# Patient Record
Sex: FEMALE | Race: WHITE | NOT HISPANIC OR LATINO | Employment: OTHER | URBAN - NONMETROPOLITAN AREA
[De-identification: names, ages, dates, MRNs, and addresses within clinical notes are randomized per-mention and may not be internally consistent; named-entity substitution may affect disease eponyms.]

---

## 2018-08-23 ENCOUNTER — TRANSCRIBE ORDERS (OUTPATIENT)
Dept: ADMINISTRATIVE | Facility: HOSPITAL | Age: 63
End: 2018-08-23

## 2018-08-23 DIAGNOSIS — I48.0 PAROXYSMAL ATRIAL FIBRILLATION (HCC): Primary | ICD-10-CM

## 2018-08-29 ENCOUNTER — HOSPITAL ENCOUNTER (OUTPATIENT)
Dept: CARDIOLOGY | Facility: HOSPITAL | Age: 63
Discharge: HOME | End: 2018-08-29
Admitting: NURSE PRACTITIONER

## 2018-08-29 DIAGNOSIS — I48.0 PAROXYSMAL ATRIAL FIBRILLATION (HCC): ICD-10-CM

## 2018-08-29 LAB
BH CV ECHO MEAS - IVSD: 1.5 CM
BH CV ECHO MEAS - LVPWD: 1.5 CM
LEFT ATRIUM VOLUME INDEX: 34 ML/M2
LV EF 2D ECHO EST: 66 %
MAXIMAL PREDICTED HEART RATE: 155 BPM
STRESS TARGET HR: 132 BPM

## 2018-08-29 PROCEDURE — 93306 TTE W/DOPPLER COMPLETE: CPT

## 2018-08-29 PROCEDURE — 93306 TTE W/DOPPLER COMPLETE: CPT | Performed by: INTERNAL MEDICINE

## 2018-08-31 ENCOUNTER — APPOINTMENT (OUTPATIENT)
Dept: CARDIOLOGY | Facility: HOSPITAL | Age: 63
End: 2018-08-31

## 2019-02-12 ENCOUNTER — CONSULT (OUTPATIENT)
Dept: CARDIOLOGY | Facility: CLINIC | Age: 64
End: 2019-02-12

## 2019-02-12 VITALS
BODY MASS INDEX: 33.19 KG/M2 | HEIGHT: 68 IN | HEART RATE: 62 BPM | DIASTOLIC BLOOD PRESSURE: 90 MMHG | SYSTOLIC BLOOD PRESSURE: 142 MMHG | WEIGHT: 219 LBS

## 2019-02-12 DIAGNOSIS — Z79.899 LONG TERM CURRENT USE OF ANTIARRHYTHMIC MEDICAL THERAPY: ICD-10-CM

## 2019-02-12 DIAGNOSIS — I48.19 PERSISTENT ATRIAL FIBRILLATION (HCC): Primary | ICD-10-CM

## 2019-02-12 DIAGNOSIS — I10 ESSENTIAL HYPERTENSION: ICD-10-CM

## 2019-02-12 PROCEDURE — 99204 OFFICE O/P NEW MOD 45 MIN: CPT | Performed by: INTERNAL MEDICINE

## 2019-02-12 PROCEDURE — 93000 ELECTROCARDIOGRAM COMPLETE: CPT | Performed by: INTERNAL MEDICINE

## 2019-02-12 RX ORDER — ALBUTEROL SULFATE 90 UG/1
2 AEROSOL, METERED RESPIRATORY (INHALATION) EVERY 4 HOURS PRN
COMMUNITY
End: 2019-03-10

## 2019-02-12 RX ORDER — BENAZEPRIL/HYDROCHLOROTHIAZIDE 20 MG-25MG
1 TABLET ORAL DAILY
COMMUNITY
End: 2019-06-06

## 2019-02-12 RX ORDER — AMLODIPINE BESYLATE 5 MG/1
10 TABLET ORAL DAILY
COMMUNITY
End: 2019-06-06 | Stop reason: ALTCHOICE

## 2019-02-12 NOTE — PROGRESS NOTES
Stefani Severino  1955  PCP: System, Provider Not In    SUBJECTIVE:   Stefani Severino is a 63 y.o. female seen for a consultation visit regarding the following:     Chief Complaint:   Chief Complaint   Patient presents with   • Atrial Fibrillation     Consult           Consultation is requested by No Known Provider for evaluation of Atrial Fibrillation (Consult )        History:  This is a 63-year-old patient with a history of atrial fibrillation treated in [facility] for many years.  She was first diagnosed with atrial fibrillation in 2009.  She underwent a cardioversion at that time.  She had a follow-up cardioversion and 2014.  She was started on Multaq in 2014 has been doing well ever since.  She is very symptomatically from her atrial fibrillation.  During the A. fib events she had experiences tachycardia palpitations along with shortness of breath.      Cardiac PMH: (Old records have been reviewed and summarized below)  1.  Atrial fibrillation-diagnosed May 2009  2.  Cardioversions-May 2009, October 2014  3.  Start on Multaq therapy in October 2014  4.  Nuclear stress test-2007-negative for ischemia  5.  Hypertension  6.  Sleep apnea-on CPAP    Past Medical History, Past Surgical History, Family history, Social History, and Medications were all reviewed with the patient today and updated as necessary.       Current Outpatient Medications:   •  albuterol sulfate  (90 Base) MCG/ACT inhaler, Inhale 2 puffs Every 4 (Four) Hours As Needed for Wheezing., Disp: , Rfl:   •  amLODIPine (NORVASC) 5 MG tablet, Take 5 mg by mouth 2 (Two) Times a Day., Disp: , Rfl:   •  aspirin 81 MG chewable tablet, Chew 81 mg Daily., Disp: , Rfl:   •  atorvastatin (LIPITOR) 20 MG tablet, Take 20 mg by mouth Daily., Disp: , Rfl:   •  azelastine (ASTELIN) 0.1 % nasal spray, 2 sprays into each nostril Daily. Use in each nostril as directed, Disp: , Rfl:   •  benazepril-hydrochlorthiazide (LOTENSIN HCT) 20-25  MG per tablet, Take 1 tablet by mouth Daily., Disp: , Rfl:   •  cetirizine (zyrTEC) 5 MG tablet, Take 5 mg by mouth Daily., Disp: , Rfl:   •  dronedarone (MULTAQ) 400 MG tablet, Take 1 tablet by mouth Daily., Disp: 60 tablet, Rfl: 11  •  escitalopram (LEXAPRO) 20 MG tablet, Take 20 mg by mouth Daily., Disp: , Rfl:   •  fluticasone-salmeterol (ADVAIR) 500-50 MCG/DOSE DISKUS, Inhale 2 (Two) Times a Day., Disp: , Rfl:     Allergies   Allergen Reactions   • Dextroamphetamine Sulfate Hallucinations   • Penicillins Unknown (See Comments)     ALLERGY AS A CHILD AND DOESN'T RECALL REACTION         Past Medical History:   Diagnosis Date   • A-fib (CMS/HCC)    • Anxiety    • Hypercholesterolemia    • Hypertension    • Sleep apnea      Past Surgical History:   Procedure Laterality Date   • APPENDECTOMY     • HYSTERECTOMY     • INNER EAR SURGERY     • TONSILLECTOMY       Family History   Problem Relation Age of Onset   • Dementia Mother    • Hydrocephalus Mother    • Ovarian cancer Mother    • Thyroid disease Mother    • Heart valve disorder Father    • Diabetes Father    • Thyroid cancer Brother    • ALS Paternal Grandmother    • Hypertension Brother    • Hypertension Brother      Social History     Tobacco Use   • Smoking status: Former Smoker     Types: Cigarettes     Last attempt to quit: 1974     Years since quittin.0   • Smokeless tobacco: Never Used   Substance Use Topics   • Alcohol use: Yes     Alcohol/week: 1.2 oz     Types: 2 Glasses of wine per week     Comment: OCCASIONALLY       ROS:    General: no recent weight loss/gain, weakness or fatigue  Skin: no rashes, lumps, or other skin changes  HEENT: no dizziness, lightheadedness, or vision changes  Respiratory: no cough or hemoptysis  Cardiovascular: No palpitations, or tachycardia  Gastrointestinal: no black/tarry stools or diarrhea  Urinary: no change in frequency or urgency  Peripheral Vascular: no claudication or leg cramps  Musculoskeletal: no muscle  "or joint pain/stiffness  Psychiatric: no depression or excessive stress  Neurological: no sensory or motor loss, no syncope  Hematologic: no anemia, easy bruising or bleeding  Endocrine: no thyroid problems, nor heat or cold intolerance       PHYSICAL EXAM:   /90 (BP Location: Left arm, Patient Position: Sitting)   Pulse 62   Ht 172.7 cm (68\")   Wt 99.3 kg (219 lb)   BMI 33.30 kg/m²      Wt Readings from Last 5 Encounters:   02/12/19 99.3 kg (219 lb)   06/13/18 94.5 kg (208 lb 4 oz)     BP Readings from Last 5 Encounters:   02/12/19 142/90   06/13/18 140/78       General-Well Nourished, Well developed  Eyes - PERRLA  Neck- supple, No mass  CV- regular rate and rhythm, no MRG  Lung- clear bilaterally  Abd- soft, +BS  Musc/skel - Norm strength and range of motion  Skin- warm and dry  Neuro - Alert & Oriented x 3, appropriate mood.    Medical problems and test results were reviewed with the patient today.     Results for orders placed or performed during the hospital encounter of 08/29/18   Adult Transthoracic Echo Complete W/ Cont if Necessary Per Protocol   Result Value Ref Range    Target HR (85%) 132 bpm    Max. Pred. HR (100%) 155 bpm    LA Volume Index 34.0 mL/m2    Echo EF Estimated 66 %    LVPWd 1.5 cm    IVSd 1.5 cm         No results found for: CHOL, HDL, HDLC, LDL, LDLC, VLDL    EKG:  (EKG/Tracing has been independently visualized by me and summarized below)      ECG 12 Lead  Date/Time: 2/12/2019 1:01 PM  Performed by: Reddy Nino MD  Authorized by: Reddy Nino MD   Rhythm: sinus rhythm  Rate: normal  QRS axis: normal    Clinical impression: normal ECG            ASSESSMENT and PLAN  1.  Atrial fibrillation-persistent in nature when it occurs.  Has required 2 cardioversions in the past.  Currently is doing well on Multaq therapy.   2.  Use of Multaq - monitor labs  3.  HTN - At goal    Return in about 6 months (around 8/12/2019).            Reddy Nino M.D., F.A.C.C, " [facility]  Cardiology/Electrophysiology  02/12/19  1:02 PM

## 2019-04-19 ENCOUNTER — LAB (OUTPATIENT)
Dept: LAB | Facility: HOSPITAL | Age: 64
End: 2019-04-19

## 2019-04-19 ENCOUNTER — OFFICE VISIT (OUTPATIENT)
Dept: FAMILY MEDICINE CLINIC | Facility: CLINIC | Age: 64
End: 2019-04-19

## 2019-04-19 VITALS
BODY MASS INDEX: 32.89 KG/M2 | WEIGHT: 217 LBS | HEIGHT: 68 IN | OXYGEN SATURATION: 96 % | HEART RATE: 68 BPM | SYSTOLIC BLOOD PRESSURE: 118 MMHG | DIASTOLIC BLOOD PRESSURE: 66 MMHG

## 2019-04-19 DIAGNOSIS — R06.02 SHORT OF BREATH ON EXERTION: ICD-10-CM

## 2019-04-19 DIAGNOSIS — R60.9 PERIPHERAL EDEMA: Primary | ICD-10-CM

## 2019-04-19 DIAGNOSIS — R60.9 PERIPHERAL EDEMA: ICD-10-CM

## 2019-04-19 LAB
ALBUMIN SERPL-MCNC: 4.1 G/DL (ref 3.5–5.2)
ALBUMIN/GLOB SERPL: 1.9 G/DL
ALP SERPL-CCNC: 42 U/L (ref 39–117)
ALT SERPL W P-5'-P-CCNC: 22 U/L (ref 1–33)
ANION GAP SERPL CALCULATED.3IONS-SCNC: 14.1 MMOL/L
AST SERPL-CCNC: 21 U/L (ref 1–32)
BILIRUB SERPL-MCNC: 0.3 MG/DL (ref 0.2–1.2)
BUN BLD-MCNC: 24 MG/DL (ref 8–23)
BUN/CREAT SERPL: 18.5 (ref 7–25)
CALCIUM SPEC-SCNC: 9.2 MG/DL (ref 8.6–10.5)
CHLORIDE SERPL-SCNC: 99 MMOL/L (ref 98–107)
CO2 SERPL-SCNC: 28.9 MMOL/L (ref 22–29)
CREAT BLD-MCNC: 1.3 MG/DL (ref 0.57–1)
GFR SERPL CREATININE-BSD FRML MDRD: 41 ML/MIN/1.73
GLOBULIN UR ELPH-MCNC: 2.2 GM/DL
GLUCOSE BLD-MCNC: 87 MG/DL (ref 65–99)
POTASSIUM BLD-SCNC: 3.8 MMOL/L (ref 3.5–5.2)
PROT SERPL-MCNC: 6.3 G/DL (ref 6–8.5)
SODIUM BLD-SCNC: 142 MMOL/L (ref 136–145)

## 2019-04-19 PROCEDURE — 36415 COLL VENOUS BLD VENIPUNCTURE: CPT

## 2019-04-19 PROCEDURE — 99204 OFFICE O/P NEW MOD 45 MIN: CPT | Performed by: FAMILY MEDICINE

## 2019-04-19 PROCEDURE — 83880 ASSAY OF NATRIURETIC PEPTIDE: CPT | Performed by: FAMILY MEDICINE

## 2019-04-19 PROCEDURE — 80053 COMPREHEN METABOLIC PANEL: CPT | Performed by: FAMILY MEDICINE

## 2019-04-19 RX ORDER — FUROSEMIDE 20 MG/1
20 TABLET ORAL AS NEEDED
COMMUNITY
End: 2019-04-19 | Stop reason: SDUPTHER

## 2019-04-19 RX ORDER — FUROSEMIDE 40 MG/1
40 TABLET ORAL DAILY
Qty: 30 TABLET | Refills: 0 | Status: SHIPPED | OUTPATIENT
Start: 2019-04-19 | End: 2019-04-19 | Stop reason: SDUPTHER

## 2019-04-19 RX ORDER — FUROSEMIDE 40 MG/1
40 TABLET ORAL DAILY
Qty: 90 TABLET | Refills: 0 | Status: SHIPPED | OUTPATIENT
Start: 2019-04-19 | End: 2019-06-06 | Stop reason: ALTCHOICE

## 2019-04-19 NOTE — PROGRESS NOTES
Stefani Severino presents today to establish care.    Chief Complaint   Patient presents with   • Foot Swelling     bilateral, left more than right, started 2 weeks ago and is getting worse        HPI     Edema:  Onset 2 weeks ago. Ankle down. Worse later in the day. No known aggravating factors. Feels tight. Sometimes legs get red and itching with swelling, but no bumps. Last PCP sent lasix #5 pills. Took lasix pill around 8am without improvement. Tried to reduce salt in diet. No prior h/o swelling. Feels tired. Sinus infection in March and hasn't gotten energy back. Notices going up 2-3 flights of steps more short of breath. No orthopnea.  Patient reports that she was in a tanning bed yesterday.        Review of Systems   Constitutional: Positive for fatigue.   HENT: Positive for hearing loss.    Eyes: Negative.    Respiratory: Positive for shortness of breath.    Cardiovascular: Positive for leg swelling. Negative for chest pain and palpitations.        Leg cramps   Gastrointestinal: Negative.    Endocrine: Negative.    Genitourinary: Negative.    Musculoskeletal: Negative.    Skin: Positive for rash.   Neurological: Positive for weakness.   Hematological: Negative.    Psychiatric/Behavioral: Negative.       PHQ-2/PHQ-9 Depression Screening 4/19/2019   Little interest or pleasure in doing things 0   Feeling down, depressed, or hopeless 0   Total Score 0       Past Medical History:   Diagnosis Date   • A-fib (CMS/HCC)    • Anxiety    • Diastolic dysfunction     grade 1 with impaired relaxation   • Hearing loss    • Hypercholesterolemia    • Hypertension    • Left atrial dilatation    • LVH (left ventricular hypertrophy)    • Sleep apnea    • Tinnitus         Past Surgical History:   Procedure Laterality Date   • APPENDECTOMY  1969   • HYSTERECTOMY  1984   • INNER EAR SURGERY Left 1997, 2005   • TONSILLECTOMY  1959        Family History   Problem Relation Age of Onset   • Dementia Mother    • Hydrocephalus  Mother    • Ovarian cancer Mother 47   • Arthritis Mother    • Osteoporosis Mother    • Hyperthyroidism Mother    • Heart valve disorder Father    • Diabetes Father    • Thyroid cancer Brother    • ALS Paternal Grandmother    • Hypertension Brother    • Hypertension Brother         Social History     Socioeconomic History   • Marital status: Single     Spouse name: Not on file   • Number of children: Not on file   • Years of education: Not on file   • Highest education level: Not on file   Tobacco Use   • Smoking status: Former Smoker     Types: Cigarettes     Last attempt to quit: 1974     Years since quittin.2   • Smokeless tobacco: Never Used   Substance and Sexual Activity   • Alcohol use: Yes     Alcohol/week: 1.2 oz     Types: 2 Glasses of wine per week     Comment: OCCASIONALLY   • Drug use: Defer   • Sexual activity: Defer        Current Outpatient Medications   Medication Sig Dispense Refill   • amLODIPine (NORVASC) 5 MG tablet Take 10 mg by mouth Daily.     • aspirin 81 MG chewable tablet Chew 81 mg Daily.     • atorvastatin (LIPITOR) 20 MG tablet Take 20 mg by mouth Daily.     • azelastine (ASTELIN) 0.1 % nasal spray 2 sprays into each nostril Daily. Use in each nostril as directed     • benazepril-hydrochlorthiazide (LOTENSIN HCT) 20-25 MG per tablet Take 1 tablet by mouth Daily.     • cetirizine (zyrTEC) 5 MG tablet Take 10 mg by mouth Daily.     • dronedarone (MULTAQ) 400 MG tablet Take 1 tablet by mouth 2 (Two) Times a Day With Meals. 180 tablet 3   • escitalopram (LEXAPRO) 20 MG tablet Take 20 mg by mouth Daily.     • fluticasone (VERAMYST) 27.5 MCG/SPRAY nasal spray 2 sprays into the nostril(s) as directed by provider Daily.     • furosemide (LASIX) 20 MG tablet Take 20 mg by mouth As Needed.       No current facility-administered medications for this visit.          Allergies   Allergen Reactions   • Dextroamphetamine Sulfate Hallucinations   • Penicillins Unknown (See Comments)      "ALLERGY AS A CHILD AND DOESN'T RECALL REACTION        Visit Vitals  /66 (BP Location: Left arm, Patient Position: Sitting, Cuff Size: Adult)   Pulse 68   Ht 172.7 cm (68\")   Wt 98.4 kg (217 lb)   SpO2 96%   BMI 32.99 kg/m²        Physical Exam   Constitutional: She is oriented to person, place, and time. No distress. She is obese.  HENT:   Nose: Nose normal.   Mouth/Throat: Oropharynx is clear and moist.   Eyes: Conjunctivae are normal. Pupils are equal, round, and reactive to light.   Neck: Neck supple. No thyromegaly present.   Cardiovascular: Normal rate, regular rhythm and intact distal pulses.   No murmur heard.  Pulses:       Posterior tibial pulses are 2+ on the right side, and 2+ on the left side.   Pulmonary/Chest: Effort normal and breath sounds normal.   Abdominal: Soft. There is no hepatosplenomegaly. There is no tenderness.   Musculoskeletal: She exhibits edema ( 1+ pitting edema to above the knee bilateral).   Lymphadenopathy:     She has no cervical adenopathy.   Neurological: She is alert and oriented to person, place, and time.   Skin: Skin is warm and dry. Rash ( 1.  Diffuse erythema to the abdomen.  2.  Bilateral lower extremities faint pinpoint erythema without papules or warmth) noted.   Psychiatric: She has a normal mood and affect.   Vitals reviewed.       Results for orders placed or performed during the hospital encounter of 18   Adult Transthoracic Echo Complete W/ Cont if Necessary Per Protocol   Result Value Ref Range    Target HR (85%) 132 bpm    Max. Pred. HR (100%) 155 bpm    LA Volume Index 34.0 mL/m2    Echo EF Estimated 66 %    LVPWd 1.5 cm    IVSd 1.5 cm      Study date: 18   Patient Information     Patient Name  Stefani Severino MRN  [id number] Sex  Female  (Age)  1955 (64 y.o.)   Sedation Narrator Report     Sedation Narrator Report   Interpretation Summary     · Left ventricular wall thickness is consistent with concentric hypertrophy.  · Left " ventricular systolic function is normal. Estimated EF = 66%.  · Left ventricular diastolic dysfunction (grade I) consistent with impaired relaxation.  · Left atrial cavity size is mildly dilated.         Stefani was seen today for foot swelling.    Diagnoses and all orders for this visit:  Prior PCP records requested.  Peripheral edema  -     Comprehensive Metabolic Panel; Future  -     furosemide (LASIX) 40 MG tablet; Take 1 tablet by mouth Daily.  New onset. Further evaluation with labs today. She has been taking Lasix 20 without benefit.  Increase Lasix to 40 mg once a day.  Reviewed echo results from August as above.  Short of breath on exertion  -     BNP; Future  -     Comprehensive Metabolic Panel; Future  New.  Further evaluation with labs today.  Normal lung exam.      Return if symptoms worsen or fail to improve.  Depending upon results of labs as above.

## 2019-04-20 ENCOUNTER — TELEPHONE (OUTPATIENT)
Dept: FAMILY MEDICINE CLINIC | Facility: CLINIC | Age: 64
End: 2019-04-20

## 2019-04-20 ENCOUNTER — HOSPITAL ENCOUNTER (EMERGENCY)
Facility: HOSPITAL | Age: 64
Discharge: HOME | End: 2019-04-20
Attending: EMERGENCY MEDICINE | Admitting: EMERGENCY MEDICINE

## 2019-04-20 ENCOUNTER — APPOINTMENT (OUTPATIENT)
Dept: GENERAL RADIOLOGY | Facility: HOSPITAL | Age: 64
End: 2019-04-20

## 2019-04-20 VITALS
TEMPERATURE: 97.7 F | RESPIRATION RATE: 16 BRPM | WEIGHT: 217 LBS | OXYGEN SATURATION: 97 % | SYSTOLIC BLOOD PRESSURE: 120 MMHG | HEART RATE: 60 BPM | BODY MASS INDEX: 32.89 KG/M2 | DIASTOLIC BLOOD PRESSURE: 60 MMHG | HEIGHT: 68 IN

## 2019-04-20 DIAGNOSIS — R60.9 DEPENDENT EDEMA: Primary | ICD-10-CM

## 2019-04-20 LAB
ALBUMIN SERPL-MCNC: 4 G/DL (ref 3.5–5)
ALBUMIN/GLOB SERPL: 1.6 G/DL (ref 1–2)
ALP SERPL-CCNC: 58 U/L (ref 38–126)
ALT SERPL W P-5'-P-CCNC: 31 U/L (ref 13–69)
ANION GAP SERPL CALCULATED.3IONS-SCNC: 12.3 MMOL/L (ref 10–20)
AST SERPL-CCNC: 25 U/L (ref 15–46)
BASOPHILS # BLD AUTO: 0.07 10*3/MM3 (ref 0–0.2)
BASOPHILS NFR BLD AUTO: 1.2 % (ref 0–1.5)
BILIRUB SERPL-MCNC: 0.3 MG/DL (ref 0.2–1.3)
BNP SERPL-MCNC: 14.2 PG/ML (ref 0–100)
BUN BLD-MCNC: 24 MG/DL (ref 7–20)
BUN/CREAT SERPL: 18.5 (ref 7.1–23.5)
CALCIUM SPEC-SCNC: 8.9 MG/DL (ref 8.4–10.2)
CHLORIDE SERPL-SCNC: 98 MMOL/L (ref 98–107)
CO2 SERPL-SCNC: 31 MMOL/L (ref 26–30)
CREAT BLD-MCNC: 1.3 MG/DL (ref 0.6–1.3)
DEPRECATED RDW RBC AUTO: 47.5 FL (ref 37–54)
EOSINOPHIL # BLD AUTO: 0.12 10*3/MM3 (ref 0–0.4)
EOSINOPHIL NFR BLD AUTO: 2 % (ref 0.3–6.2)
ERYTHROCYTE [DISTWIDTH] IN BLOOD BY AUTOMATED COUNT: 13.1 % (ref 12.3–15.4)
GFR SERPL CREATININE-BSD FRML MDRD: 41 ML/MIN/1.73
GLOBULIN UR ELPH-MCNC: 2.5 GM/DL
GLUCOSE BLD-MCNC: 101 MG/DL (ref 74–98)
HCT VFR BLD AUTO: 36.8 % (ref 34–46.6)
HGB BLD-MCNC: 12.7 G/DL (ref 12–15.9)
IMM GRANULOCYTES # BLD AUTO: 0.01 10*3/MM3 (ref 0–0.05)
IMM GRANULOCYTES NFR BLD AUTO: 0.2 % (ref 0–0.5)
LYMPHOCYTES # BLD AUTO: 1.52 10*3/MM3 (ref 0.7–3.1)
LYMPHOCYTES NFR BLD AUTO: 25.8 % (ref 19.6–45.3)
MCH RBC QN AUTO: 34 PG (ref 26.6–33)
MCHC RBC AUTO-ENTMCNC: 34.5 G/DL (ref 31.5–35.7)
MCV RBC AUTO: 98.4 FL (ref 79–97)
MONOCYTES # BLD AUTO: 0.73 10*3/MM3 (ref 0.1–0.9)
MONOCYTES NFR BLD AUTO: 12.4 % (ref 5–12)
NEUTROPHILS # BLD AUTO: 3.45 10*3/MM3 (ref 1.7–7)
NEUTROPHILS NFR BLD AUTO: 58.4 % (ref 42.7–76)
NRBC BLD AUTO-RTO: 0 /100 WBC (ref 0–0.2)
NT-PROBNP SERPL-MCNC: 38.8 PG/ML (ref 0–125)
PLATELET # BLD AUTO: 293 10*3/MM3 (ref 140–450)
PMV BLD AUTO: 9.5 FL (ref 6–12)
POTASSIUM BLD-SCNC: 3.3 MMOL/L (ref 3.5–5.1)
PROT SERPL-MCNC: 6.5 G/DL (ref 6.3–8.2)
RBC # BLD AUTO: 3.74 10*6/MM3 (ref 3.77–5.28)
SODIUM BLD-SCNC: 138 MMOL/L (ref 137–145)
TROPONIN I SERPL-MCNC: <0.012 NG/ML (ref 0–0.03)
WBC NRBC COR # BLD: 5.9 10*3/MM3 (ref 3.4–10.8)

## 2019-04-20 PROCEDURE — 83880 ASSAY OF NATRIURETIC PEPTIDE: CPT | Performed by: EMERGENCY MEDICINE

## 2019-04-20 PROCEDURE — 85025 COMPLETE CBC W/AUTO DIFF WBC: CPT | Performed by: EMERGENCY MEDICINE

## 2019-04-20 PROCEDURE — 84484 ASSAY OF TROPONIN QUANT: CPT | Performed by: EMERGENCY MEDICINE

## 2019-04-20 PROCEDURE — 99283 EMERGENCY DEPT VISIT LOW MDM: CPT

## 2019-04-20 PROCEDURE — 93005 ELECTROCARDIOGRAM TRACING: CPT | Performed by: EMERGENCY MEDICINE

## 2019-04-20 PROCEDURE — 71045 X-RAY EXAM CHEST 1 VIEW: CPT

## 2019-04-20 PROCEDURE — 80053 COMPREHEN METABOLIC PANEL: CPT | Performed by: EMERGENCY MEDICINE

## 2019-04-20 NOTE — TELEPHONE ENCOUNTER
Patient was called regarding her labs which showed evidently new renal impairment with a creatinine of 1.3. BNP had not returned as of the call. She presented to my colleague yesterday as a new patient with symptoms consistent with new-onset heart failure including pedal edema and FERNANDEZ of 2 weeks duration. Of note she has a history of persistent atrial fibrillation and left atrial dilation per chart. She reports no history of heart failure and no current palpitations, and states that her pedal edema has improved this morning after 40mg of PO Lasix.    Given the new renal impairment (likely secondary to third spacing from HF exacerbation) and dramatic change in symptoms 2 weeks ago and presumed initial heart failure exacerbation (EF preserved in on echo 8/2018), I asked her to attend to the [facility]. Report was called at 10:05 AM to alert them to her arrival, and I appreciate the assistance in her care.

## 2019-04-22 DIAGNOSIS — R79.89 ELEVATED SERUM CREATININE: Primary | ICD-10-CM

## 2019-05-23 ENCOUNTER — TELEPHONE (OUTPATIENT)
Dept: FAMILY MEDICINE CLINIC | Facility: CLINIC | Age: 64
End: 2019-05-23

## 2019-05-23 NOTE — TELEPHONE ENCOUNTER
Please call patient to remind her that she needs to have labs drawn to follow-up on her kidney function.  She does not need appointment and she can go to the lab at any time.

## 2019-05-31 ENCOUNTER — TELEPHONE (OUTPATIENT)
Dept: CARDIOLOGY | Facility: CLINIC | Age: 64
End: 2019-05-31

## 2019-06-06 ENCOUNTER — OFFICE VISIT (OUTPATIENT)
Dept: CARDIOLOGY | Facility: CLINIC | Age: 64
End: 2019-06-06

## 2019-06-06 ENCOUNTER — LAB (OUTPATIENT)
Dept: LAB | Facility: HOSPITAL | Age: 64
End: 2019-06-06

## 2019-06-06 VITALS
HEIGHT: 68 IN | WEIGHT: 217.8 LBS | HEART RATE: 60 BPM | DIASTOLIC BLOOD PRESSURE: 50 MMHG | SYSTOLIC BLOOD PRESSURE: 122 MMHG | BODY MASS INDEX: 33.01 KG/M2 | OXYGEN SATURATION: 95 %

## 2019-06-06 DIAGNOSIS — I10 ESSENTIAL HYPERTENSION: ICD-10-CM

## 2019-06-06 DIAGNOSIS — R60.0 BILATERAL LEG EDEMA: ICD-10-CM

## 2019-06-06 DIAGNOSIS — I48.19 PERSISTENT ATRIAL FIBRILLATION (HCC): Primary | ICD-10-CM

## 2019-06-06 DIAGNOSIS — Z79.899 LONG TERM CURRENT USE OF ANTIARRHYTHMIC MEDICAL THERAPY: ICD-10-CM

## 2019-06-06 DIAGNOSIS — I48.19 PERSISTENT ATRIAL FIBRILLATION (HCC): ICD-10-CM

## 2019-06-06 LAB
ANION GAP SERPL CALCULATED.3IONS-SCNC: 13.2 MMOL/L
BUN BLD-MCNC: 21 MG/DL (ref 8–23)
BUN/CREAT SERPL: 19.1 (ref 7–25)
CALCIUM SPEC-SCNC: 9.2 MG/DL (ref 8.6–10.5)
CHLORIDE SERPL-SCNC: 101 MMOL/L (ref 98–107)
CO2 SERPL-SCNC: 27.8 MMOL/L (ref 22–29)
CREAT BLD-MCNC: 1.1 MG/DL (ref 0.57–1)
GFR SERPL CREATININE-BSD FRML MDRD: 50 ML/MIN/1.73
GLUCOSE BLD-MCNC: 98 MG/DL (ref 65–99)
POTASSIUM BLD-SCNC: 3.8 MMOL/L (ref 3.5–5.2)
SODIUM BLD-SCNC: 142 MMOL/L (ref 136–145)

## 2019-06-06 PROCEDURE — 93000 ELECTROCARDIOGRAM COMPLETE: CPT | Performed by: PHYSICIAN ASSISTANT

## 2019-06-06 PROCEDURE — 99214 OFFICE O/P EST MOD 30 MIN: CPT | Performed by: PHYSICIAN ASSISTANT

## 2019-06-06 PROCEDURE — 36415 COLL VENOUS BLD VENIPUNCTURE: CPT

## 2019-06-06 PROCEDURE — 80048 BASIC METABOLIC PNL TOTAL CA: CPT

## 2019-06-06 RX ORDER — BENAZEPRIL HYDROCHLORIDE AND HYDROCHLOROTHIAZIDE 20; 12.5 MG/1; MG/1
1 TABLET ORAL DAILY
COMMUNITY
End: 2019-06-06 | Stop reason: ALTCHOICE

## 2019-06-06 RX ORDER — FUROSEMIDE 20 MG/1
20 TABLET ORAL DAILY PRN
COMMUNITY
End: 2019-06-06 | Stop reason: ALTCHOICE

## 2019-06-06 RX ORDER — FUROSEMIDE 20 MG/1
20 TABLET ORAL AS NEEDED
Qty: 90 TABLET | Refills: 3 | Status: SHIPPED | OUTPATIENT
Start: 2019-06-06 | End: 2019-10-21

## 2019-06-06 NOTE — PROGRESS NOTES
"[facility]   OFFICE NOTE      Stefani Severino  1955  PCP: Provider, No Known    SUBJECTIVE:   Stefani Severino is a 64 y.o. female seen for a follow up visit regarding the following:     CC: Edema    HPI:   Pleasant 64-year-old female presents today for a new problem of edema.  She states been going on for couple months.  She also follows Dr. Nino for history of atrial fibrillation and hypertension.  She states 2 months ago she began having worsening lower extremity edema.  She felt this was occurring more so when she was riding in the car for long periods time or sitting.  She presented her local family doctor in [place] was prescribed Lasix which did improve the symptoms.  She did not follow with a local primary care provider here in [place] where she lives.  She states that she has some lab work done suggesting her creatinine was elevated and there was concern that possibly she had \"heart failure\" therefore she was sent to the emergency room.  In the emergency room her BNP level was negative.  She has been using as needed Lasix since this time which has improved her edema.  She denies any chest pain or chest tightness suggesting angina pectoris.  She denies other symptoms of heart failure such as orthopnea or PND.  She denies palpitations suggesting recurrent atrial fibrillation.  In addition above she has decreased her BP med Lotension with hydrocal thiazide from 25 down to 12.5 she is not sure if this was a medication error from transferring her care from [place] to [place].  She would like to consider increasing this back to the 25 mg ahead with either to see if this proves edema.      ROS:  Review of Symptoms:  General: no recent weight loss/gain, weakness or fatigue  Skin: no rashes, lumps, or other skin changes  HEENT: no dizziness, lightheadedness, or vision changes  Respiratory: no cough or hemoptysis  Cardiovascular: no palpitations, and " tachycardia  Gastrointestinal: no black/tarry stools or diarrhea  Urinary: no change in frequency or urgency  Peripheral Vascular: no claudication or leg cramps  Musculoskeletal: no muscle or joint pain/stiffness  Psychiatric: no depression or excessive stress  Neurological: no sensory or motor loss, no syncope  Hematologic: no anemia, easy bruising or bleeding  Endocrine: no thyroid problems, nor heat or cold intolerance    Cardiac PMH: (Old records have been reviewed and summarized below)  1.  Atrial fibrillation-diagnosed May 2009  2.  Cardioversions-May 2009, October 2014  3.  Start on Multaq therapy in October 2014  4.  Nuclear stress test-2007-negative for ischemia  5.  Hypertension  6.  Sleep apnea-on CPAP    Past Medical History, Past Surgical History, Family history, Social History, and Medications were all reviewed with the patient today and updated as necessary.       Current Outpatient Medications:   •  aspirin 81 MG chewable tablet, Chew 81 mg Daily., Disp: , Rfl:   •  atorvastatin (LIPITOR) 20 MG tablet, Take 20 mg by mouth Daily., Disp: , Rfl:   •  azelastine (ASTELIN) 0.1 % nasal spray, 2 sprays into each nostril Daily. Use in each nostril as directed, Disp: , Rfl:   •  cetirizine (zyrTEC) 5 MG tablet, Take 10 mg by mouth Daily., Disp: , Rfl:   •  dronedarone (MULTAQ) 400 MG tablet, Take 1 tablet by mouth 2 (Two) Times a Day With Meals., Disp: 180 tablet, Rfl: 3  •  escitalopram (LEXAPRO) 20 MG tablet, Take 20 mg by mouth Daily., Disp: , Rfl:   •  fluticasone (VERAMYST) 27.5 MCG/SPRAY nasal spray, 2 sprays into the nostril(s) as directed by provider Daily., Disp: , Rfl:       Allergies   Allergen Reactions   • Dextroamphetamine Sulfate Hallucinations   • Penicillins Unknown (See Comments)     ALLERGY AS A CHILD AND DOESN'T RECALL REACTION     Patient Active Problem List   Diagnosis   • Persistent atrial fibrillation (CMS/HCC)   • Long term current use of antiarrhythmic medical therapy   • Essential  "hypertension   • Bilateral leg edema     Past Medical History:   Diagnosis Date   • A-fib (CMS/HCC)    • Anxiety    • Diastolic dysfunction     grade 1 with impaired relaxation   • Hearing loss    • Hypercholesterolemia    • Hypertension    • Left atrial dilatation    • LVH (left ventricular hypertrophy)    • Sleep apnea    • Tinnitus      Past Surgical History:   Procedure Laterality Date   • APPENDECTOMY     • HYSTERECTOMY     • INNER EAR SURGERY Left ,    • TONSILLECTOMY       Family History   Problem Relation Age of Onset   • Dementia Mother    • Hydrocephalus Mother    • Ovarian cancer Mother 47   • Arthritis Mother    • Osteoporosis Mother    • Hyperthyroidism Mother    • Heart valve disorder Father    • Diabetes Father    • Thyroid cancer Brother    • Hypertension Brother    • Hypertension Brother      Social History     Tobacco Use   • Smoking status: Former Smoker     Packs/day: 1.00     Types: Cigarettes     Last attempt to quit: 1974     Years since quittin.3   • Smokeless tobacco: Never Used   Substance Use Topics   • Alcohol use: Yes     Alcohol/week: 0.6 oz     Types: 1 Glasses of wine per week     Comment: OCCASIONALLY         PHYSICAL EXAM:    /50 (BP Location: Left arm, Patient Position: Sitting)   Pulse 60   Ht 172.7 cm (68\")   Wt 98.8 kg (217 lb 12.8 oz)   SpO2 95%   BMI 33.12 kg/m²        Wt Readings from Last 5 Encounters:   19 98.8 kg (217 lb 12.8 oz)   19 98.4 kg (217 lb)   19 98.4 kg (217 lb)   03/10/19 98.4 kg (217 lb)   19 99.3 kg (219 lb)       BP Readings from Last 5 Encounters:   19 122/50   19 120/60   19 118/66   03/10/19 122/74   19 142/90       General appearance - Alert, well appearing, and in no distress   Mental status - Affect appropriate to mood.  Eyes - Sclerae anicteric,  ENMT - Hearing grossly normal bilaterally, Dental hygiene good.  Neck - Carotids upstroke normal bilaterally, no " bruits, no JVD.  Resp - Clear to auscultation, no wheezes, rales or rhonchi, symmetric air entry.  Heart - Normal rate, regular rhythm, normal S1, S2, no murmurs, rubs, clicks or gallops.  GI - Soft, nontender, nondistended, no masses or organomegaly.  Neurological - Grossly intact - normal speech, no focal findings  Musculoskeletal - No joint tenderness, deformity or swelling, no muscular tenderness noted.  Extremities - Peripheral pulses normal,+ edema, no clubbing or cyanosis.  Skin - Normal coloration and turgor.  Psych -  oriented to person, place, and time.    Medical problems and test results were reviewed with the patient today.     Echo 8/18     · Left ventricular wall thickness is consistent with concentric hypertrophy.  · Left ventricular systolic function is normal. Estimated EF = 66%.  · Left ventricular diastolic dysfunction (grade I) consistent with impaired relaxation.  · Left atrial cavity size is mildly dilated.          ECG 12 Lead  Date/Time: 6/18/2019 4:15 PM  Performed by: Kulwant Krause PA  Authorized by: Kulwant Krause PA   Comparison: compared with previous ECG from 4/20/2019  Similar to previous ECG  Rhythm: sinus rhythm  Rate: normal  Conduction: conduction normal  ST Segments: ST segments normal  T Waves: T waves normal  QRS axis: normal    Clinical impression: normal ECG            ASSESSMENT   1. LE Edema: Previous Echo reveals Normal EF.  Her symptoms of edema suggest venous insufficiency as they improve in the mornings.  We have recommended that she reduce sodium increase diet exercise weight loss and consider wearing SALVATORE hose.  In addition we will change her blood pressure medication lotensin/HCTZ to 20/25 mg daily.  She can continue to use Lasix on an as-needed basis.  Today will obtain a BMP level as she has not had this rechecked since taking Lasix.  2. PAF: No recurrent events of atrial fibrillation on the Multitaq therapy with a stable EKG.  3. HTN: Controlled on  current medications.  Will increase Lotensin/Hydrocort thiazide 20/25 and she will stop her Norvasc which may be contributing to her lower extremity edema.  4. Anticoagulation:  Chadsvasc=2, Continue ASA daily. Declines anticoagulation.     PLAN  · Change blood pressure medication increase HCTZ dose to 25 daily.  Reduce sodium.  Wear SALVATORE hose.  Use Lasix as needed basis.  · BMP today  · Discontinue Norvasc  · Return for follow-up Dr. Nino as scheduled.    6/6/2019  3:35 PM    Will Nelida NI

## 2019-08-19 ENCOUNTER — TELEPHONE (OUTPATIENT)
Dept: CARDIOLOGY | Facility: CLINIC | Age: 64
End: 2019-08-19

## 2019-08-19 NOTE — TELEPHONE ENCOUNTER
Patient called to say in the last month her BP has been rising. However, she does not check it regularly and just remembers at the doctor recently it was 140s systolic. I requested that she begin to keep a log and call back to report if it is higher

## 2019-08-26 NOTE — TELEPHONE ENCOUNTER
Patient called back to give you her BP readings: 150/97, 130/92, 130/85, 136/87, 137/74, 131/82, 135/88, 154/96, 125/85, 140/83, 135/82. You stopped Norvasc on 6/6/19 due to lower extremity edema. She was just wondering is she needed to start a different medication?

## 2019-08-27 ENCOUNTER — TELEPHONE (OUTPATIENT)
Dept: CARDIOLOGY | Facility: CLINIC | Age: 64
End: 2019-08-27

## 2019-08-27 NOTE — TELEPHONE ENCOUNTER
----- Message from HARRISON Schultz sent at 8/27/2019  9:06 AM EDT -----  I'm ok with 130 systolic.  Is Edema better?              I tried to call the patient. No answer. I left a message.

## 2019-09-17 ENCOUNTER — OFFICE VISIT (OUTPATIENT)
Dept: CARDIOLOGY | Facility: CLINIC | Age: 64
End: 2019-09-17

## 2019-09-17 VITALS
DIASTOLIC BLOOD PRESSURE: 88 MMHG | HEIGHT: 68 IN | WEIGHT: 220.6 LBS | HEART RATE: 68 BPM | SYSTOLIC BLOOD PRESSURE: 144 MMHG | BODY MASS INDEX: 33.43 KG/M2

## 2019-09-17 DIAGNOSIS — Z79.899 LONG TERM CURRENT USE OF ANTIARRHYTHMIC MEDICAL THERAPY: ICD-10-CM

## 2019-09-17 DIAGNOSIS — I48.19 PERSISTENT ATRIAL FIBRILLATION (HCC): Primary | ICD-10-CM

## 2019-09-17 PROCEDURE — 93000 ELECTROCARDIOGRAM COMPLETE: CPT | Performed by: PHYSICIAN ASSISTANT

## 2019-09-17 PROCEDURE — 99214 OFFICE O/P EST MOD 30 MIN: CPT | Performed by: PHYSICIAN ASSISTANT

## 2019-09-17 RX ORDER — AMLODIPINE BESYLATE 2.5 MG/1
2.5 TABLET ORAL DAILY
Refills: 1 | COMMUNITY
Start: 2019-09-06 | End: 2019-10-21 | Stop reason: SDUPTHER

## 2019-09-17 NOTE — PROGRESS NOTES
Stefani Severino  1955  PCP: Provider, No Known    SUBJECTIVE:   Stefani Severino is a 64 y.o. female seen for a follow up visit regarding the following:     Chief Complaint: Follow up for afib     HPI:    Since last visit the patient's status has been stable. She has not had any afib. No recurrent issues with edema. Restarted Norvasc and BP has been better. No CP, SOB, edema.     History:  This is a 63-year-old patient with a history of atrial fibrillation treated in [facility] for many years.  She was first diagnosed with atrial fibrillation in 2009.  She underwent a cardioversion at that time.  She had a follow-up cardioversion and 2014.  She was started on Multaq in 2014 has been doing well ever since.  She is very symptomatically from her atrial fibrillation.  During the A. fib events she had experiences tachycardia palpitations along with shortness of breath.        Cardiac PMH: (Old records have been reviewed and summarized below)  1.  Atrial fibrillation-diagnosed May 2009  2.  Cardioversions-May 2009, October 2014  3.  Start on Multaq therapy in October 2014  4.  Nuclear stress test-2007-negative for ischemia  5.  Hypertension  6.  Sleep apnea-on CPAP      Current Outpatient Medications:   •  amLODIPine (NORVASC) 2.5 MG tablet, Take 2.5 mg by mouth Daily., Disp: , Rfl: 1  •  aspirin 81 MG chewable tablet, Chew 81 mg Daily., Disp: , Rfl:   •  atorvastatin (LIPITOR) 20 MG tablet, Take 20 mg by mouth Daily., Disp: , Rfl:   •  azelastine (ASTELIN) 0.1 % nasal spray, 2 sprays into each nostril Daily. Use in each nostril as directed, Disp: , Rfl:   •  benazepril 20 MG tablet 20 mg, hydrochlorothiazide 25 MG tablet 25 mg, Take 1 tablet by mouth Daily., Disp: , Rfl:   •  cetirizine (zyrTEC) 5 MG tablet, Take 10 mg by mouth Daily., Disp: , Rfl:   •  dronedarone (MULTAQ) 400 MG tablet, Take 1 tablet by mouth 2 (Two) Times a Day With Meals., Disp: 180 tablet, Rfl: 3  •  escitalopram  (LEXAPRO) 20 MG tablet, Take 20 mg by mouth Daily., Disp: , Rfl:   •  fluticasone (VERAMYST) 27.5 MCG/SPRAY nasal spray, 2 sprays into the nostril(s) as directed by provider Daily., Disp: , Rfl:   •  furosemide (LASIX) 20 MG tablet, Take 1 tablet by mouth As Needed (edema, for three days)., Disp: 90 tablet, Rfl: 3    Past Medical History, Past Surgical History, Family history, Social History, and Medications were all reviewed with the patient today and updated as necessary.       Patient Active Problem List   Diagnosis   • Persistent atrial fibrillation (CMS/HCC)   • Long term current use of antiarrhythmic medical therapy   • Essential hypertension   • Bilateral leg edema     Allergies   Allergen Reactions   • Dextroamphetamine Sulfate Hallucinations   • Penicillins Unknown (See Comments)     ALLERGY AS A CHILD AND DOESN'T RECALL REACTION     Past Medical History:   Diagnosis Date   • A-fib (CMS/HCC)    • Anxiety    • Diastolic dysfunction     grade 1 with impaired relaxation   • Hearing loss    • Hypercholesterolemia    • Hypertension    • Left atrial dilatation    • LVH (left ventricular hypertrophy)    • Sleep apnea    • Tinnitus      Past Surgical History:   Procedure Laterality Date   • APPENDECTOMY     • HYSTERECTOMY     • INNER EAR SURGERY Left ,    • TONSILLECTOMY       Family History   Problem Relation Age of Onset   • Dementia Mother    • Hydrocephalus Mother    • Ovarian cancer Mother 47   • Arthritis Mother    • Osteoporosis Mother    • Hyperthyroidism Mother    • Heart valve disorder Father    • Diabetes Father    • Thyroid cancer Brother    • Hypertension Brother    • Hypertension Brother      Social History     Tobacco Use   • Smoking status: Former Smoker     Packs/day: 1.00     Types: Cigarettes     Last attempt to quit: 1974     Years since quittin.6   • Smokeless tobacco: Never Used   Substance Use Topics   • Alcohol use: Yes     Alcohol/week: 0.6 oz     Types: 1  "Glasses of wine per week     Comment: OCCASIONALLY         PHYSICAL EXAM:    /88 (BP Location: Left arm, Patient Position: Sitting)   Pulse 68   Ht 172.7 cm (68\")   Wt 100 kg (220 lb 9.6 oz)   BMI 33.54 kg/m²        Wt Readings from Last 5 Encounters:   09/17/19 100 kg (220 lb 9.6 oz)   06/06/19 98.8 kg (217 lb 12.8 oz)   04/20/19 98.4 kg (217 lb)   04/19/19 98.4 kg (217 lb)   03/10/19 98.4 kg (217 lb)       BP Readings from Last 5 Encounters:   09/17/19 144/88   06/06/19 122/50   04/20/19 120/60   04/19/19 118/66   03/10/19 122/74       General-Well Nourished, Well developed  Eyes - PERRLA  Neck- supple, No mass  CV- regular rate and rhythm, no MRG, No edema  Lung- clear bilaterally  Abd- soft, +BS  Musc/skel - Norm strength and range of motion  Skin- warm and dry  Neuro - Alert & Oriented x 3, appropriate mood.        Medical problems and test results were reviewed with the patient today.     No results found for this or any previous visit (from the past 672 hour(s)).      EKG: (EKG has been independently visualized by me and summarized below)      ECG 12 Lead  Date/Time: 9/17/2019 4:18 PM  Performed by: Cristy Cadena PA  Authorized by: Cristy Cadena PA   Comparison: compared with previous ECG from 6/18/2019  Similar to previous ECG  Rhythm: sinus rhythm  Rate: normal  BPM: 68  QRS axis: normal    Clinical impression: abnormal EKG             ASSESSMENT and PLAN    1.  Atrial fibrillation-persistent in nature when it occurs.  Has required 2 cardioversions in the past.  Currently is doing well on Multaq therapy.   2.  Use of Multaq - monitor labs. EKG reviewed and stable.   3.  HTN - elevated today, but patient under stress. Instructed to monitor at home. Continue current regimen.       Return in about 6 months (around 3/17/2020).        RISHI StewardC   Cardiology/Electrophysiology  9/17/2019  4:18 PM  "

## 2019-10-21 ENCOUNTER — OFFICE VISIT (OUTPATIENT)
Dept: INTERNAL MEDICINE | Facility: CLINIC | Age: 64
End: 2019-10-21

## 2019-10-21 VITALS
TEMPERATURE: 98.3 F | DIASTOLIC BLOOD PRESSURE: 82 MMHG | OXYGEN SATURATION: 99 % | HEIGHT: 68 IN | BODY MASS INDEX: 33.04 KG/M2 | WEIGHT: 218 LBS | SYSTOLIC BLOOD PRESSURE: 150 MMHG | HEART RATE: 87 BPM

## 2019-10-21 DIAGNOSIS — I48.19 PERSISTENT ATRIAL FIBRILLATION (HCC): ICD-10-CM

## 2019-10-21 DIAGNOSIS — R60.0 BILATERAL LEG EDEMA: ICD-10-CM

## 2019-10-21 DIAGNOSIS — Z76.89 ENCOUNTER TO ESTABLISH CARE: Primary | ICD-10-CM

## 2019-10-21 DIAGNOSIS — F41.1 GENERALIZED ANXIETY DISORDER: ICD-10-CM

## 2019-10-21 DIAGNOSIS — I10 ESSENTIAL HYPERTENSION: ICD-10-CM

## 2019-10-21 DIAGNOSIS — Z13.820 SCREENING FOR OSTEOPOROSIS: ICD-10-CM

## 2019-10-21 DIAGNOSIS — Z78.0 POSTMENOPAUSAL: ICD-10-CM

## 2019-10-21 PROBLEM — G47.33 OSA ON CPAP: Status: ACTIVE | Noted: 2019-10-21

## 2019-10-21 PROBLEM — Z99.89 OSA ON CPAP: Status: ACTIVE | Noted: 2019-10-21

## 2019-10-21 PROCEDURE — 99214 OFFICE O/P EST MOD 30 MIN: CPT | Performed by: PHYSICIAN ASSISTANT

## 2019-10-21 RX ORDER — CETIRIZINE HYDROCHLORIDE 10 MG/1
10 TABLET ORAL DAILY
Refills: 1 | COMMUNITY
Start: 2019-10-07 | End: 2020-04-02

## 2019-10-21 RX ORDER — AMLODIPINE BESYLATE 5 MG/1
5 TABLET ORAL DAILY
Qty: 90 TABLET | Refills: 1 | Status: SHIPPED | OUTPATIENT
Start: 2019-10-21 | End: 2020-04-02 | Stop reason: SDUPTHER

## 2019-10-21 RX ORDER — INFLUENZA A VIRUS A/MICHIGAN/45/2015 X-275 (H1N1) ANTIGEN (FORMALDEHYDE INACTIVATED), INFLUENZA A VIRUS A/SINGAPORE/INFIMH-16-0019/2016 IVR-186 (H3N2) ANTIGEN (FORMALDEHYDE INACTIVATED), AND INFLUENZA B VIRUS B/MARYLAND/15/2016 BX-69A (A B/COLORADO/6/2017-LIKE VIRUS) ANTIGEN (FORMALDEHYDE INACTIVATED) 60; 60; 60 UG/.5ML; UG/.5ML; UG/.5ML
INJECTION, SUSPENSION INTRAMUSCULAR
Refills: 0 | COMMUNITY
Start: 2019-09-16 | End: 2020-05-22

## 2019-10-21 NOTE — PROGRESS NOTES
Subjective   Stefani Severino is a 64 y.o. female who presents to [facility].  Moved here from [place] around 18 months.     Chief Complaint   Patient presents with   • [facility]     needs dexa scan       HPI: Atrial fibrillation- diagnosed in 2009. She is established with a cardiologist.  She does experience occasional palpitations which she has discussed with cardiology.  Denies any chest pain or SOA.  No apparent rate control or anticoagulation other than aspirin 81 mg daily.  She is taking Multaq daily as directed by cardiology.    HTN- amlodipine was d/c in June due to acute lower extremity edema (which had already resolved) and benazepril-HCTZ dose was increased. Since then her former PCP restarted amlodipine at 2.5 mg but BP is still not well controlled.  She denies any lower extremity edema on a daily basis.  Patient denies chest pain, dyspnea, orthopnea, headaches, weakness, visual disturbances or confusion.     She is unsure when her last bone density test was. No personal history of low bone mass but her mother had osteoporosis.           The following portions of the patient's history were reviewed and updated as appropriate: She  has a past medical history of A-fib (CMS/HCC), Anxiety, Diastolic dysfunction, Hearing loss, Hypercholesterolemia, Hypertension, Left atrial dilatation, LVH (left ventricular hypertrophy), Sleep apnea, and Tinnitus.  She does not have any pertinent problems on file.  She  has a past surgical history that includes Inner ear surgery (Left, 1997, 2005); Tonsillectomy (1959); Hysterectomy (1984); and Appendectomy (1969).  Her family history includes Arthritis in her mother; Dementia in her mother; Diabetes in her father; Heart valve disorder in her father; Hydrocephalus in her mother; Hypertension in her brother and brother; Hyperthyroidism in her mother; Osteoporosis in her mother; Ovarian cancer (age of onset: 47) in her mother; Thyroid cancer in her brother.  She   reports that she quit smoking about 45 years ago. Her smoking use included cigarettes. She has a 2.00 pack-year smoking history. She has never used smokeless tobacco. She reports that she drinks about 0.6 oz of alcohol per week. Drug use questions deferred to the physician.    Current Outpatient Medications   Medication Sig Dispense Refill   • amLODIPine (NORVASC) 5 MG tablet Take 1 tablet by mouth Daily. 90 tablet 1   • aspirin 81 MG chewable tablet Chew 81 mg Daily.     • atorvastatin (LIPITOR) 20 MG tablet Take 20 mg by mouth Daily.     • azelastine (ASTELIN) 0.1 % nasal spray 2 sprays into each nostril Daily. Use in each nostril as directed     • benazepril 20 MG tablet 20 mg, hydrochlorothiazide 25 MG tablet 25 mg Take 1 tablet by mouth Daily.     • dronedarone (MULTAQ) 400 MG tablet Take 1 tablet by mouth 2 (Two) Times a Day With Meals. 180 tablet 3   • escitalopram (LEXAPRO) 20 MG tablet Take 20 mg by mouth Daily.     • fluticasone (VERAMYST) 27.5 MCG/SPRAY nasal spray 2 sprays into the nostril(s) as directed by provider Daily.     • cetirizine (zyrTEC) 10 MG tablet Take 10 mg by mouth Daily.  1   • FLUZONE HIGH-DOSE 0.5 ML suspension prefilled syringe injection ADM 0.5ML IM UTD  0     No current facility-administered medications for this visit.        Review of Systems  Review of Systems   Constitutional: Positive for unexpected weight change. Negative for appetite change, chills, fatigue and fever.   HENT: Negative for congestion, ear pain, hearing loss, nosebleeds, postnasal drip, rhinorrhea, sore throat, tinnitus and trouble swallowing.    Eyes: Negative for photophobia, discharge and visual disturbance.   Respiratory: Negative for cough, chest tightness, shortness of breath and wheezing.    Cardiovascular: Positive for palpitations (rare) and leg swelling (rare). Negative for chest pain.   Gastrointestinal: Negative for abdominal distention, abdominal pain, blood in stool, constipation, diarrhea, nausea  "and vomiting.   Endocrine: Negative for cold intolerance, heat intolerance, polydipsia, polyphagia and polyuria.   Genitourinary: Negative.    Musculoskeletal: Negative for arthralgias, back pain, joint swelling, myalgias, neck pain and neck stiffness.   Skin: Negative for color change, pallor, rash and wound.   Allergic/Immunologic: Negative for environmental allergies, food allergies and immunocompromised state.   Neurological: Negative for dizziness, tremors, seizures, weakness, numbness and headaches.   Hematological: Negative for adenopathy. Does not bruise/bleed easily.   Psychiatric/Behavioral: Negative for agitation, behavioral problems, confusion, hallucinations, self-injury and suicidal ideas. The patient is not nervous/anxious.          Objective    /82   Pulse 87   Temp 98.3 °F (36.8 °C)   Ht 172.7 cm (67.99\")   Wt 98.9 kg (218 lb)   SpO2 99%   BMI 33.15 kg/m²   Physical Exam   Constitutional: She is oriented to person, place, and time. She appears well-developed and well-nourished. No distress.   Obese.    HENT:   Head: Normocephalic and atraumatic.   Mouth/Throat: Oropharynx is clear and moist.   Bilateral hearing aids in place.    Eyes: Conjunctivae and EOM are normal. Pupils are equal, round, and reactive to light.   Neck: Normal range of motion. Neck supple.   Cardiovascular: Normal rate, regular rhythm and normal heart sounds. Exam reveals no gallop and no friction rub.   No murmur heard.  Pulmonary/Chest: Effort normal and breath sounds normal. No stridor. No respiratory distress. She has no wheezes. She has no rales. She exhibits no tenderness.   Abdominal: Soft. Bowel sounds are normal. She exhibits no mass. There is no tenderness. No hernia.   Musculoskeletal: Normal range of motion. She exhibits no edema, tenderness or deformity.   Lymphadenopathy:     She has no cervical adenopathy.   Neurological: She is alert and oriented to person, place, and time. She displays normal reflexes. " No cranial nerve deficit or sensory deficit. She exhibits normal muscle tone. Coordination normal.   Skin: Skin is warm and dry. Capillary refill takes less than 2 seconds. No rash noted. She is not diaphoretic.   Psychiatric: She has a normal mood and affect. Her behavior is normal. Judgment and thought content normal.   Nursing note and vitals reviewed.        Assessment/Plan      Diagnosis Plan   1. Encounter to establish care     2. Screening for osteoporosis  DEXA Bone Density Axial     3. Postmenopausal  DEXA Bone Density Axial     4. Persistent atrial fibrillation  Managed by cardiology.  Continue Multaq and aspirin 81 mg daily.    Auscultation consistent with sinus rhythm in office today.     5. Essential hypertension  Dose increase: amLODIPine (NORVASC) 5 MG tablet  Continue benazepril-hydrochlorothiazide daily.    Follow heart healthy/low salt diet.  Avoid processed foods.  Monitor blood pressure as discussed.  Exercise as tolerated up to 30 minutes 5 days per week.  Take medications as prescribed.     6. Bilateral leg edema  Acute episode several months ago without any daily notable lower extremity edema.  Amlodipine unlikely to be the cause of the episode of edema given associated travel at the time so we will rechallenge by increasing amlodipine dose to 5 mg daily and attempt to better HTN control.     7. Generalized anxiety disorder  Well-controlled with Lexapro 20 mg daily, continue.       Records requested from previous PCP including labs, immunizations, hemogram report, colonoscopy report, bone density report and consultation notes.  She reports most recent thorough lab evaluation was 4 to 6 weeks ago.      Return in about 6 months (around 4/21/2020) for medicare wellness.             HARRISON Martinez  10/21/2019  11:11 AM

## 2019-10-25 ENCOUNTER — APPOINTMENT (OUTPATIENT)
Dept: BONE DENSITY | Facility: HOSPITAL | Age: 64
End: 2019-10-25

## 2019-10-25 PROCEDURE — 77080 DXA BONE DENSITY AXIAL: CPT

## 2019-11-14 PROBLEM — M19.011 ARTHRITIS OF RIGHT SHOULDER REGION: Status: ACTIVE | Noted: 2019-11-14

## 2019-11-14 PROBLEM — R73.03 PREDIABETES: Status: ACTIVE | Noted: 2019-11-14

## 2019-11-14 RX ORDER — TETANUS TOXOID, REDUCED DIPHTHERIA TOXOID AND ACELLULAR PERTUSSIS VACCINE, ADSORBED 5; 2.5; 8; 8; 2.5 [IU]/.5ML; [IU]/.5ML; UG/.5ML; UG/.5ML; UG/.5ML
SUSPENSION INTRAMUSCULAR
Refills: 0 | COMMUNITY
Start: 2019-10-21 | End: 2020-05-22

## 2020-01-16 ENCOUNTER — TRANSCRIBE ORDERS (OUTPATIENT)
Dept: LAB | Facility: HOSPITAL | Age: 65
End: 2020-01-16

## 2020-01-16 ENCOUNTER — APPOINTMENT (OUTPATIENT)
Dept: LAB | Facility: HOSPITAL | Age: 65
End: 2020-01-16

## 2020-01-16 DIAGNOSIS — Z01.812 PRE-OPERATIVE LABORATORY EXAMINATION: Primary | ICD-10-CM

## 2020-01-16 DIAGNOSIS — I10 ESSENTIAL HYPERTENSION, MALIGNANT: ICD-10-CM

## 2020-01-16 LAB
ALBUMIN SERPL-MCNC: 4.1 G/DL (ref 3.5–5.2)
ALBUMIN/GLOB SERPL: 1.7 G/DL
ALP SERPL-CCNC: 56 U/L (ref 39–117)
ALT SERPL W P-5'-P-CCNC: 18 U/L (ref 1–33)
ANION GAP SERPL CALCULATED.3IONS-SCNC: 11.6 MMOL/L (ref 5–15)
AST SERPL-CCNC: 16 U/L (ref 1–32)
BILIRUB SERPL-MCNC: 0.2 MG/DL (ref 0.2–1.2)
BUN BLD-MCNC: 18 MG/DL (ref 8–23)
BUN/CREAT SERPL: 14.1 (ref 7–25)
CALCIUM SPEC-SCNC: 8.8 MG/DL (ref 8.6–10.5)
CHLORIDE SERPL-SCNC: 101 MMOL/L (ref 98–107)
CO2 SERPL-SCNC: 28.4 MMOL/L (ref 22–29)
CREAT BLD-MCNC: 1.28 MG/DL (ref 0.57–1)
DEPRECATED RDW RBC AUTO: 43.5 FL (ref 37–54)
ERYTHROCYTE [DISTWIDTH] IN BLOOD BY AUTOMATED COUNT: 12.6 % (ref 12.3–15.4)
GFR SERPL CREATININE-BSD FRML MDRD: 42 ML/MIN/1.73
GLOBULIN UR ELPH-MCNC: 2.4 GM/DL
GLUCOSE BLD-MCNC: 89 MG/DL (ref 65–99)
HCT VFR BLD AUTO: 33.3 % (ref 34–46.6)
HGB BLD-MCNC: 11.6 G/DL (ref 12–15.9)
MCH RBC QN AUTO: 33.3 PG (ref 26.6–33)
MCHC RBC AUTO-ENTMCNC: 34.8 G/DL (ref 31.5–35.7)
MCV RBC AUTO: 95.7 FL (ref 79–97)
PLATELET # BLD AUTO: 279 10*3/MM3 (ref 140–450)
PMV BLD AUTO: 10.2 FL (ref 6–12)
POTASSIUM BLD-SCNC: 4.5 MMOL/L (ref 3.5–5.2)
PROT SERPL-MCNC: 6.5 G/DL (ref 6–8.5)
RBC # BLD AUTO: 3.48 10*6/MM3 (ref 3.77–5.28)
SODIUM BLD-SCNC: 141 MMOL/L (ref 136–145)
WBC NRBC COR # BLD: 5.92 10*3/MM3 (ref 3.4–10.8)

## 2020-01-16 PROCEDURE — 85027 COMPLETE CBC AUTOMATED: CPT | Performed by: OTOLARYNGOLOGY

## 2020-01-16 PROCEDURE — 36415 COLL VENOUS BLD VENIPUNCTURE: CPT | Performed by: OTOLARYNGOLOGY

## 2020-01-16 PROCEDURE — 80053 COMPREHEN METABOLIC PANEL: CPT | Performed by: OTOLARYNGOLOGY

## 2020-02-13 ENCOUNTER — TELEPHONE (OUTPATIENT)
Dept: CARDIOLOGY | Facility: CLINIC | Age: 65
End: 2020-02-13

## 2020-03-09 ENCOUNTER — TELEPHONE (OUTPATIENT)
Dept: INTERNAL MEDICINE | Facility: CLINIC | Age: 65
End: 2020-03-09

## 2020-03-09 RX ORDER — ATORVASTATIN CALCIUM 20 MG/1
20 TABLET, FILM COATED ORAL DAILY
Qty: 30 TABLET | Refills: 5 | Status: SHIPPED | OUTPATIENT
Start: 2020-03-09 | End: 2020-05-22 | Stop reason: SDUPTHER

## 2020-03-09 RX ORDER — ESCITALOPRAM OXALATE 20 MG/1
20 TABLET ORAL DAILY
Qty: 30 TABLET | Refills: 5 | Status: SHIPPED | OUTPATIENT
Start: 2020-03-09 | End: 2020-11-16

## 2020-03-09 NOTE — TELEPHONE ENCOUNTER
PT CALLED TO REQUEST A REFILL FOR Escitalopram 20 MG AND ATORVASTIAIN 20 MG.      PHARMACY VERIFIED AT [facility] IN [place].

## 2020-03-18 ENCOUNTER — PATIENT MESSAGE (OUTPATIENT)
Dept: CARDIOLOGY | Facility: CLINIC | Age: 65
End: 2020-03-18

## 2020-04-02 DIAGNOSIS — I10 ESSENTIAL HYPERTENSION: ICD-10-CM

## 2020-04-02 RX ORDER — CETIRIZINE HYDROCHLORIDE 10 MG/1
TABLET ORAL
Qty: 60 TABLET | Refills: 0 | Status: SHIPPED | OUTPATIENT
Start: 2020-04-02 | End: 2020-06-29

## 2020-04-02 RX ORDER — AMLODIPINE BESYLATE 5 MG/1
10 TABLET ORAL DAILY
Qty: 180 TABLET | Refills: 3 | Status: SHIPPED | OUTPATIENT
Start: 2020-04-02 | End: 2020-05-22 | Stop reason: SDUPTHER

## 2020-04-21 ENCOUNTER — TELEMEDICINE (OUTPATIENT)
Dept: SLEEP MEDICINE | Facility: HOSPITAL | Age: 65
End: 2020-04-21

## 2020-04-21 VITALS — BODY MASS INDEX: 33.04 KG/M2 | HEIGHT: 68 IN | WEIGHT: 218 LBS

## 2020-04-21 DIAGNOSIS — Z99.89 OSA ON CPAP: Primary | ICD-10-CM

## 2020-04-21 DIAGNOSIS — G47.33 OSA ON CPAP: Primary | ICD-10-CM

## 2020-04-21 DIAGNOSIS — E66.9 OBESITY (BMI 30-39.9): ICD-10-CM

## 2020-04-21 PROBLEM — G43.909 MIGRAINE: Status: ACTIVE | Noted: 2020-02-25

## 2020-04-21 PROCEDURE — 99203 OFFICE O/P NEW LOW 30 MIN: CPT | Performed by: INTERNAL MEDICINE

## 2020-04-21 NOTE — PROGRESS NOTES
INITIAL VIDEO SLEEP VISIT    Stefani Severino is a 65 y.o. female.   Chief Complaint   Patient presents with   • Sleep Apnea       Patient has consented to a video visit via [employer]    HPI     65 y.o. female seen in consultation at the request of No ref. provider found for evaluation of the above.     She has a longstanding history of obstructive sleep apnea.  This was diagnosed in 2009 in [place] and she recalls being told that it was of moderate severity.    She is being treated with CPAP therapy.  I have viewed her download and it indicates that she is set at a CPAP of 9 cm H2O.  She uses a nasal pillow mask.  She is satisfied with her current set up and does feel refreshed.  She does only average around 6 hours of sleep per night but she says this is her habit.  She typically does not nap during the day.  Her machine is about 2-1/2 years old and she is here for a new supply prescription and CPAP check    Further details are as follows:    Madison Scale is: 8/24    Estimated average amount of sleep per night: 6  Number of times she wakes up at night: 1  Difficulty falling back asleep: no  It usually takes 5 minutes to go to sleep.  She feels sleepy upon waking up: no  Rotating or night shift work: no    Drowsiness/Sleepiness:  She exhibits the following:  falls asleep watching TV    Snoring/Breathing: WITHOUT CPAP  She exhibits the following:  loud snoring  snores in all sleep positions  quits breathing at night  awakens gasping for breath  awakens with respiratory discomfort    Reflux:  She describes the following:  none    Narcolepsy:  She exhibits the following:  none    RLS/PLMs:  She describes the following:  none    Insomnia:  She describes the following:  none    Parasomnia:  She exhibits the following:  grinds teeth    Weight:  Weight change in the last year:  gain: 20 lbs      The patient's relevant past medical, surgical, family, and social history reviewed and updated in Epic as  "appropriate.    Current medications are:   Current Outpatient Medications:   •  amLODIPine (NORVASC) 5 MG tablet, Take 2 tablets by mouth Daily., Disp: 180 tablet, Rfl: 3  •  aspirin 81 MG chewable tablet, Chew 81 mg Daily., Disp: , Rfl:   •  atorvastatin (LIPITOR) 20 MG tablet, Take 1 tablet by mouth Daily., Disp: 30 tablet, Rfl: 5  •  azelastine (ASTELIN) 0.1 % nasal spray, 2 sprays into each nostril Daily. Use in each nostril as directed, Disp: , Rfl:   •  benazepril 20 MG tablet 20 mg, hydrochlorothiazide 25 MG tablet 25 mg, Take 1 tablet by mouth Daily., Disp: , Rfl:   •  cetirizine (zyrTEC) 10 MG tablet, TAKE ONE TABLET BY MOUTH DAILY, Disp: 60 tablet, Rfl: 0  •  dronedarone (MULTAQ) 400 MG tablet, Take 1 tablet by mouth 2 (Two) Times a Day With Meals., Disp: 180 tablet, Rfl: 3  •  escitalopram (LEXAPRO) 20 MG tablet, Take 1 tablet by mouth Daily., Disp: 30 tablet, Rfl: 5  •  FLUZONE HIGH-DOSE 0.5 ML suspension prefilled syringe injection, ADM 0.5ML IM UTD, Disp: , Rfl: 0  •  BOOSTRIX 5-2.5-18.5 LF-MCG/0.5 injection, ADM 0.5ML IM UTD, Disp: , Rfl: 0  •  fluticasone (VERAMYST) 27.5 MCG/SPRAY nasal spray, 2 sprays into the nostril(s) as directed by provider Daily., Disp: , Rfl:   •  predniSONE (DELTASONE) 10 MG tablet, Take 6 pills po QAM with food day 1, then decrease by 1 pill each day until gone. 6 day taper., Disp: 21 tablet, Rfl: 0.    Review of Systems    Review of Systems  ROS documented in patient questionnaire ×14 systems.  Reviewed with patient.  Otherwise negative except as noted in HPI.    Physical Exam    Height 172.7 cm (68\"), weight 98.9 kg (218 lb). Body mass index is 33.15 kg/m².    Physical Exam   Constitutional: She is oriented to person, place, and time. She appears well-developed and well-nourished.   HENT:   Head: Normocephalic and atraumatic.   Nose: Nose normal.   Class III airway   Eyes: Conjunctivae are normal. No scleral icterus.   Neck: Normal range of motion. No tracheal deviation " present.   Pulmonary/Chest: Effort normal. No respiratory distress.   Musculoskeletal: She exhibits no edema or deformity.   Neurological: She is alert and oriented to person, place, and time.   Skin: No rash noted. No erythema.   Psychiatric: She has a normal mood and affect. Her behavior is normal.       ASSESSMENT:    Problem List Items Addressed This Visit        Pulmonary Problems    BRI on CPAP - Primary    Relevant Orders    PAP Therapy       Other    Obesity (BMI 30-39.9)          65-year-old female with an 11-year old diagnosis of obstructive sleep apnea.  She has been using CPAP for 11 years.  Her current settings are 9 cm H2O.  A review of her download indicates a low level of mask leak and a normal AHI.  Her usage is somewhat low at 6.  She is well satisfied with her CPAP and appears to be benefiting from it.  Her machine is only 2-1/2 years old.  She needs a new mask and supplies.  She uses a nasal pillows mask.    PLAN:    1. I will refill/reorder needed supplies for the next year  2. No change in CPAP settings  3. I advised her to try to sleep between 7-8 hours per night  4. Further efforts at weight loss  5. Yearly follow-up or earlier for any new CPAP or sleep problems.    I have reviewed the results of my evaluation and impression and discussed my recommendations in detail with the patient.    This visit was performed remotely via a video connection    Level of Risk Moderate due to: two stable chronic illnesses    Signed by  Eligio Andrade MD    April 21, 2020      CC: Nathalie Andrews PA          No ref. provider found

## 2020-05-22 ENCOUNTER — OFFICE VISIT (OUTPATIENT)
Dept: CARDIOLOGY | Facility: CLINIC | Age: 65
End: 2020-05-22

## 2020-05-22 VITALS
TEMPERATURE: 97.8 F | SYSTOLIC BLOOD PRESSURE: 144 MMHG | DIASTOLIC BLOOD PRESSURE: 80 MMHG | OXYGEN SATURATION: 97 % | WEIGHT: 233 LBS | BODY MASS INDEX: 35.31 KG/M2 | HEART RATE: 73 BPM | HEIGHT: 68 IN

## 2020-05-22 DIAGNOSIS — I48.19 PERSISTENT ATRIAL FIBRILLATION (HCC): Primary | ICD-10-CM

## 2020-05-22 DIAGNOSIS — I10 ESSENTIAL HYPERTENSION: ICD-10-CM

## 2020-05-22 DIAGNOSIS — Z79.899 LONG TERM CURRENT USE OF ANTIARRHYTHMIC MEDICAL THERAPY: ICD-10-CM

## 2020-05-22 PROCEDURE — 93000 ELECTROCARDIOGRAM COMPLETE: CPT | Performed by: INTERNAL MEDICINE

## 2020-05-22 PROCEDURE — 99214 OFFICE O/P EST MOD 30 MIN: CPT | Performed by: INTERNAL MEDICINE

## 2020-05-22 RX ORDER — ATORVASTATIN CALCIUM 20 MG/1
20 TABLET, FILM COATED ORAL DAILY
Qty: 90 TABLET | Refills: 3 | Status: SHIPPED | OUTPATIENT
Start: 2020-05-22 | End: 2021-04-20 | Stop reason: SDUPTHER

## 2020-05-22 RX ORDER — AMLODIPINE BESYLATE 10 MG/1
10 TABLET ORAL DAILY
Qty: 90 TABLET | Refills: 3 | Status: SHIPPED | OUTPATIENT
Start: 2020-05-22 | End: 2021-04-20 | Stop reason: SDUPTHER

## 2020-05-22 NOTE — PROGRESS NOTES
Stefani Severino  1955  PCP: Nathalie Andrews PA    SUBJECTIVE:   Stefani Severino is a 65 y.o. female seen for a follow up visit regarding the following:     Chief Complaint: Follow up for afib     HPI:    Since last visit the patient's status has been stable. She has not had any afib. No recurrent issues with edema. Having issues with allergies and sinus drainage. Has been gaining wt from over eating.     History:  This is a 63-year-old patient with a history of atrial fibrillation treated in [facility], [place] for many years.  She was first diagnosed with atrial fibrillation in 2009.  She underwent a cardioversion at that time.  She had a follow-up cardioversion and 2014.  She was started on Multaq in 2014 has been doing well ever since.  She is very symptomatically from her atrial fibrillation.  During the A. fib events she had experiences tachycardia palpitations along with shortness of breath.        Cardiac PMH: (Old records have been reviewed and summarized below)  1.  Atrial fibrillation-diagnosed May 2009  2.  Cardioversions-May 2009, October 2014  3.  Start on Multaq therapy in October 2014  4.  Nuclear stress test-2007-negative for ischemia  5.  Hypertension  6.  Sleep apnea-on CPAP      Current Outpatient Medications:   •  amLODIPine (NORVASC) 5 MG tablet, Take 2 tablets by mouth Daily., Disp: 180 tablet, Rfl: 3  •  aspirin 81 MG chewable tablet, Chew 81 mg Daily., Disp: , Rfl:   •  atorvastatin (LIPITOR) 20 MG tablet, Take 1 tablet by mouth Daily., Disp: 30 tablet, Rfl: 5  •  azelastine (ASTELIN) 0.1 % nasal spray, 2 sprays into each nostril Daily. Use in each nostril as directed, Disp: , Rfl:   •  benazepril 20 MG tablet 20 mg, hydroCHLOROthiazide 25 MG tablet 25 mg, Take 1 tablet by mouth Daily., Disp: 90 tablet, Rfl: 3  •  cetirizine (zyrTEC) 10 MG tablet, TAKE ONE TABLET BY MOUTH DAILY, Disp: 60 tablet, Rfl: 0  •  dronedarone (MULTAQ) 400 MG tablet, Take 1 tablet by mouth  2 (Two) Times a Day With Meals., Disp: 180 tablet, Rfl: 3  •  escitalopram (LEXAPRO) 20 MG tablet, Take 1 tablet by mouth Daily., Disp: 30 tablet, Rfl: 5  •  fluticasone (VERAMYST) 27.5 MCG/SPRAY nasal spray, 2 sprays into the nostril(s) as directed by provider Daily., Disp: , Rfl:   •  Erenumab-aooe (Aimovig) 140 MG/ML solution auto-injector, Aimovig Autoinjector 140 mg/mL subcutaneous auto-injector  Inject 140 mg every month by subcutaneous route as directed for 30 days., Disp: , Rfl:     Past Medical History, Past Surgical History, Family history, Social History, and Medications were all reviewed with the patient today and updated as necessary.       Patient Active Problem List   Diagnosis   • Persistent atrial fibrillation   • Long term current use of antiarrhythmic medical therapy   • Essential hypertension   • Bilateral leg edema   • Generalized anxiety disorder   • BRI on CPAP   • Arthritis of right shoulder region   • Prediabetes   • Migraine   • Obesity (BMI 30-39.9)     Allergies   Allergen Reactions   • Dextroamphetamine Sulfate Hallucinations   • Penicillins Unknown (See Comments)     ALLERGY AS A CHILD AND DOESN'T RECALL REACTION     Past Medical History:   Diagnosis Date   • A-fib (CMS/HCC)    • Anxiety    • Diastolic dysfunction     grade 1 with impaired relaxation   • H/O total hysterectomy with bilateral salpingo-oophorectomy (BSO)    • Hearing loss    • History of appendectomy    • History of tonsillectomy    • History of tympanoplasty    • Hypercholesterolemia    • Hypertension    • Left atrial dilatation    • LVH (left ventricular hypertrophy)    • Sleep apnea    • Tinnitus      Past Surgical History:   Procedure Laterality Date   • APPENDECTOMY  1969   • HYSTERECTOMY  1984   • INNER EAR SURGERY Left 1997, 2005   • TONSILLECTOMY  1959     Family History   Problem Relation Age of Onset   • Dementia Mother    • Hydrocephalus Mother    • Ovarian cancer Mother 47   • Arthritis Mother    •  "Osteoporosis Mother    • Hyperthyroidism Mother    • Hypertension Mother    • Heart valve disorder Father    • Diabetes Father    • Heart disease Father         Valve issue   • Thyroid cancer Brother    • Hypertension Brother    • Hypertension Brother      Social History     Tobacco Use   • Smoking status: Former Smoker     Packs/day: 1.00     Years: 2.00     Pack years: 2.00     Types: Cigarettes     Last attempt to quit: 1974     Years since quittin.3   • Smokeless tobacco: Never Used   Substance Use Topics   • Alcohol use: Yes     Alcohol/week: 2.0 standard drinks     Types: 2 Glasses of wine per week     Comment: OCCASIONALLY         PHYSICAL EXAM:    /80 (BP Location: Left arm, Patient Position: Sitting)   Pulse 73   Temp 97.8 °F (36.6 °C)   Ht 172.7 cm (68\")   Wt 106 kg (233 lb)   SpO2 97%   BMI 35.43 kg/m²        Wt Readings from Last 5 Encounters:   20 106 kg (233 lb)   20 98.9 kg (218 lb)   19 98.9 kg (218 lb)   10/21/19 98.9 kg (218 lb)   19 100 kg (220 lb 9.6 oz)       BP Readings from Last 5 Encounters:   20 144/80   19 140/72   10/21/19 150/82   19 144/88   19 122/50       General-Well Nourished, Well developed  Eyes - PERRLA  Neck- supple, No mass  CV- regular rate and rhythm, no MRG, No edema  Lung- clear bilaterally  Abd- soft, +BS  Musc/skel - Norm strength and range of motion  Skin- warm and dry  Neuro - Alert & Oriented x 3, appropriate mood.        Medical problems and test results were reviewed with the patient today.     No results found for this or any previous visit (from the past 672 hour(s)).      EKG: (EKG has been independently visualized by me and summarized below)      ECG 12 Lead  Date/Time: 2020 10:30 AM  Performed by: Reddy Nino MD  Authorized by: Reddy Nino MD   Comparison: compared with previous ECG   Similar to previous ECG  Rhythm: sinus rhythm  Rate: normal  BPM: 71  ST Segments: ST " segments normal  T Waves: T waves normal    Clinical impression: normal ECG             ASSESSMENT and PLAN    1.  Atrial fibrillation-persistent in nature when it occurs.  Has required 2 cardioversions in the past.  Currently is doing well on Multaq therapy. No recurrent events recently  2.  Use of Multaq - monitor labs. EKG reviewed and stable.   3.  HTN -  Continue current regimen. Reports stable at home.       Return in about 6 months (around 11/22/2020).        Reddy Nino M.D., F.[email or web address].C.C, F.H.R.S.  Cardiology/Electrophysiology  5/22/2020  10:31

## 2020-06-29 RX ORDER — CETIRIZINE HYDROCHLORIDE 10 MG/1
TABLET ORAL
Qty: 60 TABLET | Refills: 5 | Status: SHIPPED | OUTPATIENT
Start: 2020-06-29 | End: 2021-04-20 | Stop reason: SDUPTHER

## 2020-08-24 ENCOUNTER — TELEPHONE (OUTPATIENT)
Dept: SURGERY | Facility: CLINIC | Age: 65
End: 2020-08-24

## 2020-08-24 ENCOUNTER — OFFICE VISIT (OUTPATIENT)
Dept: INTERNAL MEDICINE | Facility: CLINIC | Age: 65
End: 2020-08-24

## 2020-08-24 VITALS
WEIGHT: 228 LBS | SYSTOLIC BLOOD PRESSURE: 138 MMHG | OXYGEN SATURATION: 98 % | HEIGHT: 68 IN | HEART RATE: 79 BPM | TEMPERATURE: 98.4 F | BODY MASS INDEX: 34.56 KG/M2 | DIASTOLIC BLOOD PRESSURE: 70 MMHG

## 2020-08-24 DIAGNOSIS — Z00.00 MEDICARE ANNUAL WELLNESS VISIT, SUBSEQUENT: Primary | ICD-10-CM

## 2020-08-24 DIAGNOSIS — I10 ESSENTIAL HYPERTENSION: ICD-10-CM

## 2020-08-24 DIAGNOSIS — R73.03 PREDIABETES: ICD-10-CM

## 2020-08-24 DIAGNOSIS — Z00.00 PREVENTATIVE HEALTH CARE: ICD-10-CM

## 2020-08-24 DIAGNOSIS — Z12.11 SCREEN FOR COLON CANCER: ICD-10-CM

## 2020-08-24 DIAGNOSIS — E66.01 MORBID (SEVERE) OBESITY DUE TO EXCESS CALORIES (HCC): ICD-10-CM

## 2020-08-24 DIAGNOSIS — I48.19 PERSISTENT ATRIAL FIBRILLATION (HCC): ICD-10-CM

## 2020-08-24 DIAGNOSIS — Z11.59 NEED FOR HEPATITIS C SCREENING TEST: ICD-10-CM

## 2020-08-24 DIAGNOSIS — Z12.31 SCREENING MAMMOGRAM, ENCOUNTER FOR: ICD-10-CM

## 2020-08-24 PROBLEM — G43.709 NON-REFRACTORY CHRONIC MIGRAINE WITHOUT AURA: Status: ACTIVE | Noted: 2020-02-25

## 2020-08-24 PROCEDURE — G0439 PPPS, SUBSEQ VISIT: HCPCS | Performed by: PHYSICIAN ASSISTANT

## 2020-08-24 NOTE — PROGRESS NOTES
The ABCs of the Annual Wellness Visit  Subsequent Medicare Wellness Visit    Chief Complaint   Patient presents with   • Medicare Wellness-subsequent       Subjective   History of Present Illness:  Atrial fibrillation and hypertension managed by cardiology. She denies chest pain, dyspnea, orthopnea, palpitations, lower extremity edema, headaches, weakness, visual disturbances or confusion.     She has had difficulty losing weight despite being very physically active all summer. She admits she does not keep a good diet.         Stefani Severino is a 65 y.o. female who presents for a Subsequent Medicare Wellness Visit.    HEALTH RISK ASSESSMENT    Recent Hospitalizations:  No hospitalization(s) within the last year.    Current Medical Providers:  Patient Care Team:  Nathalie Andrews PA as PCP - General (Physician Assistant)    Smoking Status:  Social History     Tobacco Use   Smoking Status Former Smoker   • Packs/day: 1.00   • Years: 2.00   • Pack years: 2.00   • Types: Cigarettes   • Last attempt to quit: 1974   • Years since quittin.5   Smokeless Tobacco Never Used       Alcohol Consumption:  Social History     Substance and Sexual Activity   Alcohol Use Yes   • Alcohol/week: 2.0 standard drinks   • Types: 2 Glasses of wine per week    Comment: OCCASIONALLY       Depression Screen:   PHQ-2/PHQ-9 Depression Screening 2020   Little interest or pleasure in doing things 0   Feeling down, depressed, or hopeless 0   Total Score 0       Fall Risk Screen:  MARY Fall Risk Assessment was completed, and patient is at MODERATE risk for falls. Assessment completed on:2020    Health Habits and Functional and Cognitive Screening:  Functional & Cognitive Status 2020   Do you have difficulty preparing food and eating? No   Do you have difficulty bathing yourself, getting dressed or grooming yourself? No   Do you have difficulty using the toilet? No   Do you have difficulty moving around from  place to place? No   Do you have trouble with steps or getting out of a bed or a chair? No   Current Diet Well Balanced Diet   Dental Exam Up to date   Eye Exam Up to date   Exercise (times per week) 0 times per week   Current Exercise Activities Include None   Do you need help using the phone?  No   Are you deaf or do you have serious difficulty hearing?  Yes   Do you need help with transportation? No   Do you need help shopping? No   Do you need help preparing meals?  No   Do you need help with housework?  No   Do you need help with laundry? No   Do you need help taking your medications? No   Do you need help managing money? No   Do you ever drive or ride in a car without wearing a seat belt? No   Have you felt unusual stress, anger or loneliness in the last month? No   Who do you live with? Alone   If you need help, do you have trouble finding someone available to you? No   Do you have difficulty concentrating, remembering or making decisions? No         Does the patient have evidence of cognitive impairment? No    Asprin use counseling:Taking ASA appropriately as indicated    Age-appropriate Screening Schedule:  Refer to the list below for future screening recommendations based on patient's age, sex and/or medical conditions. Orders for these recommended tests are listed in the plan section. The patient has been provided with a written plan.    Health Maintenance   Topic Date Due   • LIPID PANEL  02/22/2020   • COLONOSCOPY  03/23/2020   • INFLUENZA VACCINE  08/01/2020   • MAMMOGRAM  02/22/2021   • TDAP/TD VACCINES (2 - Td) 10/21/2029   • ZOSTER VACCINE  Completed          The following portions of the patient's history were reviewed and updated as appropriate: allergies, current medications, past family history, past medical history, past social history, past surgical history and problem list.    Outpatient Medications Prior to Visit   Medication Sig Dispense Refill   • amLODIPine (NORVASC) 10 MG tablet Take 1  tablet by mouth Daily. 90 tablet 3   • aspirin 81 MG chewable tablet Chew 81 mg Daily.     • atorvastatin (LIPITOR) 20 MG tablet Take 1 tablet by mouth Daily. 90 tablet 3   • benazepril 20 MG tablet 20 mg, hydroCHLOROthiazide 25 MG tablet 25 mg Take 1 tablet by mouth Daily. 90 tablet 3   • cetirizine (zyrTEC) 10 MG tablet TAKE ONE TABLET BY MOUTH DAILY 60 tablet 5   • dronedarone (MULTAQ) 400 MG tablet Take 1 tablet by mouth 2 (Two) Times a Day With Meals. 180 tablet 3   • Erenumab-aooe (Aimovig, 140 MG Dose,) 70 MG/ML prefilled syringe Inject 70 mg under the skin into the appropriate area as directed 1 (One) Time.     • escitalopram (LEXAPRO) 20 MG tablet Take 1 tablet by mouth Daily. 30 tablet 5   • fluticasone (FLONASE) 50 MCG/ACT nasal spray      • triamcinolone (KENALOG) 0.1 % cream Apply to affected area tid prn itch 45 g 1   • Erenumab-aooe (Aimovig) 140 MG/ML solution auto-injector Aimovig Autoinjector 140 mg/mL subcutaneous auto-injector   Inject 140 mg every month by subcutaneous route as directed for 30 days.     • predniSONE (DELTASONE) 20 MG tablet 3 po daily for 5 days, 2 po daily for 5 days, 1 po daily for 5 days 30 tablet 0     No facility-administered medications prior to visit.        Patient Active Problem List   Diagnosis   • Persistent atrial fibrillation (CMS/HCC)   • Long term current use of antiarrhythmic medical therapy   • Essential hypertension   • Bilateral leg edema   • Generalized anxiety disorder   • BRI on CPAP   • Arthritis of right shoulder region   • Prediabetes   • Non-refractory chronic migraine without aura   • Obesity (BMI 30-39.9)       Advanced Care Planning:  ACP discussion was held with the patient during this visit. Patient does not have an advance directive, information provided.    Review of Systems   Constitutional: Negative for appetite change, chills, fatigue, fever and unexpected weight change.        Inability to lose weight.    HENT: Negative for congestion, ear  "pain, hearing loss, nosebleeds, postnasal drip, rhinorrhea, sore throat, tinnitus and trouble swallowing.    Eyes: Negative for photophobia, discharge and visual disturbance.   Respiratory: Negative for cough, chest tightness, shortness of breath and wheezing.    Cardiovascular: Negative for chest pain, palpitations and leg swelling.   Gastrointestinal: Negative for abdominal distention, abdominal pain, blood in stool, constipation, diarrhea, nausea and vomiting.   Endocrine: Negative for cold intolerance, heat intolerance, polydipsia, polyphagia and polyuria.   Genitourinary: Negative.    Musculoskeletal: Negative for arthralgias, back pain, joint swelling, myalgias, neck pain and neck stiffness.   Skin: Negative for color change, pallor, rash and wound.   Allergic/Immunologic: Negative for environmental allergies, food allergies and immunocompromised state.   Neurological: Negative for dizziness, tremors, seizures, weakness, numbness and headaches.   Hematological: Negative for adenopathy. Does not bruise/bleed easily.   Psychiatric/Behavioral: Negative for agitation, behavioral problems, confusion, hallucinations, self-injury and suicidal ideas. The patient is not nervous/anxious.        Compared to one year ago, the patient feels her physical health is the same.  Compared to one year ago, the patient feels her mental health is the same.    Reviewed chart for potential of high risk medication in the elderly: yes  Reviewed chart for potential of harmful drug interactions in the elderly:yes    Objective         Vitals:    08/24/20 0832   BP: 138/70   Pulse: 79   Temp: 98.4 °F (36.9 °C)   SpO2: 98%   Weight: 103 kg (228 lb)   Height: 172.7 cm (67.99\")       Body mass index is 34.68 kg/m².  Discussed the patient's BMI with her. The BMI is below average; BMI management plan is completed.    Physical Exam   Constitutional: She is oriented to person, place, and time. She appears well-developed and well-nourished. No " distress.   Obese.    HENT:   Head: Normocephalic and atraumatic.   Eyes: Pupils are equal, round, and reactive to light. Conjunctivae and EOM are normal. No scleral icterus.   Neck: Normal range of motion. Neck supple. No thyromegaly present.   Cardiovascular: Normal rate, regular rhythm and normal heart sounds. Exam reveals no gallop and no friction rub.   No murmur heard.  Pulmonary/Chest: Effort normal and breath sounds normal. No respiratory distress. She has no wheezes. She has no rales. She exhibits no tenderness.   Abdominal: Soft. Bowel sounds are normal. She exhibits no distension and no mass. There is no tenderness. There is no rebound.   Musculoskeletal: Normal range of motion. She exhibits no edema, tenderness or deformity.   Lymphadenopathy:     She has no cervical adenopathy.   Neurological: She is alert and oriented to person, place, and time. She displays normal reflexes. No cranial nerve deficit or sensory deficit.   Skin: Skin is warm and dry. Capillary refill takes less than 2 seconds. No rash noted. She is not diaphoretic. No pallor.   Psychiatric: She has a normal mood and affect. Her behavior is normal. Judgment and thought content normal.   Nursing note and vitals reviewed.            Assessment/Plan   Medicare Risks and Personalized Health Plan  CMS Preventative Services Quick Reference  Advance Directive Discussion  Breast Cancer/Mammogram Screening  Cardiovascular risk  Diabetic Lab Screening   Obesity/Overweight     The above risks/problems have been discussed with the patient.  Pertinent information has been shared with the patient in the After Visit Summary.  Follow up plans and orders are seen below in the Assessment/Plan Section.    Diagnoses and all orders for this visit:    1. Medicare annual wellness visit, subsequent (Primary)    2. Morbid (severe) obesity due to excess calories (CMS/[facility])  Dietary improvement strongly encouraged.  Continue increased level of physical  activity.  Patient's Body mass index is 34.68 kg/m². BMI is above normal parameters. Recommendations include: exercise counseling and nutrition counseling.    3. Need for hepatitis C screening test  -     Hepatitis C Antibody    4. Persistent atrial fibrillation (CMS/HCC)  -     Comprehensive Metabolic Panel  -     CBC (No Diff)  -     TSH Rfx On Abnormal To Free T4  Continue Multaq and aspirin as directed by cardiology.    5. Prediabetes  -     Comprehensive Metabolic Panel  -     Hemoglobin A1c  Dietary improvement needed.  Continue increased physical activity.    6. Essential hypertension  -     Comprehensive Metabolic Panel  Continue amlodipine, benazepril and hydrochlorothiazide as directed by cardiology.    Follow heart healthy/low salt diet.  Avoid processed foods.  Monitor blood pressure as discussed.  Exercise as tolerated up to 30 minutes 5 days per week.     7. Screen for colon cancer  -     Ambulatory Referral to General Surgery    8. Preventative health care  -     Lipid Panel    9. Screening mammogram, encounter for  -     Mammo Screening Digital Tomosynthesis Bilateral With CAD      Follow Up:  Return in about 1 year (around 8/24/2021) for medicare wellness.     An After Visit Summary and PPPS were given to the patient.

## 2020-08-24 NOTE — PATIENT INSTRUCTIONS
Medicare Wellness  Personal Prevention Plan of Service     Date of Office Visit:  2020  Encounter Provider:  HARRISON Martinez  Place of Service:  [facility]  Patient Name: Stefani Severino  :  1955    As part of the Medicare Wellness portion of your visit today, we are providing you with this personalized preventive plan of services (PPPS). This plan is based upon recommendations of the [place] Preventive Services Task Force (USPSTF) and the Advisory Committee on Immunization Practices (ACIP).    This lists the preventive care services that should be considered, and provides dates of when you are due. Items listed as completed are up-to-date and do not require any further intervention.    Health Maintenance   Topic Date Due   • HEPATITIS C SCREENING  2019   • LIPID PANEL  2020   • INFLUENZA VACCINE  2020   • COLONOSCOPY  2020 (Originally 3/23/2020)   • MAMMOGRAM  2021   • MEDICARE ANNUAL WELLNESS  2021   • TDAP/TD VACCINES (2 - Td) 10/21/2029   • Pneumococcal Vaccine Once at 65 Years Old  Completed   • ZOSTER VACCINE  Completed       Orders Placed This Encounter   Procedures   • Mammo Screening Digital Tomosynthesis Bilateral With CAD   • Lipid Panel   • Comprehensive Metabolic Panel   • Hepatitis C Antibody   • Hemoglobin A1c   • CBC (No Diff)   • TSH Rfx On Abnormal To Free T4   • Ambulatory Referral to General Surgery     Referral Priority:   Routine     Referral Type:   Consultation     Referral Reason:   Specialty Services Required     Referred to Provider:   Felipa Guthrie MD     Requested Specialty:   General Surgery     Number of Visits Requested:   1       Return in about 1 year (around 2021) for medicare wellness.

## 2020-09-08 NOTE — TELEPHONE ENCOUNTER
PRESCREENING FOR OPEN ACCESS SCHEDULING    Stefani Severino, 1955  [id number]    09/08/20    If, the patient answers yes to any of the following questions the provider will be informed prior to scheduling open access for approval and documented in the chart.    [x]  Yes  [] No    1. Have you ever had a colonoscopy in the past?      When:   Over 10 years ago      Where:       Polyps or other:     []  Yes  [] No    2. Family history of colon cancer?      Relation:       Age of onset:       Do you currently have any of the following?    []  Yes  [x] No  Rectal bleeding, if so, how long?     []  Yes  [x] No  Abdominal pain, if so, how long?    []  Yes  [x] No  Constipation, if so, how long?    []  Yes  [x] No  Diarrhea, if so, how long?    []  Yes  [x] No  Weight loss, is so, how much?    [] Yes  [x] No  Small caliber stool, if so, how long?      Have you ever had any of the following conditions?    [] Yes  [x] No  Heart attack?      When?       Last cardiac workup?     Blood thinners?    [] Yes  [x] No   Lung problems, asthma or COPD?  [] Yes  [x] No  Oxygen required?       [] Yes  [x] No  Stroke?     [] Yes  [x] No  Have you ever had a reaction to anesthesia?         Patient is scheduled for a colonoscopy on 11/30/20.

## 2020-09-23 ENCOUNTER — TELEPHONE (OUTPATIENT)
Dept: INTERNAL MEDICINE | Facility: CLINIC | Age: 65
End: 2020-09-23

## 2020-09-23 NOTE — TELEPHONE ENCOUNTER
Caller: Stefani Severino    Relationship: Self    Best call back number: [phone]     What orders are you requesting (i.e. lab or imaging): CT SCAN    In what timeframe would the patient need to come in: AS SOON AS POSSIBLE    Where will you receive your lab/imaging services: WITH [facility]    Additional notes: PT WENT TO URGENT CARE YESTERDAY AND THEY STATED IF SHE WAS NOT FEELING WELL BY TOMORROW THAT SHE WOULD NEED TO REQUEST A CT SCAN.

## 2020-09-24 NOTE — TELEPHONE ENCOUNTER
Please check and see how she is today compared to yesterday as it has now been 2 days since antibiotics were started.

## 2020-09-24 NOTE — TELEPHONE ENCOUNTER
Patient informed of message. Patient stated she is still having pain in her lower back and left side, urgent urination, and urine has a strong, foul odor. Patient would like to know if she needs to have a CT scan since she is still not feeling well.    Please call and advise. Patient call back [phone]

## 2020-10-12 ENCOUNTER — OFFICE VISIT (OUTPATIENT)
Dept: INTERNAL MEDICINE | Facility: CLINIC | Age: 65
End: 2020-10-12

## 2020-10-12 VITALS
HEART RATE: 60 BPM | WEIGHT: 226.8 LBS | HEIGHT: 68 IN | BODY MASS INDEX: 34.37 KG/M2 | DIASTOLIC BLOOD PRESSURE: 68 MMHG | SYSTOLIC BLOOD PRESSURE: 126 MMHG | TEMPERATURE: 97.7 F

## 2020-10-12 DIAGNOSIS — Z99.89 OSA ON CPAP: ICD-10-CM

## 2020-10-12 DIAGNOSIS — I48.19 PERSISTENT ATRIAL FIBRILLATION (HCC): ICD-10-CM

## 2020-10-12 DIAGNOSIS — G43.709 NON-REFRACTORY CHRONIC MIGRAINE WITHOUT AURA: ICD-10-CM

## 2020-10-12 DIAGNOSIS — Z11.59 ENCOUNTER FOR HEPATITIS C SCREENING TEST FOR LOW RISK PATIENT: ICD-10-CM

## 2020-10-12 DIAGNOSIS — F41.1 GENERALIZED ANXIETY DISORDER: ICD-10-CM

## 2020-10-12 DIAGNOSIS — I10 ESSENTIAL HYPERTENSION: ICD-10-CM

## 2020-10-12 DIAGNOSIS — Z12.31 ENCOUNTER FOR SCREENING MAMMOGRAM FOR MALIGNANT NEOPLASM OF BREAST: ICD-10-CM

## 2020-10-12 DIAGNOSIS — R73.03 PREDIABETES: ICD-10-CM

## 2020-10-12 DIAGNOSIS — G47.33 OSA ON CPAP: ICD-10-CM

## 2020-10-12 DIAGNOSIS — N15.9 KIDNEY INFECTION: Primary | ICD-10-CM

## 2020-10-12 DIAGNOSIS — Z76.89 ENCOUNTER TO ESTABLISH CARE: ICD-10-CM

## 2020-10-12 PROBLEM — R60.0 BILATERAL LEG EDEMA: Status: RESOLVED | Noted: 2019-06-06 | Resolved: 2020-10-12

## 2020-10-12 LAB
BILIRUB BLD-MCNC: NEGATIVE MG/DL
CLARITY, POC: CLEAR
COLOR UR: YELLOW
GLUCOSE UR STRIP-MCNC: NEGATIVE MG/DL
KETONES UR QL: NEGATIVE
LEUKOCYTE EST, POC: NEGATIVE
NITRITE UR-MCNC: NEGATIVE MG/ML
PH UR: 7 [PH] (ref 5–8)
PROT UR STRIP-MCNC: NEGATIVE MG/DL
RBC # UR STRIP: NEGATIVE /UL
SP GR UR: 1.01 (ref 1–1.03)
UROBILINOGEN UR QL: NORMAL

## 2020-10-12 PROCEDURE — 81003 URINALYSIS AUTO W/O SCOPE: CPT | Performed by: NURSE PRACTITIONER

## 2020-10-12 PROCEDURE — 99214 OFFICE O/P EST MOD 30 MIN: CPT | Performed by: NURSE PRACTITIONER

## 2020-10-12 PROCEDURE — 90694 VACC AIIV4 NO PRSRV 0.5ML IM: CPT | Performed by: NURSE PRACTITIONER

## 2020-10-12 PROCEDURE — G0008 ADMIN INFLUENZA VIRUS VAC: HCPCS | Performed by: NURSE PRACTITIONER

## 2020-10-12 NOTE — PROGRESS NOTES
Stefani Severino  1955  [id number]  Patient Care Team:  Madelyn Love APRN as PCP - General (Internal Medicine)  Kortney Guaman MD as PCP - Claims Attributed  Reddy Nino MD as Consulting Physician (Cardiology)    Stefani Severino is a 65 y.o. here today to establish care.  TNo chief complaint on file.      HPI:     Retired from [employer] [facility] of [employer]. Moved here 2.5 ys ago from [place] and kept her NP there as pcp until last year.  Has seen HARRISON Andrews in [place].  She is transferring care to our practice.  She and her sig other who have been dating off and on for 20 yrs are remodeling a house right now.  Pyelshant tx at  on 9/22/20--sx resolved, needs to recheck ua today  Paroxsymal A fib: correlation with untreated BRI which was dx when she first developed a fib. only other episode after she had gone without cpap a few days.  Sees Dr. Nino every 6 mo  Hyperlipidemia: on statin, due for labs  Htn:  Rarely checks at home unless it feels high 140/90 at that time.  No leg swelling, cp, sob.  SHARDA:  lexapro 20mg tx sx well for many years, tried going off years ago and did have inc.sx   Migraines: Classic migraines with aura years ago.  No stopped when her sleep apnea was treated.  She then began to have dull daily headaches that were different from her past migraines.  After evaluation by ENT and Dr. Hartman, neurology she was found to have atypical migraines and is now on Aimovig.  Headaches have decreased tremendously over the past 6 months of treatment.  When she does have a headache she treats with 2 Tylenol which usually resolves the discomfort.  Past Medical History:   Diagnosis Date   • A-fib (CMS/HCC)    • Anxiety    • Diastolic dysfunction     grade 1 with impaired relaxation   • H/O total hysterectomy with bilateral salpingo-oophorectomy (BSO)    • Headache    • Hearing loss    • History of appendectomy    • History of tonsillectomy    • History of tympanoplasty    •  Hypercholesterolemia    • Hypertension    • Left atrial dilatation    • LVH (left ventricular hypertrophy)    • Obesity    • Sleep apnea    • Tinnitus      Past Surgical History:   Procedure Laterality Date   • APPENDECTOMY     • HYSTERECTOMY     • INNER EAR SURGERY Left ,    • TONSILLECTOMY       Family History   Problem Relation Age of Onset   • Dementia Mother    • Hydrocephalus Mother    • Ovarian cancer Mother 47   • Arthritis Mother    • Osteoporosis Mother    • Hyperthyroidism Mother    • Hypertension Mother    • Cancer Mother         female reproductive   • Heart valve disorder Father    • Diabetes Father    • Heart disease Father         Valve issue   • Thyroid cancer Brother    • Hypertension Brother    • Hypertension Brother      Social History     Tobacco Use   Smoking Status Former Smoker   • Packs/day: 1.00   • Years: 2.00   • Pack years: 2.00   • Types: Cigarettes   • Quit date: 1974   • Years since quittin.6   Smokeless Tobacco Never Used     Allergies   Allergen Reactions   • Dextroamphetamine Sulfate Hallucinations   • Penicillins Unknown (See Comments)     ALLERGY AS A CHILD AND DOESN'T RECALL REACTION       Current Outpatient Medications:   •  amLODIPine (NORVASC) 10 MG tablet, Take 1 tablet by mouth Daily., Disp: 90 tablet, Rfl: 3  •  aspirin 81 MG chewable tablet, Chew 81 mg Daily., Disp: , Rfl:   •  atorvastatin (LIPITOR) 20 MG tablet, Take 1 tablet by mouth Daily., Disp: 90 tablet, Rfl: 3  •  benazepril 20 MG tablet 20 mg, hydroCHLOROthiazide 25 MG tablet 25 mg, Take 1 tablet by mouth Daily., Disp: 90 tablet, Rfl: 3  •  cetirizine (zyrTEC) 10 MG tablet, TAKE ONE TABLET BY MOUTH DAILY, Disp: 60 tablet, Rfl: 5  •  Chlorphen-PE-Acetaminophen (CORICIDIN D COLD/FLU/SINUS PO), Take 2 tablets by mouth Daily As Needed., Disp: , Rfl:   •  dronedarone (MULTAQ) 400 MG tablet, Take 1 tablet by mouth 2 (Two) Times a Day With Meals., Disp: 180 tablet, Rfl: 3  •  Erenumab-aooe  "(Aimovig, 140 MG Dose,) 70 MG/ML prefilled syringe, Inject 70 mg under the skin into the appropriate area as directed Every 30 (Thirty) Days., Disp: , Rfl:   •  escitalopram (LEXAPRO) 20 MG tablet, Take 1 tablet by mouth Daily., Disp: 30 tablet, Rfl: 5  •  fluticasone (FLONASE) 50 MCG/ACT nasal spray, 2 sprays into the nostril(s) as directed by provider Daily As Needed., Disp: , Rfl:     Review of Systems   Constitutional: Negative for chills, fatigue and fever.   HENT: Negative for congestion, ear pain and sinus pressure.    Respiratory: Negative for cough, chest tightness, shortness of breath and wheezing.    Cardiovascular: Negative for chest pain and palpitations.   Gastrointestinal: Negative for abdominal pain, blood in stool and constipation.   Skin: Negative for color change.   Allergic/Immunologic: Negative for environmental allergies.   Neurological: Negative for dizziness, speech difficulty and headache.   Psychiatric/Behavioral: Negative for decreased concentration. The patient is not nervous/anxious.        /68 (BP Location: Left arm, Patient Position: Sitting, Cuff Size: Large Adult)   Pulse 60   Temp 97.7 °F (36.5 °C) (Temporal)   Ht 172.7 cm (67.99\")   Wt 103 kg (226 lb 12.8 oz)   BMI 34.49 kg/m²     Physical Exam  Constitutional:       Appearance: She is well-developed. She is obese.   HENT:      Head: Normocephalic and atraumatic.      Comments: *wearing mask  Eyes:      Conjunctiva/sclera: Conjunctivae normal.      Pupils: Pupils are equal, round, and reactive to light.   Neck:      Musculoskeletal: Normal range of motion and neck supple.   Cardiovascular:      Rate and Rhythm: Normal rate and regular rhythm.      Pulses: Normal pulses.      Heart sounds: Normal heart sounds.   Pulmonary:      Effort: Pulmonary effort is normal.      Breath sounds: Normal breath sounds.   Musculoskeletal: Normal range of motion.      Right lower leg: No edema.      Left lower leg: No edema.   Skin:     " General: Skin is warm and dry.   Neurological:      Mental Status: She is alert and oriented to person, place, and time.   Psychiatric:         Mood and Affect: Mood normal.         Behavior: Behavior normal.         Thought Content: Thought content normal.         Judgment: Judgment normal.         Results Review:  None    Assessment/Plan:  Patient Instructions   Problem List Items Addressed This Visit        Cardiovascular and Mediastinum    Persistent atrial fibrillation (CMS/HCC)    Relevant Medications    amLODIPine (NORVASC) 10 MG tablet    Essential hypertension    Relevant Medications    amLODIPine (NORVASC) 10 MG tablet    benazepril 20 MG tablet 20 mg, hydroCHLOROthiazide 25 MG tablet 25 mg    Other Relevant Orders    CBC & Differential    Comprehensive Metabolic Panel    Lipid Panel    TSH Rfx On Abnormal To Free T4    Microalbumin / Creatinine Urine Ratio - Urine, Clean Catch    Non-refractory chronic migraine without aura    Relevant Medications    aspirin 81 MG chewable tablet    escitalopram (LEXAPRO) 20 MG tablet    amLODIPine (NORVASC) 10 MG tablet    Erenumab-aooe (Aimovig, 140 MG Dose,) 70 MG/ML prefilled syringe       Respiratory    BRI on CPAP       Endocrine    Prediabetes    Overview     A1C 5.7 2/22/19.         Relevant Orders    Hemoglobin A1c       Other    Generalized anxiety disorder    Relevant Medications    escitalopram (LEXAPRO) 20 MG tablet      Other Visit Diagnoses     Kidney infection    -  Primary    f/u ua is normal    Relevant Orders    POC Urinalysis Dipstick, Automated (Completed)    Encounter for hepatitis C screening test for low risk patient        Relevant Orders    Hepatitis C Antibody    Encounter for screening mammogram for malignant neoplasm of breast        Relevant Orders    Mammo Screening Digital Tomosynthesis Bilateral With CAD    Encounter to establish care                 Diagnosis Plan   1. Kidney infection  POC Urinalysis Dipstick, Automated    f/u ua is  normal   2. Persistent atrial fibrillation (CMS/HCC)      continue meds and cardiology f/u   3. Essential hypertension  CBC & Differential    Comprehensive Metabolic Panel    Lipid Panel    TSH Rfx On Abnormal To Free T4    Microalbumin / Creatinine Urine Ratio - Urine, Clean Catch    continue meds and cards f/u   4. Prediabetes  Hemoglobin A1c    will check A1C with labs.   5. Encounter for hepatitis C screening test for low risk patient  Hepatitis C Antibody   6. Encounter for screening mammogram for malignant neoplasm of breast  Mammo Screening Digital Tomosynthesis Bilateral With CAD   7. Generalized anxiety disorder      continue lexapro   8. BRI on CPAP     9. Non-refractory chronic migraine without aura      on Aimovig   10. Encounter to establish care         There are no Patient Instructions on file for this visit.    Plan of care reviewed with patient at the conclusion of today's visit. Education was provided regarding diagnosis and management.  Patient verbalizes understanding of and agreement with management plan.    Return in about 6 months (around 4/12/2021).    * Please note that portions of this note were completed with a voice recognition program. Efforts were made to edit the dictation but occasionally words are transcribed.     BECKA Smith

## 2020-11-16 RX ORDER — ESCITALOPRAM OXALATE 20 MG/1
TABLET ORAL
Qty: 30 TABLET | Refills: 3 | Status: SHIPPED | OUTPATIENT
Start: 2020-11-16 | End: 2021-04-20 | Stop reason: SDUPTHER

## 2020-12-01 ENCOUNTER — OFFICE VISIT (OUTPATIENT)
Dept: CARDIOLOGY | Facility: CLINIC | Age: 65
End: 2020-12-01

## 2020-12-01 VITALS
HEIGHT: 68 IN | HEART RATE: 74 BPM | WEIGHT: 226 LBS | BODY MASS INDEX: 34.25 KG/M2 | TEMPERATURE: 97 F | SYSTOLIC BLOOD PRESSURE: 110 MMHG | DIASTOLIC BLOOD PRESSURE: 68 MMHG | OXYGEN SATURATION: 95 %

## 2020-12-01 DIAGNOSIS — I48.19 PERSISTENT ATRIAL FIBRILLATION (HCC): Primary | ICD-10-CM

## 2020-12-01 DIAGNOSIS — Z79.899 LONG TERM CURRENT USE OF ANTIARRHYTHMIC MEDICAL THERAPY: ICD-10-CM

## 2020-12-01 PROCEDURE — 93000 ELECTROCARDIOGRAM COMPLETE: CPT | Performed by: INTERNAL MEDICINE

## 2020-12-01 PROCEDURE — 99214 OFFICE O/P EST MOD 30 MIN: CPT | Performed by: INTERNAL MEDICINE

## 2020-12-01 NOTE — PROGRESS NOTES
Stefani Severino  1955  PCP: Madelyn Love APRN    SUBJECTIVE:   Stefani Severino is a 65 y.o. female seen for a follow up visit regarding the following:     Chief Complaint: Follow up for afib     HPI:    Since last visit the patient's status has been stable. She has not had any afib.  Has been gaining wt from over eating but trying to limit intake. Not exercising.     History:  This is a 65-year-old patient with a history of atrial fibrillation treated in [facility] for many years.  She was first diagnosed with atrial fibrillation in 2009.  She underwent a cardioversion at that time.  She had a follow-up cardioversion and 2014.  She was started on Multaq in 2014 has been doing well ever since.  She is very symptomatically from her atrial fibrillation.  During the A. fib events she had experiences tachycardia palpitations along with shortness of breath.        Cardiac PMH: (Old records have been reviewed and summarized below)  1.  Atrial fibrillation-diagnosed May 2009  2.  Cardioversions-May 2009, October 2014  3.  Start on Multaq therapy in October 2014  4.  Nuclear stress test-2007-negative for ischemia  5.  Hypertension  6.  Sleep apnea-on CPAP      Current Outpatient Medications:   •  amLODIPine (NORVASC) 10 MG tablet, Take 1 tablet by mouth Daily., Disp: 90 tablet, Rfl: 3  •  aspirin 81 MG chewable tablet, Chew 81 mg Daily., Disp: , Rfl:   •  atorvastatin (LIPITOR) 20 MG tablet, Take 1 tablet by mouth Daily., Disp: 90 tablet, Rfl: 3  •  benazepril 20 MG tablet 20 mg, hydroCHLOROthiazide 25 MG tablet 25 mg, Take 1 tablet by mouth Daily., Disp: 90 tablet, Rfl: 3  •  cetirizine (zyrTEC) 10 MG tablet, TAKE ONE TABLET BY MOUTH DAILY, Disp: 60 tablet, Rfl: 5  •  Chlorphen-PE-Acetaminophen (CORICIDIN D COLD/FLU/SINUS PO), Take 2 tablets by mouth Daily As Needed., Disp: , Rfl:   •  dronedarone (MULTAQ) 400 MG tablet, Take 1 tablet by mouth 2 (Two) Times a Day With Meals., Disp: 180  tablet, Rfl: 3  •  Erenumab-aooe (Aimovig, 140 MG Dose,) 70 MG/ML prefilled syringe, Inject 70 mg under the skin into the appropriate area as directed Every 30 (Thirty) Days., Disp: , Rfl:   •  escitalopram (LEXAPRO) 20 MG tablet, TAKE ONE TABLET BY MOUTH DAILY, Disp: 30 tablet, Rfl: 3  •  fluticasone (FLONASE) 50 MCG/ACT nasal spray, 2 sprays into the nostril(s) as directed by provider Daily As Needed., Disp: , Rfl:     Past Medical History, Past Surgical History, Family history, Social History, and Medications were all reviewed with the patient today and updated as necessary.       Patient Active Problem List   Diagnosis   • Persistent atrial fibrillation (CMS/HCC)   • Long term current use of antiarrhythmic medical therapy   • Essential hypertension   • Generalized anxiety disorder   • BRI on CPAP   • Arthritis of right shoulder region   • Prediabetes   • Non-refractory chronic migraine without aura   • Obesity (BMI 30-39.9)     Allergies   Allergen Reactions   • Dextroamphetamine Sulfate Hallucinations   • Penicillins Unknown (See Comments)     ALLERGY AS A CHILD AND DOESN'T RECALL REACTION     Past Medical History:   Diagnosis Date   • A-fib (CMS/HCC)    • Anxiety    • Diastolic dysfunction     grade 1 with impaired relaxation   • H/O total hysterectomy with bilateral salpingo-oophorectomy (BSO)    • Headache    • Hearing loss    • History of appendectomy    • History of tonsillectomy    • History of tympanoplasty    • Hypercholesterolemia    • Hypertension    • Left atrial dilatation    • LVH (left ventricular hypertrophy)    • Obesity    • Sleep apnea    • Tinnitus      Past Surgical History:   Procedure Laterality Date   • APPENDECTOMY  1969   • HYSTERECTOMY  1984   • INNER EAR SURGERY Left 1997, 2005   • TONSILLECTOMY  1959     Family History   Problem Relation Age of Onset   • Dementia Mother    • Hydrocephalus Mother    • Ovarian cancer Mother 47   • Arthritis Mother    • Osteoporosis Mother    •  "Hyperthyroidism Mother    • Hypertension Mother    • Cancer Mother         female reproductive   • Heart valve disorder Father    • Diabetes Father    • Heart disease Father         Valve issue   • Thyroid cancer Brother    • Hypertension Brother    • Hypertension Brother      Social History     Tobacco Use   • Smoking status: Former Smoker     Packs/day: 1.00     Years: 2.00     Pack years: 2.00     Types: Cigarettes     Quit date: 1974     Years since quittin.8   • Smokeless tobacco: Never Used   Substance Use Topics   • Alcohol use: Yes     Alcohol/week: 2.0 standard drinks     Types: 2 Glasses of wine per week     Frequency: 2-4 times a month     Drinks per session: 1 or 2     Binge frequency: Never     Comment: OCCASIONALLY         PHYSICAL EXAM:    /68 (BP Location: Left arm, Patient Position: Sitting)   Pulse 74   Temp 97 °F (36.1 °C)   Ht 172.7 cm (68\")   Wt 103 kg (226 lb)   SpO2 95%   BMI 34.36 kg/m²        Wt Readings from Last 5 Encounters:   20 103 kg (226 lb)   10/12/20 103 kg (226 lb 12.8 oz)   10/01/20 103 kg (228 lb)   20 104 kg (229 lb)   20 103 kg (228 lb)       BP Readings from Last 5 Encounters:   20 110/68   10/12/20 126/68   10/01/20 118/60   20 124/61   20 138/70       General-Well Nourished, Well developed  Eyes - PERRLA  Neck- supple, No mass  CV- regular rate and rhythm, no MRG, No edema  Lung- clear bilaterally  Abd- soft, +BS  Musc/skel - Norm strength and range of motion  Skin- warm and dry  Neuro - Alert & Oriented x 3, appropriate mood.        Medical problems and test results were reviewed with the patient today.     No results found for this or any previous visit (from the past 672 hour(s)).      EKG: (EKG has been independently visualized by me and summarized below)      ECG 12 Lead    Date/Time: 2020 12:08 PM  Performed by: Reddy Nino MD  Authorized by: Reddy Nino MD   Comparison: compared with previous " ECG   Similar to previous ECG  Rhythm: sinus rhythm  Rate: normal  BPM: 68    Clinical impression: normal ECG             ASSESSMENT and PLAN    1.  Atrial fibrillation-persistent in nature when it occurs.  Has required 2 cardioversions in the past.  Currently is doing well on Multaq therapy. No recurrent events recently  2.  Use of Multaq - monitor labs. EKG reviewed and stable.   3.  HTN -  Continue current regimen. Reports stable at home. Trying to get the wt down.       Return in about 6 months (around 6/1/2021).        Reddy Nino M.D., [email or web address], F.H.R.S.  Cardiology/Electrophysiology  12/1/2020  12:13 EST

## 2020-12-04 DIAGNOSIS — Z01.818 PREOP TESTING: Primary | ICD-10-CM

## 2020-12-04 RX ORDER — BISACODYL 5 MG
TABLET, DELAYED RELEASE (ENTERIC COATED) ORAL
Qty: 4 TABLET | Refills: 0 | Status: SHIPPED | OUTPATIENT
Start: 2020-12-04 | End: 2021-04-20

## 2020-12-07 ENCOUNTER — PREP FOR SURGERY (OUTPATIENT)
Dept: OTHER | Facility: HOSPITAL | Age: 65
End: 2020-12-07

## 2020-12-07 DIAGNOSIS — Z12.11 SCREEN FOR COLON CANCER: Primary | ICD-10-CM

## 2020-12-07 RX ORDER — SODIUM CHLORIDE 0.9 % (FLUSH) 0.9 %
10 SYRINGE (ML) INJECTION AS NEEDED
Status: CANCELLED | OUTPATIENT
Start: 2020-12-07

## 2020-12-07 RX ORDER — SODIUM CHLORIDE, SODIUM LACTATE, POTASSIUM CHLORIDE, CALCIUM CHLORIDE 600; 310; 30; 20 MG/100ML; MG/100ML; MG/100ML; MG/100ML
50 INJECTION, SOLUTION INTRAVENOUS CONTINUOUS
Status: CANCELLED | OUTPATIENT
Start: 2020-12-07

## 2020-12-07 RX ORDER — SODIUM CHLORIDE 0.9 % (FLUSH) 0.9 %
3 SYRINGE (ML) INJECTION EVERY 12 HOURS SCHEDULED
Status: CANCELLED | OUTPATIENT
Start: 2020-12-07

## 2020-12-08 ENCOUNTER — LAB (OUTPATIENT)
Dept: LAB | Facility: HOSPITAL | Age: 65
End: 2020-12-08

## 2020-12-08 ENCOUNTER — HOSPITAL ENCOUNTER (OUTPATIENT)
Dept: MAMMOGRAPHY | Facility: HOSPITAL | Age: 65
Discharge: HOME | End: 2020-12-08
Admitting: NURSE PRACTITIONER

## 2020-12-08 DIAGNOSIS — Z01.818 PREOP TESTING: ICD-10-CM

## 2020-12-08 DIAGNOSIS — Z12.31 ENCOUNTER FOR SCREENING MAMMOGRAM FOR MALIGNANT NEOPLASM OF BREAST: ICD-10-CM

## 2020-12-08 PROCEDURE — U0004 COV-19 TEST NON-CDC HGH THRU: HCPCS

## 2020-12-08 PROCEDURE — 77067 SCR MAMMO BI INCL CAD: CPT

## 2020-12-08 PROCEDURE — C9803 HOPD COVID-19 SPEC COLLECT: HCPCS

## 2020-12-08 PROCEDURE — 77063 BREAST TOMOSYNTHESIS BI: CPT

## 2020-12-09 ENCOUNTER — TELEPHONE (OUTPATIENT)
Dept: SURGERY | Facility: CLINIC | Age: 65
End: 2020-12-09

## 2020-12-09 LAB — SARS-COV-2 RNA RESP QL NAA+PROBE: NOT DETECTED

## 2021-01-04 ENCOUNTER — LAB (OUTPATIENT)
Dept: LAB | Facility: HOSPITAL | Age: 66
End: 2021-01-04

## 2021-01-04 DIAGNOSIS — I10 ESSENTIAL HYPERTENSION: ICD-10-CM

## 2021-01-04 DIAGNOSIS — R73.03 PREDIABETES: ICD-10-CM

## 2021-01-04 DIAGNOSIS — Z11.59 ENCOUNTER FOR HEPATITIS C SCREENING TEST FOR LOW RISK PATIENT: ICD-10-CM

## 2021-01-04 LAB
ALBUMIN SERPL-MCNC: 4.2 G/DL (ref 3.5–5.2)
ALBUMIN UR-MCNC: <1.2 MG/DL
ALBUMIN/GLOB SERPL: 1.8 G/DL
ALP SERPL-CCNC: 55 U/L (ref 39–117)
ALT SERPL W P-5'-P-CCNC: 27 U/L (ref 1–33)
ANION GAP SERPL CALCULATED.3IONS-SCNC: 8.1 MMOL/L (ref 5–15)
AST SERPL-CCNC: 24 U/L (ref 1–32)
BASOPHILS # BLD AUTO: 0.07 10*3/MM3 (ref 0–0.2)
BASOPHILS NFR BLD AUTO: 1.2 % (ref 0–1.5)
BILIRUB SERPL-MCNC: 0.4 MG/DL (ref 0–1.2)
BUN SERPL-MCNC: 13 MG/DL (ref 8–23)
BUN/CREAT SERPL: 14.6 (ref 7–25)
CALCIUM SPEC-SCNC: 8.8 MG/DL (ref 8.6–10.5)
CHLORIDE SERPL-SCNC: 104 MMOL/L (ref 98–107)
CHOLEST SERPL-MCNC: 181 MG/DL (ref 0–200)
CO2 SERPL-SCNC: 27.9 MMOL/L (ref 22–29)
CREAT SERPL-MCNC: 0.89 MG/DL (ref 0.57–1)
CREAT UR-MCNC: 92.4 MG/DL
DEPRECATED RDW RBC AUTO: 43 FL (ref 37–54)
EOSINOPHIL # BLD AUTO: 0.17 10*3/MM3 (ref 0–0.4)
EOSINOPHIL NFR BLD AUTO: 2.8 % (ref 0.3–6.2)
ERYTHROCYTE [DISTWIDTH] IN BLOOD BY AUTOMATED COUNT: 12.2 % (ref 12.3–15.4)
GFR SERPL CREATININE-BSD FRML MDRD: 63 ML/MIN/1.73
GLOBULIN UR ELPH-MCNC: 2.3 GM/DL
GLUCOSE SERPL-MCNC: 98 MG/DL (ref 65–99)
HBA1C MFR BLD: 5.8 % (ref 4.8–5.6)
HCT VFR BLD AUTO: 38.5 % (ref 34–46.6)
HCV AB SER DONR QL: NORMAL
HDLC SERPL-MCNC: 56 MG/DL (ref 40–60)
HGB BLD-MCNC: 12.9 G/DL (ref 12–15.9)
IMM GRANULOCYTES # BLD AUTO: 0.02 10*3/MM3 (ref 0–0.05)
IMM GRANULOCYTES NFR BLD AUTO: 0.3 % (ref 0–0.5)
LDLC SERPL CALC-MCNC: 91 MG/DL (ref 0–100)
LDLC/HDLC SERPL: 1.51 {RATIO}
LYMPHOCYTES # BLD AUTO: 1.17 10*3/MM3 (ref 0.7–3.1)
LYMPHOCYTES NFR BLD AUTO: 19.4 % (ref 19.6–45.3)
MCH RBC QN AUTO: 32.2 PG (ref 26.6–33)
MCHC RBC AUTO-ENTMCNC: 33.5 G/DL (ref 31.5–35.7)
MCV RBC AUTO: 96 FL (ref 79–97)
MICROALBUMIN/CREAT UR: NORMAL MG/G{CREAT}
MONOCYTES # BLD AUTO: 0.52 10*3/MM3 (ref 0.1–0.9)
MONOCYTES NFR BLD AUTO: 8.6 % (ref 5–12)
NEUTROPHILS NFR BLD AUTO: 4.09 10*3/MM3 (ref 1.7–7)
NEUTROPHILS NFR BLD AUTO: 67.7 % (ref 42.7–76)
NRBC BLD AUTO-RTO: 0 /100 WBC (ref 0–0.2)
PLATELET # BLD AUTO: 295 10*3/MM3 (ref 140–450)
PMV BLD AUTO: 10.4 FL (ref 6–12)
POTASSIUM SERPL-SCNC: 4.2 MMOL/L (ref 3.5–5.2)
PROT SERPL-MCNC: 6.5 G/DL (ref 6–8.5)
RBC # BLD AUTO: 4.01 10*6/MM3 (ref 3.77–5.28)
SODIUM SERPL-SCNC: 140 MMOL/L (ref 136–145)
TRIGL SERPL-MCNC: 201 MG/DL (ref 0–150)
TSH SERPL DL<=0.05 MIU/L-ACNC: 1.96 UIU/ML (ref 0.27–4.2)
VLDLC SERPL-MCNC: 34 MG/DL (ref 5–40)
WBC # BLD AUTO: 6.04 10*3/MM3 (ref 3.4–10.8)

## 2021-01-04 PROCEDURE — 82570 ASSAY OF URINE CREATININE: CPT

## 2021-01-04 PROCEDURE — 82043 UR ALBUMIN QUANTITATIVE: CPT

## 2021-01-04 PROCEDURE — 85025 COMPLETE CBC W/AUTO DIFF WBC: CPT

## 2021-01-04 PROCEDURE — 80061 LIPID PANEL: CPT

## 2021-01-04 PROCEDURE — 84443 ASSAY THYROID STIM HORMONE: CPT

## 2021-01-04 PROCEDURE — 80053 COMPREHEN METABOLIC PANEL: CPT

## 2021-01-04 PROCEDURE — 83036 HEMOGLOBIN GLYCOSYLATED A1C: CPT

## 2021-01-04 PROCEDURE — 86803 HEPATITIS C AB TEST: CPT

## 2021-03-02 ENCOUNTER — APPOINTMENT (OUTPATIENT)
Dept: PREADMISSION TESTING | Facility: HOSPITAL | Age: 66
End: 2021-03-02

## 2021-03-04 ENCOUNTER — LAB (OUTPATIENT)
Dept: LAB | Facility: HOSPITAL | Age: 66
End: 2021-03-04

## 2021-03-04 ENCOUNTER — TRANSCRIBE ORDERS (OUTPATIENT)
Dept: LAB | Facility: HOSPITAL | Age: 66
End: 2021-03-04

## 2021-03-04 DIAGNOSIS — Z01.818 PRE-OP EXAMINATION: ICD-10-CM

## 2021-03-04 DIAGNOSIS — Z01.812 PRE-OPERATIVE LABORATORY EXAMINATION: Primary | ICD-10-CM

## 2021-03-04 DIAGNOSIS — Z01.812 PRE-OPERATIVE LABORATORY EXAMINATION: ICD-10-CM

## 2021-03-04 LAB
ALBUMIN SERPL-MCNC: 4.2 G/DL (ref 3.5–5.2)
ALBUMIN/GLOB SERPL: 1.8 G/DL
ALP SERPL-CCNC: 62 U/L (ref 39–117)
ALT SERPL W P-5'-P-CCNC: 27 U/L (ref 1–33)
ANION GAP SERPL CALCULATED.3IONS-SCNC: 8.6 MMOL/L (ref 5–15)
AST SERPL-CCNC: 17 U/L (ref 1–32)
BASOPHILS # BLD AUTO: 0.06 10*3/MM3 (ref 0–0.2)
BASOPHILS NFR BLD AUTO: 0.8 % (ref 0–1.5)
BILIRUB SERPL-MCNC: 0.5 MG/DL (ref 0–1.2)
BUN SERPL-MCNC: 26 MG/DL (ref 8–23)
BUN/CREAT SERPL: 23.4 (ref 7–25)
CALCIUM SPEC-SCNC: 9 MG/DL (ref 8.6–10.5)
CHLORIDE SERPL-SCNC: 100 MMOL/L (ref 98–107)
CO2 SERPL-SCNC: 30.4 MMOL/L (ref 22–29)
CREAT SERPL-MCNC: 1.11 MG/DL (ref 0.57–1)
DEPRECATED RDW RBC AUTO: 44.2 FL (ref 37–54)
EOSINOPHIL # BLD AUTO: 0.12 10*3/MM3 (ref 0–0.4)
EOSINOPHIL NFR BLD AUTO: 1.7 % (ref 0.3–6.2)
ERYTHROCYTE [DISTWIDTH] IN BLOOD BY AUTOMATED COUNT: 12.5 % (ref 12.3–15.4)
GFR SERPL CREATININE-BSD FRML MDRD: 49 ML/MIN/1.73
GLOBULIN UR ELPH-MCNC: 2.4 GM/DL
GLUCOSE SERPL-MCNC: 159 MG/DL (ref 65–99)
HCT VFR BLD AUTO: 39 % (ref 34–46.6)
HGB BLD-MCNC: 13.5 G/DL (ref 12–15.9)
IMM GRANULOCYTES # BLD AUTO: 0.01 10*3/MM3 (ref 0–0.05)
IMM GRANULOCYTES NFR BLD AUTO: 0.1 % (ref 0–0.5)
LYMPHOCYTES # BLD AUTO: 1.55 10*3/MM3 (ref 0.7–3.1)
LYMPHOCYTES NFR BLD AUTO: 21.5 % (ref 19.6–45.3)
MCH RBC QN AUTO: 33.5 PG (ref 26.6–33)
MCHC RBC AUTO-ENTMCNC: 34.6 G/DL (ref 31.5–35.7)
MCV RBC AUTO: 96.8 FL (ref 79–97)
MONOCYTES # BLD AUTO: 0.53 10*3/MM3 (ref 0.1–0.9)
MONOCYTES NFR BLD AUTO: 7.3 % (ref 5–12)
NEUTROPHILS NFR BLD AUTO: 4.95 10*3/MM3 (ref 1.7–7)
NEUTROPHILS NFR BLD AUTO: 68.6 % (ref 42.7–76)
NRBC BLD AUTO-RTO: 0 /100 WBC (ref 0–0.2)
PLATELET # BLD AUTO: 326 10*3/MM3 (ref 140–450)
PMV BLD AUTO: 10.4 FL (ref 6–12)
POTASSIUM SERPL-SCNC: 3.9 MMOL/L (ref 3.5–5.2)
PROT SERPL-MCNC: 6.6 G/DL (ref 6–8.5)
RBC # BLD AUTO: 4.03 10*6/MM3 (ref 3.77–5.28)
SODIUM SERPL-SCNC: 139 MMOL/L (ref 136–145)
WBC # BLD AUTO: 7.22 10*3/MM3 (ref 3.4–10.8)

## 2021-03-04 PROCEDURE — 80053 COMPREHEN METABOLIC PANEL: CPT

## 2021-03-04 PROCEDURE — 85025 COMPLETE CBC W/AUTO DIFF WBC: CPT

## 2021-03-04 PROCEDURE — 36415 COLL VENOUS BLD VENIPUNCTURE: CPT

## 2021-03-05 ENCOUNTER — APPOINTMENT (OUTPATIENT)
Dept: LAB | Facility: HOSPITAL | Age: 66
End: 2021-03-05

## 2021-03-05 ENCOUNTER — LAB (OUTPATIENT)
Dept: LAB | Facility: HOSPITAL | Age: 66
End: 2021-03-05

## 2021-03-05 DIAGNOSIS — Z01.818 PRE-OP EXAMINATION: ICD-10-CM

## 2021-03-05 PROCEDURE — U0005 INFEC AGEN DETEC AMPLI PROBE: HCPCS

## 2021-03-05 PROCEDURE — C9803 HOPD COVID-19 SPEC COLLECT: HCPCS

## 2021-03-05 PROCEDURE — U0004 COV-19 TEST NON-CDC HGH THRU: HCPCS

## 2021-03-06 LAB — SARS-COV-2 RNA RESP QL NAA+PROBE: NOT DETECTED

## 2021-04-06 PROBLEM — H71.92 CHOLESTEATOMA OF LEFT EAR: Status: ACTIVE | Noted: 2021-04-06

## 2021-04-07 DIAGNOSIS — Z01.818 PREOP TESTING: Primary | ICD-10-CM

## 2021-04-16 ENCOUNTER — TELEPHONE (OUTPATIENT)
Dept: SURGERY | Facility: CLINIC | Age: 66
End: 2021-04-16

## 2021-04-16 NOTE — TELEPHONE ENCOUNTER
Patient called and left a message that she needed to reschedule her colonoscopy.  Attempted to call patient, no answer.

## 2021-04-20 ENCOUNTER — OFFICE VISIT (OUTPATIENT)
Dept: INTERNAL MEDICINE | Facility: CLINIC | Age: 66
End: 2021-04-20

## 2021-04-20 VITALS
TEMPERATURE: 97.8 F | DIASTOLIC BLOOD PRESSURE: 56 MMHG | HEART RATE: 60 BPM | WEIGHT: 215 LBS | HEIGHT: 68 IN | SYSTOLIC BLOOD PRESSURE: 108 MMHG | BODY MASS INDEX: 32.58 KG/M2

## 2021-04-20 DIAGNOSIS — F41.1 GENERALIZED ANXIETY DISORDER: Primary | ICD-10-CM

## 2021-04-20 DIAGNOSIS — R73.03 PREDIABETES: ICD-10-CM

## 2021-04-20 DIAGNOSIS — E78.2 MIXED HYPERLIPIDEMIA: ICD-10-CM

## 2021-04-20 DIAGNOSIS — E66.9 OBESITY (BMI 30-39.9): ICD-10-CM

## 2021-04-20 DIAGNOSIS — G43.709 NON-REFRACTORY CHRONIC MIGRAINE WITHOUT AURA: ICD-10-CM

## 2021-04-20 DIAGNOSIS — I10 ESSENTIAL HYPERTENSION: ICD-10-CM

## 2021-04-20 DIAGNOSIS — Z12.11 COLON CANCER SCREENING: ICD-10-CM

## 2021-04-20 PROCEDURE — 99214 OFFICE O/P EST MOD 30 MIN: CPT | Performed by: NURSE PRACTITIONER

## 2021-04-20 RX ORDER — CETIRIZINE HYDROCHLORIDE 10 MG/1
10 TABLET ORAL DAILY
Qty: 90 TABLET | Refills: 3 | Status: SHIPPED | OUTPATIENT
Start: 2021-04-20 | End: 2022-03-18

## 2021-04-20 RX ORDER — ATORVASTATIN CALCIUM 20 MG/1
20 TABLET, FILM COATED ORAL DAILY
Qty: 90 TABLET | Refills: 3 | Status: SHIPPED | OUTPATIENT
Start: 2021-04-20 | End: 2022-06-07 | Stop reason: SDUPTHER

## 2021-04-20 RX ORDER — ASPIRIN 81 MG/1
81 TABLET, CHEWABLE ORAL DAILY
Qty: 90 TABLET | Refills: 3 | Status: SHIPPED | OUTPATIENT
Start: 2021-04-20 | End: 2021-07-19

## 2021-04-20 RX ORDER — ESCITALOPRAM OXALATE 20 MG/1
20 TABLET ORAL DAILY
Qty: 90 TABLET | Refills: 3 | Status: SHIPPED | OUTPATIENT
Start: 2021-04-20 | End: 2022-06-07 | Stop reason: SDUPTHER

## 2021-04-20 RX ORDER — AMLODIPINE BESYLATE 10 MG/1
10 TABLET ORAL DAILY
Qty: 90 TABLET | Refills: 3 | Status: SHIPPED | OUTPATIENT
Start: 2021-04-20 | End: 2022-07-11

## 2021-04-20 RX ORDER — FLUTICASONE PROPIONATE 50 MCG
2 SPRAY, SUSPENSION (ML) NASAL DAILY PRN
Qty: 16 ML | Refills: 5 | Status: SHIPPED | OUTPATIENT
Start: 2021-04-20 | End: 2022-01-24

## 2021-04-20 NOTE — PATIENT INSTRUCTIONS
"The patient was counseled on goals and the need for weight reduction.   The body mass index (BMI), which is a ratio of weight to height, while not a perfect measurement of body fat, does correlate strongly with various metabolic and disease outcomes. A BMI in the 18.5 - 24.9 range is considered a normal or healthy weight. A BMI above 25 is considered overweight.  A BMI of 30 or above is considered obese.  A BMI of 35 and above with any chronic conditions (such as hypertension, hyperlipidemia or Coronary Artery Disease is considered morbidly obese. Even an initial 10% reduction in body weight can offer important health benefits and reduce long-term cardiovascular risk.     While there are an array of popular/fad diets being promoted, I generally recommend a diet of vegetables, fruits, whole grains, and lean protein sources including low-fat dairy products, poultry, and nuts with an emphasis toward minimizing intake of sweets, carbohydrates, and red meats. Obtaining 30-40 minutes of moderate to vigorous-intensity aerobic exercise on 4-5 days weekly will assist you in reaching your optimal weight, not to mention the cardiovascular benefit, although no amount of exercise will usually overcome the effects of a poor diet. (Moderate activity means you can talk, but not sing, while you are active.) While this may seem like an insurmountable task at first to some, adding 10 minutes of aerobic activity to your current amount of exercise gradually can help you reach the above goal. The [employer] [place] [employer]'s \"Aim for a Healthy Weight\" website offers practical information on how you may improve your current diet and exercise habits. I have included the website's URL here for your reference: https://www.nhlbi.nih.gov/health/educational/lose_wt/index.htm. A nutritionist can help support you in developing an individualized diet plan. If you are interested, I would be happy to place a referral. Just let me know.           "

## 2021-04-20 NOTE — PROGRESS NOTES
tSefani Severino  1955  [id number]  Patient Care Team:  Madelyn Love APRN as PCP - General (Internal Medicine)  Reddy Nino MD as Consulting Physician (Cardiology)  Nilsa Jackman David M, MD as Consulting Physician (Neurology)  Kortney Guaman MD as Consulting Physician (Otolaryngology)    Stefani Severino is a pleasant 66 y.o. female who presents for evaluation of Hypertension (6 month follow up )    Chief Complaint   Patient presents with   • Hypertension     6 month follow up        HPI:   Still working on house remodel, but winding down and ready to move in.  Feeling well overall.  Anterior shin red rash this week, resolving, itched at first, using hydrocortisone cream.  colonosocpy was scheduled for Mon but she had forgotten and would like to do Cologuard right now.  Last c-scope 10 yrs ago, normal.  Mat aunt  of colon CA  Paroxsymal A fib: correlation with untreated BRI which was dx when she first developed a fib. only other episode after she had gone without cpap a few days. Sees Dr. Nino every 6 mo.  On asa and multaq daily.  Hyperlipidemia: on statin, due for labs  Htn:  Rarely checks at home unless it feels high 140/90 at that time.  No leg swelling, cp, sob.  Amlodipine 10, benzepril/hctz 20/25 daily, no leg swelling, cp, sob  SHARDA:  lexapro 20mg tx sx well for many years  Migraines: Classic migraines with aura years ago.  improved when her sleep apnea was treated.  She then began to have dull daily headaches that were different from her past migraines.  After evaluation by ENT and Dr. Hartman (neurology) she was found to have atypical migraines and is now on Aimovig.  Headaches have decreased tremendously over the past 12 months of treatment, seldom has HA now.  When she does have a headache she treats with 2 Tylenol which usually resolves the discomfort.  Past Medical History:   Diagnosis Date   • A-fib (CMS/[facility])    • Anxiety    • Diastolic  dysfunction     grade 1 with impaired relaxation   • H/O total hysterectomy with bilateral salpingo-oophorectomy (BSO)    • Headache    • Hearing loss    • History of appendectomy    • History of tonsillectomy    • History of tympanoplasty    • Hypercholesterolemia    • Hypertension    • Left atrial dilatation    • LVH (left ventricular hypertrophy)    • Obesity    • Sleep apnea    • Tinnitus      Past Surgical History:   Procedure Laterality Date   • APPENDECTOMY     • HYSTERECTOMY     • INNER EAR SURGERY Left ,    • OOPHORECTOMY     • REDUCTION MAMMAPLASTY     • TONSILLECTOMY       Family History   Problem Relation Age of Onset   • Dementia Mother    • Hydrocephalus Mother    • Ovarian cancer Mother 47   • Arthritis Mother    • Osteoporosis Mother    • Hyperthyroidism Mother    • Hypertension Mother    • Cancer Mother         female reproductive   • Heart valve disorder Father    • Diabetes Father    • Heart disease Father         Valve issue   • Thyroid cancer Brother    • Hypertension Brother    • Hypertension Brother    • Breast cancer Neg Hx      Social History     Tobacco Use   Smoking Status Former Smoker   • Packs/day: 1.00   • Years: 2.00   • Pack years: 2.00   • Types: Cigarettes   • Quit date: 1974   • Years since quittin.2   Smokeless Tobacco Never Used     Allergies   Allergen Reactions   • Dextroamphetamine Sulfate Hallucinations   • Penicillins Unknown (See Comments)     ALLERGY AS A CHILD AND DOESN'T RECALL REACTION       Current Outpatient Medications:   •  amLODIPine (NORVASC) 10 MG tablet, Take 1 tablet by mouth Daily., Disp: 90 tablet, Rfl: 3  •  aspirin 81 MG chewable tablet, Chew 1 tablet Daily., Disp: 90 tablet, Rfl: 3  •  atorvastatin (LIPITOR) 20 MG tablet, Take 1 tablet by mouth Daily., Disp: 90 tablet, Rfl: 3  •  benazepril 20 MG tablet 20 mg, hydroCHLOROthiazide 25 MG tablet 25 mg, Take 1 tablet by mouth Daily., Disp: 90 tablet, Rfl: 3  •  cetirizine  "(zyrTEC) 10 MG tablet, Take 1 tablet by mouth Daily., Disp: 90 tablet, Rfl: 3  •  dronedarone (MULTAQ) 400 MG tablet, Take 1 tablet by mouth 2 (Two) Times a Day With Meals., Disp: 180 tablet, Rfl: 3  •  Erenumab-aooe (Aimovig, 140 MG Dose,) 70 MG/ML prefilled syringe, Inject 70 mg under the skin into the appropriate area as directed Every 30 (Thirty) Days., Disp: , Rfl:   •  escitalopram (LEXAPRO) 20 MG tablet, Take 1 tablet by mouth Daily., Disp: 90 tablet, Rfl: 3  •  fluticasone (FLONASE) 50 MCG/ACT nasal spray, 2 sprays into the nostril(s) as directed by provider Daily As Needed for Rhinitis or Allergies., Disp: 16 mL, Rfl: 5    Review of Systems   Constitutional: Negative for chills, fatigue and fever.   HENT: Positive for ear pain. Negative for congestion and sinus pressure.    Respiratory: Negative for cough, chest tightness, shortness of breath and wheezing.    Cardiovascular: Negative for chest pain and palpitations.   Gastrointestinal: Negative for abdominal pain, blood in stool and constipation.   Neurological: Positive for dizziness. Negative for speech difficulty and headache.   Psychiatric/Behavioral: Negative for decreased concentration. The patient is not nervous/anxious.      /56 (BP Location: Left arm, Patient Position: Sitting, Cuff Size: Large Adult)   Pulse 60   Temp 97.8 °F (36.6 °C) (Temporal)   Ht 172.7 cm (67.99\")   Wt 97.5 kg (215 lb)   BMI 32.70 kg/m²     Physical Exam  Constitutional:       Appearance: She is well-developed. She is obese.   HENT:      Head: Normocephalic and atraumatic.      Comments: *wearing mask  Eyes:      Conjunctiva/sclera: Conjunctivae normal.      Pupils: Pupils are equal, round, and reactive to light.   Cardiovascular:      Rate and Rhythm: Normal rate and regular rhythm.      Pulses: Normal pulses.      Heart sounds: Normal heart sounds.   Pulmonary:      Effort: Pulmonary effort is normal.      Breath sounds: Normal breath sounds.   Musculoskeletal:   "       General: Normal range of motion.      Cervical back: Normal range of motion and neck supple.      Right lower leg: No edema.      Left lower leg: No edema.   Skin:     General: Skin is warm and dry.   Neurological:      Mental Status: She is alert and oriented to person, place, and time.   Psychiatric:         Mood and Affect: Mood normal.         Behavior: Behavior normal.         Thought Content: Thought content normal.         Judgment: Judgment normal.         Procedures    Results Review:  None    PHQ-9 Total Score: 0    Assessment/Plan:  Diagnoses and all orders for this visit:    1. Generalized anxiety disorder (Primary)  Comments:  Continue Lexapro daily    2. Colon cancer screening  Comments:  Will order Cologuard  Orders:  -     Cologuard - Stool, Per Rectum; Future    3. Essential hypertension  Comments:  Continue current meds, follow-up with Dr. Nino  Orders:  -     amLODIPine (NORVASC) 10 MG tablet; Take 1 tablet by mouth Daily.  Dispense: 90 tablet; Refill: 3  -     CBC & Differential; Future  -     Comprehensive Metabolic Panel; Future    4. Prediabetes  Comments:  A1C with labs this week  Orders:  -     Hemoglobin A1c; Future    5. Mixed hyperlipidemia  Comments:  Continue daily statin, follow-ups with Dr. Nino, fasting labs this week  Orders:  -     Lipid Panel; Future    6. Non-refractory chronic migraine without aura  Comments:  Continue Aimovig and Tylenol as needed    7. Obesity (BMI 30-39.9)  Comments:  Weight loss encouraged    Other orders  -     cetirizine (zyrTEC) 10 MG tablet; Take 1 tablet by mouth Daily.  Dispense: 90 tablet; Refill: 3  -     escitalopram (LEXAPRO) 20 MG tablet; Take 1 tablet by mouth Daily.  Dispense: 90 tablet; Refill: 3  -     fluticasone (FLONASE) 50 MCG/ACT nasal spray; 2 sprays into the nostril(s) as directed by provider Daily As Needed for Rhinitis or Allergies.  Dispense: 16 mL; Refill: 5  -     atorvastatin (LIPITOR) 20 MG tablet; Take 1 tablet by  "mouth Daily.  Dispense: 90 tablet; Refill: 3  -     aspirin 81 MG chewable tablet; Chew 1 tablet Daily.  Dispense: 90 tablet; Refill: 3       Patient Instructions   The patient was counseled on goals and the need for weight reduction.   The body mass index (BMI), which is a ratio of weight to height, while not a perfect measurement of body fat, does correlate strongly with various metabolic and disease outcomes. A BMI in the 18.5 - 24.9 range is considered a normal or healthy weight. A BMI above 25 is considered overweight.  A BMI of 30 or above is considered obese.  A BMI of 35 and above with any chronic conditions (such as hypertension, hyperlipidemia or Coronary Artery Disease is considered morbidly obese. Even an initial 10% reduction in body weight can offer important health benefits and reduce long-term cardiovascular risk.     While there are an array of popular/fad diets being promoted, I generally recommend a diet of vegetables, fruits, whole grains, and lean protein sources including low-fat dairy products, poultry, and nuts with an emphasis toward minimizing intake of sweets, carbohydrates, and red meats. Obtaining 30-40 minutes of moderate to vigorous-intensity aerobic exercise on 4-5 days weekly will assist you in reaching your optimal weight, not to mention the cardiovascular benefit, although no amount of exercise will usually overcome the effects of a poor diet. (Moderate activity means you can talk, but not sing, while you are active.) While this may seem like an insurmountable task at first to some, adding 10 minutes of aerobic activity to your current amount of exercise gradually can help you reach the above goal. The [employer] [place] [employer]'s \"Aim for a Healthy Weight\" website offers practical information on how you may improve your current diet and exercise habits. I have included the website's URL here for your reference: https://www.nhlbi.nih.gov/health/educational/lose_wt/index.htm. A " nutritionist can help support you in developing an individualized diet plan. If you are interested, I would be happy to place a referral. Just let me know.             Plan of care reviewed with patient at the conclusion of today's visit. Education was provided regarding diagnosis, management and any prescribed or recommended OTC medications.  Patient verbalizes understanding of and agreement with management plan.    Return in about 6 months (around 10/20/2021) for Medicare Wellness.    *Note that portions of this note were completed with a voice recognition program.  Efforts were made to edit the dictation but occasionally words are transcribed.    Madelyn Love, APRN

## 2021-04-28 ENCOUNTER — LAB (OUTPATIENT)
Dept: LAB | Facility: HOSPITAL | Age: 66
End: 2021-04-28

## 2021-04-28 DIAGNOSIS — R73.03 PREDIABETES: ICD-10-CM

## 2021-04-28 DIAGNOSIS — I10 ESSENTIAL HYPERTENSION: ICD-10-CM

## 2021-04-28 DIAGNOSIS — E78.2 MIXED HYPERLIPIDEMIA: ICD-10-CM

## 2021-04-28 LAB
ALBUMIN SERPL-MCNC: 4 G/DL (ref 3.5–5.2)
ALBUMIN/GLOB SERPL: 1.7 G/DL
ALP SERPL-CCNC: 44 U/L (ref 39–117)
ALT SERPL W P-5'-P-CCNC: 24 U/L (ref 1–33)
ANION GAP SERPL CALCULATED.3IONS-SCNC: 10 MMOL/L (ref 5–15)
AST SERPL-CCNC: 23 U/L (ref 1–32)
BASOPHILS # BLD AUTO: 0.06 10*3/MM3 (ref 0–0.2)
BASOPHILS NFR BLD AUTO: 1.3 % (ref 0–1.5)
BILIRUB SERPL-MCNC: 0.5 MG/DL (ref 0–1.2)
BUN SERPL-MCNC: 15 MG/DL (ref 8–23)
BUN/CREAT SERPL: 15.8 (ref 7–25)
CALCIUM SPEC-SCNC: 8.6 MG/DL (ref 8.6–10.5)
CHLORIDE SERPL-SCNC: 106 MMOL/L (ref 98–107)
CHOLEST SERPL-MCNC: 148 MG/DL (ref 0–200)
CO2 SERPL-SCNC: 29 MMOL/L (ref 22–29)
CREAT SERPL-MCNC: 0.95 MG/DL (ref 0.57–1)
DEPRECATED RDW RBC AUTO: 43.5 FL (ref 37–54)
EOSINOPHIL # BLD AUTO: 0.16 10*3/MM3 (ref 0–0.4)
EOSINOPHIL NFR BLD AUTO: 3.5 % (ref 0.3–6.2)
ERYTHROCYTE [DISTWIDTH] IN BLOOD BY AUTOMATED COUNT: 12.4 % (ref 12.3–15.4)
GFR SERPL CREATININE-BSD FRML MDRD: 58 ML/MIN/1.73
GLOBULIN UR ELPH-MCNC: 2.3 GM/DL
GLUCOSE SERPL-MCNC: 111 MG/DL (ref 65–99)
HBA1C MFR BLD: 5.71 % (ref 4.8–5.6)
HCT VFR BLD AUTO: 34.7 % (ref 34–46.6)
HDLC SERPL-MCNC: 57 MG/DL (ref 40–60)
HGB BLD-MCNC: 12.1 G/DL (ref 12–15.9)
IMM GRANULOCYTES # BLD AUTO: 0.02 10*3/MM3 (ref 0–0.05)
IMM GRANULOCYTES NFR BLD AUTO: 0.4 % (ref 0–0.5)
LDLC SERPL CALC-MCNC: 69 MG/DL (ref 0–100)
LDLC/HDLC SERPL: 1.16 {RATIO}
LYMPHOCYTES # BLD AUTO: 1.17 10*3/MM3 (ref 0.7–3.1)
LYMPHOCYTES NFR BLD AUTO: 25.9 % (ref 19.6–45.3)
MCH RBC QN AUTO: 33.6 PG (ref 26.6–33)
MCHC RBC AUTO-ENTMCNC: 34.9 G/DL (ref 31.5–35.7)
MCV RBC AUTO: 96.4 FL (ref 79–97)
MONOCYTES # BLD AUTO: 0.66 10*3/MM3 (ref 0.1–0.9)
MONOCYTES NFR BLD AUTO: 14.6 % (ref 5–12)
NEUTROPHILS NFR BLD AUTO: 2.45 10*3/MM3 (ref 1.7–7)
NEUTROPHILS NFR BLD AUTO: 54.3 % (ref 42.7–76)
NRBC BLD AUTO-RTO: 0 /100 WBC (ref 0–0.2)
PLATELET # BLD AUTO: 280 10*3/MM3 (ref 140–450)
PMV BLD AUTO: 10.3 FL (ref 6–12)
POTASSIUM SERPL-SCNC: 3.5 MMOL/L (ref 3.5–5.2)
PROT SERPL-MCNC: 6.3 G/DL (ref 6–8.5)
RBC # BLD AUTO: 3.6 10*6/MM3 (ref 3.77–5.28)
SODIUM SERPL-SCNC: 145 MMOL/L (ref 136–145)
TRIGL SERPL-MCNC: 124 MG/DL (ref 0–150)
VLDLC SERPL-MCNC: 22 MG/DL (ref 5–40)
WBC # BLD AUTO: 4.52 10*3/MM3 (ref 3.4–10.8)

## 2021-04-28 PROCEDURE — 85025 COMPLETE CBC W/AUTO DIFF WBC: CPT

## 2021-04-28 PROCEDURE — 80053 COMPREHEN METABOLIC PANEL: CPT

## 2021-04-28 PROCEDURE — 83036 HEMOGLOBIN GLYCOSYLATED A1C: CPT

## 2021-04-28 PROCEDURE — 80061 LIPID PANEL: CPT

## 2021-07-19 ENCOUNTER — OFFICE VISIT (OUTPATIENT)
Dept: CARDIOLOGY | Facility: CLINIC | Age: 66
End: 2021-07-19

## 2021-07-19 VITALS
SYSTOLIC BLOOD PRESSURE: 102 MMHG | HEART RATE: 63 BPM | DIASTOLIC BLOOD PRESSURE: 56 MMHG | HEIGHT: 68 IN | OXYGEN SATURATION: 95 % | WEIGHT: 211.2 LBS | BODY MASS INDEX: 32.01 KG/M2

## 2021-07-19 DIAGNOSIS — Z79.899 LONG TERM CURRENT USE OF ANTIARRHYTHMIC MEDICAL THERAPY: Primary | ICD-10-CM

## 2021-07-19 DIAGNOSIS — I48.19 PERSISTENT ATRIAL FIBRILLATION (HCC): ICD-10-CM

## 2021-07-19 DIAGNOSIS — I10 ESSENTIAL HYPERTENSION: ICD-10-CM

## 2021-07-19 PROCEDURE — 99214 OFFICE O/P EST MOD 30 MIN: CPT | Performed by: PHYSICIAN ASSISTANT

## 2021-07-19 PROCEDURE — 93000 ELECTROCARDIOGRAM COMPLETE: CPT | Performed by: PHYSICIAN ASSISTANT

## 2021-07-19 NOTE — PROGRESS NOTES
Stefani Severino  1955  PCP: Madelyn Love APRN    SUBJECTIVE:   Stefani Severino is a 66 y.o. female seen for a follow up visit regarding the following:     Chief Complaint: Follow up for afib, htn     HPI:    Since last visit the patient's status has been stable from a cardiac standpoint. She has not had any recurrent afib to her knowledge. No chest pain, sob, edema, palps. No CVA/TIA symptoms.     History:  This is a 65-year-old patient with a history of atrial fibrillation treated in [facility] for many years.  She was first diagnosed with atrial fibrillation in 2009.  She underwent a cardioversion at that time.  She had a follow-up cardioversion and 2014.  She was started on Multaq in 2014 has been doing well ever since.  She is very symptomatically from her atrial fibrillation.  During the A. fib events she had experiences tachycardia palpitations along with shortness of breath.        Cardiac PMH: (Old records have been reviewed and summarized below)  1.  Atrial fibrillation-diagnosed May 2009  2.  Cardioversions-May 2009, October 2014  3.  Start on Multaq therapy in October 2014  4.  Nuclear stress test-2007-negative for ischemia  5.  Hypertension  6.  Sleep apnea-on CPAP      Current Outpatient Medications:   •  amLODIPine (NORVASC) 10 MG tablet, Take 1 tablet by mouth Daily., Disp: 90 tablet, Rfl: 3  •  atorvastatin (LIPITOR) 20 MG tablet, Take 1 tablet by mouth Daily., Disp: 90 tablet, Rfl: 3  •  benazepril 20 MG tablet 20 mg, hydroCHLOROthiazide 25 MG tablet 25 mg, Take 1 tablet by mouth Daily., Disp: 90 tablet, Rfl: 3  •  cetirizine (zyrTEC) 10 MG tablet, Take 1 tablet by mouth Daily., Disp: 90 tablet, Rfl: 3  •  dronedarone (MULTAQ) 400 MG tablet, Take 1 tablet by mouth 2 (Two) Times a Day With Meals., Disp: 180 tablet, Rfl: 3  •  Erenumab-aooe (Aimovig, 140 MG Dose,) 70 MG/ML prefilled syringe, Inject 70 mg under the skin into the appropriate area as directed Every 30  (Thirty) Days., Disp: , Rfl:   •  escitalopram (LEXAPRO) 20 MG tablet, Take 1 tablet by mouth Daily., Disp: 90 tablet, Rfl: 3  •  fluticasone (FLONASE) 50 MCG/ACT nasal spray, 2 sprays into the nostril(s) as directed by provider Daily As Needed for Rhinitis or Allergies., Disp: 16 mL, Rfl: 5  •  apixaban (ELIQUIS) 5 MG tablet tablet, Take 1 tablet by mouth Every 12 (Twelve) Hours., Disp: 60 tablet, Rfl: 5    Past Medical History, Past Surgical History, Family history, Social History, and Medications were all reviewed with the patient today and updated as necessary.       Patient Active Problem List   Diagnosis   • Persistent atrial fibrillation (CMS/HCC)   • Long term current use of antiarrhythmic medical therapy   • Essential hypertension   • Generalized anxiety disorder   • BRI on CPAP   • Arthritis of right shoulder region   • Prediabetes   • Non-refractory chronic migraine without aura   • Obesity (BMI 30-39.9)   • Cholesteatoma of left ear   • Mixed hyperlipidemia     Allergies   Allergen Reactions   • Dextroamphetamine Sulfate Hallucinations   • Penicillins Unknown (See Comments)     ALLERGY AS A CHILD AND DOESN'T RECALL REACTION     Past Medical History:   Diagnosis Date   • A-fib (CMS/HCC)    • Anxiety    • Diastolic dysfunction     grade 1 with impaired relaxation   • H/O total hysterectomy with bilateral salpingo-oophorectomy (BSO)    • Headache    • Hearing loss    • History of appendectomy    • History of tonsillectomy    • History of tympanoplasty    • Hypercholesterolemia    • Hypertension    • Left atrial dilatation    • LVH (left ventricular hypertrophy)    • Obesity    • Sleep apnea    • Tinnitus      Past Surgical History:   Procedure Laterality Date   • APPENDECTOMY  1969   • HYSTERECTOMY  1984   • INNER EAR SURGERY Left 1997, 2005   • OOPHORECTOMY     • REDUCTION MAMMAPLASTY     • TONSILLECTOMY  1959     Family History   Problem Relation Age of Onset   • Dementia Mother    • Hydrocephalus Mother   "  • Ovarian cancer Mother 47   • Arthritis Mother    • Osteoporosis Mother    • Hyperthyroidism Mother    • Hypertension Mother    • Cancer Mother         female reproductive   • Anemia Mother    • Heart valve disorder Father    • Diabetes Father    • Heart disease Father         Valve issue   • Thyroid cancer Brother    • Hypertension Brother    • Hypertension Brother    • Breast cancer Neg Hx      Social History     Tobacco Use   • Smoking status: Former Smoker     Packs/day: 1.00     Years: 2.00     Pack years: 2.00     Types: Cigarettes     Start date: 1972     Quit date: 1974     Years since quittin.4   • Smokeless tobacco: Never Used   Substance Use Topics   • Alcohol use: Yes     Alcohol/week: 2.0 standard drinks     Types: 2 Glasses of wine per week     Comment: OCCASIONALLY         PHYSICAL EXAM:    /56 (BP Location: Right arm, Patient Position: Sitting)   Pulse 63   Ht 172.7 cm (68\")   Wt 95.8 kg (211 lb 3.2 oz)   SpO2 95%   BMI 32.11 kg/m²        Wt Readings from Last 5 Encounters:   21 95.8 kg (211 lb 3.2 oz)   21 97.5 kg (215 lb)   21 99.3 kg (219 lb)   20 103 kg (226 lb)   10/12/20 103 kg (226 lb 12.8 oz)       BP Readings from Last 5 Encounters:   21 102/56   21 108/56   21 122/64   20 110/68   10/12/20 126/68       General-Well Nourished, Well developed  Eyes - PERRLA  Neck- supple, No mass  CV- regular rate and rhythm, no MRG, No edema  Lung- clear bilaterally  Abd- soft, +BS  Musc/skel - Norm strength and range of motion  Skin- warm and dry  Neuro - Alert & Oriented x 3, appropriate mood.        Medical problems and test results were reviewed with the patient today.     No results found for this or any previous visit (from the past 672 hour(s)).      EKG: (EKG has been independently visualized by me and summarized below)      ECG 12 Lead    Date/Time: 2021 2:18 PM  Performed by: Cristy Cadena PA  Authorized by: Surinder, " HARRISON Fuchs   Comparison: compared with previous ECG from 12/1/2020  Similar to previous ECG  Rhythm: sinus rhythm  Rate: normal  BPM: 62  Other findings: low voltage    Clinical impression: non-specific ECG             ASSESSMENT and PLAN    1.  Atrial fibrillation-persistent in nature when it occurs.  Has required 2 cardioversions in the past. Currently doing well on Multaq. Her [id number] score is 3 and she is currently on ASA. We had a discussion today and given her elevated score, we replace ASA with Eliquis 5mg BID. Samples provided.   2.  Use of Multaq - monitor labs. EKG reviewed today and stable.   3.  HTN -  Controlled. Continue Norvasc, Benazepril-HCTZ       Return in about 6 months (around 1/19/2022).        Cristy Cadena PA-C   Cardiology/Electrophysiology  7/19/2021  14:34 EDT

## 2021-09-07 RX ORDER — BENAZEPRIL/HYDROCHLOROTHIAZIDE 20 MG-25MG
1 TABLET ORAL DAILY
Qty: 30 TABLET | Refills: 5 | Status: SHIPPED | OUTPATIENT
Start: 2021-09-07 | End: 2022-02-07

## 2022-01-24 ENCOUNTER — OFFICE VISIT (OUTPATIENT)
Dept: FAMILY MEDICINE CLINIC | Facility: CLINIC | Age: 67
End: 2022-01-24

## 2022-01-24 VITALS
HEART RATE: 73 BPM | BODY MASS INDEX: 33.49 KG/M2 | DIASTOLIC BLOOD PRESSURE: 91 MMHG | HEIGHT: 68 IN | SYSTOLIC BLOOD PRESSURE: 142 MMHG | OXYGEN SATURATION: 99 % | RESPIRATION RATE: 18 BRPM | WEIGHT: 221 LBS | TEMPERATURE: 98.3 F

## 2022-01-24 DIAGNOSIS — E66.9 OBESITY (BMI 30-39.9): ICD-10-CM

## 2022-01-24 DIAGNOSIS — Z00.00 ROUTINE MEDICAL EXAM: Primary | ICD-10-CM

## 2022-01-24 DIAGNOSIS — D36.7 DERMOID CYST OF LEG, RIGHT: ICD-10-CM

## 2022-01-24 DIAGNOSIS — Z78.0 POSTMENOPAUSAL STATUS (AGE-RELATED) (NATURAL): ICD-10-CM

## 2022-01-24 DIAGNOSIS — R73.03 PREDIABETES: ICD-10-CM

## 2022-01-24 DIAGNOSIS — Z12.31 SCREENING MAMMOGRAM FOR HIGH-RISK PATIENT: ICD-10-CM

## 2022-01-24 DIAGNOSIS — Z12.11 SPECIAL SCREENING FOR MALIGNANT NEOPLASM OF COLON: ICD-10-CM

## 2022-01-24 DIAGNOSIS — Z76.89 ENCOUNTER TO ESTABLISH CARE: ICD-10-CM

## 2022-01-24 DIAGNOSIS — F41.1 GENERALIZED ANXIETY DISORDER: ICD-10-CM

## 2022-01-24 DIAGNOSIS — E78.2 MIXED HYPERLIPIDEMIA: ICD-10-CM

## 2022-01-24 DIAGNOSIS — I10 ESSENTIAL HYPERTENSION: ICD-10-CM

## 2022-01-24 PROCEDURE — 99214 OFFICE O/P EST MOD 30 MIN: CPT | Performed by: NURSE PRACTITIONER

## 2022-01-24 RX ORDER — ASPIRIN 81 MG/1
81 TABLET, CHEWABLE ORAL DAILY
COMMUNITY
End: 2022-03-18

## 2022-01-24 NOTE — PROGRESS NOTES
Chief Complaint  Establish Care    Subjective          Stefani Severino presents to [facility] to establish care.    Hypertension  This is a chronic problem. The current episode started more than 1 year ago. The problem is unchanged. The problem is controlled. Associated symptoms include anxiety, headaches, malaise/fatigue and peripheral edema. Pertinent negatives include no blurred vision, chest pain, neck pain, orthopnea, palpitations, PND, shortness of breath or sweats. Agents associated with hypertension include NSAIDs. Risk factors for coronary artery disease include dyslipidemia, obesity, post-menopausal state and stress. Past treatments include angiotensin blockers, calcium channel blockers and diuretics. Current antihypertension treatment includes angiotensin blockers, calcium channel blockers and diuretics. The current treatment provides moderate improvement. Compliance problems include diet, exercise and psychosocial issues.  There is no history of kidney disease, heart failure or retinopathy. There is no history of chronic renal disease, a hypertension causing med, renovascular disease or a thyroid problem.   Anxiety  Presents for initial visit. Episode onset: years. The problem has been unchanged. Symptoms include decreased concentration, dizziness, dry mouth, excessive worry, irritability, nervous/anxious behavior and restlessness. Patient reports no chest pain, compulsions, confusion, depressed mood, feeling of choking, hyperventilation, impotence, insomnia, malaise, muscle tension, nausea, obsessions, palpitations, panic, shortness of breath or suicidal ideas. Symptoms occur most days. The severity of symptoms is moderate. Exacerbated by: stress. The quality of sleep is fair. Nighttime awakenings: occasional.     There are no known risk factors. Her past medical history is significant for anxiety/panic attacks and arrhythmia. There is no history of hyperthyroidism  "or suicide attempts. Past treatments include SSRIs and lifestyle changes. The treatment provided significant (Pt states anxiety controlled with Lexapro) relief. Compliance with prior treatments has been good.   Hyperlipidemia  This is a chronic problem. The current episode started more than 1 year ago. The problem is uncontrolled. Recent lipid tests were reviewed and are high. Exacerbating diseases include obesity. She has no history of chronic renal disease, diabetes, hypothyroidism, liver disease or nephrotic syndrome. Factors aggravating her hyperlipidemia include thiazides. Pertinent negatives include no chest pain, focal sensory loss, focal weakness, leg pain, myalgias or shortness of breath. Current antihyperlipidemic treatment includes statins. The current treatment provides moderate improvement of lipids. Compliance problems include adherence to diet and adherence to exercise.  Risk factors for coronary artery disease include obesity, hypertension, dyslipidemia, stress and post-menopausal.     Objective   Vital Signs:   /91   Pulse 73   Temp 98.3 °F (36.8 °C) (Temporal)   Resp 18   Ht 172.7 cm (68\")   Wt 100 kg (221 lb)   SpO2 99%   BMI 33.60 kg/m²     Physical Exam  Vitals and nursing note reviewed.   Constitutional:       General: She is awake.      Appearance: Normal appearance.   HENT:      Head: Normocephalic.      Right Ear: Hearing, tympanic membrane, ear canal and external ear normal.      Left Ear: Ear canal and external ear normal. Decreased hearing noted. Tympanic membrane is scarred.      Nose: Nose normal.      Mouth/Throat:      Lips: Pink.      Mouth: Mucous membranes are moist.      Pharynx: Oropharynx is clear.   Eyes:      General: Lids are normal.      Extraocular Movements: Extraocular movements intact.      Conjunctiva/sclera: Conjunctivae normal.      Pupils: Pupils are equal, round, and reactive to light.   Cardiovascular:      Rate and Rhythm: Normal rate and regular " rhythm.      Pulses: Normal pulses.      Heart sounds: Normal heart sounds.      Comments: Trace edema - BLE  Pulmonary:      Effort: Pulmonary effort is normal.      Breath sounds: Normal breath sounds.   Abdominal:      General: Abdomen is protuberant. Bowel sounds are normal.      Palpations: Abdomen is soft.      Tenderness: There is no abdominal tenderness.   Musculoskeletal:         General: Normal range of motion.      Cervical back: Normal range of motion.      Right lower leg: Edema present.      Left lower leg: Edema present.   Lymphadenopathy:      Cervical: No cervical adenopathy.   Skin:     General: Skin is warm and dry.      Capillary Refill: Capillary refill takes less than 2 seconds.      Comments: Scaly cyst noted to RLE   Neurological:      Mental Status: She is alert and oriented to person, place, and time.      Cranial Nerves: Cranial nerves are intact.      Sensory: Sensation is intact.      Motor: Motor function is intact.      Coordination: Coordination is intact.      Gait: Gait is intact.   Psychiatric:         Attention and Perception: Attention and perception normal.         Mood and Affect: Mood and affect normal.         Speech: Speech normal.         Behavior: Behavior normal. Behavior is cooperative.         Thought Content: Thought content normal.         Cognition and Memory: Cognition normal.         Judgment: Judgment normal.        Result Review :   The following data was reviewed by: BECKA Bueno on 01/24/2022:  CMP    CMP 3/4/21 4/28/21   Glucose 159 (A) 111 (A)   BUN 26 (A) 15   Creatinine 1.11 (A) 0.95   eGFR Non African Am 49 (A) 58 (A)   Sodium 139 145   Potassium 3.9 3.5   Chloride 100 106   Calcium 9.0 8.6   Albumin 4.20 4.00   Total Bilirubin 0.5 0.5   Alkaline Phosphatase 62 44   AST (SGOT) 17 23   ALT (SGPT) 27 24   (A) Abnormal value            CBC w/diff    CBC w/Diff 3/4/21 4/28/21   WBC 7.22 4.52   RBC 4.03 3.60 (A)   Hemoglobin 13.5 12.1   Hematocrit 39.0  34.7   MCV 96.8 96.4   MCH 33.5 (A) 33.6 (A)   MCHC 34.6 34.9   RDW 12.5 12.4   Platelets 326 280   Neutrophil Rel % 68.6 54.3   Immature Granulocyte Rel % 0.1 0.4   Lymphocyte Rel % 21.5 25.9   Monocyte Rel % 7.3 14.6 (A)   Eosinophil Rel % 1.7 3.5   Basophil Rel % 0.8 1.3   (A) Abnormal value            Lipid Panel    Lipid Panel 4/28/21   Total Cholesterol 148   Triglycerides 124   HDL Cholesterol 57   VLDL Cholesterol 22   LDL Cholesterol  69   LDL/HDL Ratio 1.16               Most Recent A1C    HGBA1C Most Recent 4/28/21   Hemoglobin A1C 5.71 (A)   (A) Abnormal value            Assessment and Plan    Diagnoses and all orders for this visit:    1. Routine medical exam (Primary)  -     POCT urinalysis dipstick, automated; Future    2. Encounter to establish care    3. Essential hypertension  -     CBC w AUTO Differential; Future  -     Comprehensive metabolic panel; Future  -     Lipid panel; Future  -     Hemoglobin A1c; Future    4. Screening mammogram for high-risk patient  -     Mammo Screening Bilateral With CAD; Future    5. Postmenopausal status (age-related) (natural)  -     DEXA Bone Density Axial; Future    6. Special screening for malignant neoplasm of colon  -     Cologuard - Stool, Per Rectum; Future    7. Mixed hyperlipidemia  -     Comprehensive metabolic panel; Future  -     Lipid panel; Future    8. Obesity (BMI 30-39.9)    9. Generalized anxiety disorder  -     TSH; Future    10. Dermoid cyst of leg, right  -     Ambulatory Referral to Dermatology    11. Prediabetes  -     Hemoglobin A1c; Future    I spent 40 minutes caring for Stefani on this date of service. This time includes time spent by me in the following activities:preparing for the visit, reviewing tests, obtaining and/or reviewing a separately obtained history, performing a medically appropriate examination and/or evaluation , counseling and educating the patient/family/caregiver, ordering medications, tests, or procedures, referring  and communicating with other health care professionals , documenting information in the medical record, independently interpreting results and communicating that information with the patient/family/caregiver and care coordination  Follow Up   Return in about 3 months (around 4/24/2022) for F/U in am for fasting labs.  Patient was given instructions and counseling regarding her condition or for health maintenance advice. Please see specific information pulled into the AVS if appropriate.       This document has been electronically signed by BECKA Bueno  January 24, 2022 12:40 EST

## 2022-01-24 NOTE — PATIENT INSTRUCTIONS
Blood Glucose Monitoring, Adult  Monitoring your blood sugar (glucose) is an important part of managing your diabetes. Blood glucose monitoring involves checking your blood glucose as often as directed and keeping a log or record of your results over time.  Checking your blood glucose regularly and keeping a blood glucose log can:  · Help you and your health care provider adjust your diabetes management plan as needed, including your medicines or insulin.  · Help you understand how food, exercise, illnesses, and medicines affect your blood glucose.  · Let you know what your blood glucose is at any time. You can quickly find out if you have low blood glucose (hypoglycemia) or high blood glucose (hyperglycemia).  Your health care provider will set individualized treatment goals for you. Your goals will be based on your age, other medical conditions you have, and how you respond to diabetes treatment. Generally, the goal of treatment is to maintain the following blood glucose levels:  · Before meals (preprandial):  mg/dL (4.4-7.2 mmol/L).  · After meals (postprandial): below 180 mg/dL (10 mmol/L).  · A1c level: less than 7%.  Supplies needed:  · Blood glucose meter.  · Test strips for your meter. Each meter has its own strips. You must use the strips that came with your meter.  · A needle to prick your finger (lancet). Do not use a lancet more than one time.  · A device that holds the lancet (lancing device).  · A journal or log book to write down your results.  How to check your blood glucose    Checking your blood glucose  1. Wash your hands for at least 20 seconds with soap and water.  2. Prick the side of your finger (not the tip) with the lancet. Use a different finger each time.  3. Gently rub the finger until a small drop of blood appears.  4. Follow instructions that come with your meter for inserting the test strip, applying blood to the strip, and using your blood glucose meter.  5. Write down your  result and any notes.  Using alternative sites  Some meters allow you to use areas of your body other than your finger (alternative sites) to test your blood. The most common alternative sites are the forearm, the thigh, and the palm of your hand.  Alternative sites may not be as accurate as the fingers because blood flow is slower in those areas. This means that the result you get may be delayed, and it may be different from the result that you would get from your finger.  Use the finger only, and do not use alternative sites, if:  · You think you have hypoglycemia.  · You sometimes do not know that your blood glucose is getting low (hypoglycemia unawareness).  General tips and recommendations  Blood glucose log    · Every time you check your blood glucose, write down your result. Also write down any notes about things that may be affecting your blood glucose, such as your diet and exercise for the day. This information can help you and your health care provider:  ? Look for patterns in your blood glucose over time.  ? Adjust your diabetes management plan as needed.  · Check if your meter allows you to download your records to a computer. Most glucose meters store a record of glucose readings in the meter.    If you have type 1 diabetes:  · Check your blood glucose 4 or more times a day if you are on intensive insulin therapy with multiple daily injections (MDI) or are using an insulin pump. Check your blood glucose:  ? Before every meal and snack.  ? Before bedtime.  · Also check your blood glucose:  ? If you have symptoms of hypoglycemia.  ? After treating a low blood glucose.  ? Before doing activities that create a risk for injury, like driving or using heavy machinery.  ? Before and after exercise.  ? 2 hours after a meal.  ? Occasionally between 2 a.m. and 3 a.m., as directed.  · You may need to check your blood glucose more often, 6-10 times per day if:  ? You have diabetes that is not well  controlled.  ? You are ill.  ? You have a history of severe hypoglycemia.  ? You have hypoglycemia unawareness.  If you have type 2 diabetes:  · If you take insulin or other diabetes medicines, check your blood glucose 2 or more times a day.  · If you are on intensive insulin therapy, check your blood glucose 4 or more times a day. Occasionally, you may also need to check your glucose between 2 a.m. and 3 a.m., as directed.  · Also check your blood glucose:  ? Before and after exercise.  ? Before doing activities that create a risk for injury, like driving or using heavy machinery.  · You may need to check your blood glucose more often if:  ? Your medicine is being adjusted.  ? Your diabetes is not well controlled.  ? You are ill.  General tips  · Make sure you always have your supplies with you.  · After you use a few boxes of test strips, adjust (calibrate) your blood glucose meter by following instructions that came with your meter.  · If you have questions or need help, all blood glucose meters have a 24-hour hotline phone number that you can call. You may also contact your health care provider.  Where to find more information  · American Diabetes Association: www.diabetes.org  Contact a health care provider if:  · Your blood glucose is at or above 240 mg/dL (13.3 mmol/L) for 2 days in a row.  · You have been sick or have had a fever for 2 days or longer, and you are not getting better.  · You have any of the following problems for more than 6 hours:  ? You cannot eat or drink.  ? You have nausea or vomiting.  ? You have diarrhea.  Get help right away if:  · Your blood glucose is lower than 54 mg/dL (3 mmol/L).  · You become confused or you have trouble thinking clearly.  · You have difficulty breathing.  · You have moderate or large ketone levels in your urine.  Summary  · Monitoring your blood sugar (glucose) is an important part of managing your diabetes.  · Blood glucose monitoring involves checking your  blood glucose as often as directed and keeping a log or record of your results over time.  · Your health care provider will set individualized treatment goals for you. Your goals will be based on your age, other medical conditions you have, and how you respond to diabetes treatment.  · Every time you check your blood glucose, write down your result. Also write down any notes about things that may be affecting your blood glucose, such as your diet and exercise for the day.  This information is not intended to replace advice given to you by your health care provider. Make sure you discuss any questions you have with your health care provider.  Document Revised: 10/30/2020 Document Reviewed: 11/02/2020  [employer] Patient Education © 2021 [employer]    BMI for Adults  What is BMI?  Body mass index (BMI) is a number that is calculated from a person's weight and height. BMI can help estimate how much of a person's weight is composed of fat. BMI does not measure body fat directly. Rather, it is an alternative to procedures that directly measure body fat, which can be difficult and expensive.  BMI can help identify people who may be at higher risk for certain medical problems.  What are BMI measurements used for?  BMI is used as a screening tool to identify possible weight problems. It helps determine whether a person is obese, overweight, a healthy weight, or underweight.  BMI is useful for:  · Identifying a weight problem that may be related to a medical condition or may increase the risk for medical problems.  · Promoting changes, such as changes in diet and exercise, to help reach a healthy weight. BMI screening can be repeated to see if these changes are working.  How is BMI calculated?  BMI involves measuring your weight in relation to your height. Both height and weight are measured, and the BMI is calculated from those numbers. This can be done either in English ([place] or metric measurements. Note that charts and  "online BMI calculators are available to help you find your BMI quickly and easily without having to do these calculations yourself.  To calculate your BMI in English ([place] measurements:    1. Measure your weight in pounds (lb).  2. Multiply the number of pounds by 703.  ? For example, for a person who weighs 180 lb, multiply that number by 703, which equals 126,540.  3. Measure your height in inches. Then multiply that number by itself to get a measurement called \"inches squared.\"  ? For example, for a person who is 70 inches tall, the \"inches squared\" measurement is 70 inches x 70 inches, which equals 4,900 inches squared.  4. Divide the total from step 2 (number of lb x 703) by the total from step 3 (inches squared): 126,540 ÷ 4,900 = 25.8. This is your BMI.    To calculate your BMI in metric measurements:  1. Measure your weight in kilograms (kg).  2. Measure your height in meters (m). Then multiply that number by itself to get a measurement called \"meters squared.\"  ? For example, for a person who is 1.75 m tall, the \"meters squared\" measurement is 1.75 m x 1.75 m, which is equal to 3.1 meters squared.  3. Divide the number of kilograms (your weight) by the meters squared number. In this example: 70 ÷ 3.1 = 22.6. This is your BMI.  What do the results mean?  BMI charts are used to identify whether you are underweight, normal weight, overweight, or obese. The following guidelines will be used:  · Underweight: BMI less than 18.5.  · Normal weight: BMI between 18.5 and 24.9.  · Overweight: BMI between 25 and 29.9.  · Obese: BMI of 30 or above.  Keep these notes in mind:  · Weight includes both fat and muscle, so someone with a muscular build, such as an athlete, may have a BMI that is higher than 24.9. In cases like these, BMI is not an accurate measure of body fat.  · To determine if excess body fat is the cause of a BMI of 25 or higher, further assessments may need to be done by a health care provider.  · BMI is " usually interpreted in the same way for men and women.  Where to find more information  For more information about BMI, including tools to quickly calculate your BMI, go to these websites:  · Centers for Disease Control and Prevention: www.cdc.gov  · American Heart Association: www.heart.org  · National Heart, Lung, and [employer] [place]: www.nhlbi.nih.gov  Summary  · Body mass index (BMI) is a number that is calculated from a person's weight and height.  · BMI may help estimate how much of a person's weight is composed of fat. BMI can help identify those who may be at higher risk for certain medical problems.  · BMI can be measured using English measurements or metric measurements.  · BMI charts are used to identify whether you are underweight, normal weight, overweight, or obese.  This information is not intended to replace advice given to you by your health care provider. Make sure you discuss any questions you have with your health care provider.  Document Revised: 09/09/2020 Document Reviewed: 07/17/2020  [employer] Patient Education © 2021 [employer]    Dyslipidemia  Dyslipidemia is an imbalance of waxy, fat-like substances (lipids) in the blood. The body needs lipids in small amounts. Dyslipidemia often involves a high level of cholesterol or triglycerides, which are types of lipids.  Common forms of dyslipidemia include:  · High levels of LDL cholesterol. LDL is the type of cholesterol that causes fatty deposits (plaques) to build up in the blood vessels that carry blood away from your heart (arteries).  · Low levels of HDL cholesterol. HDL cholesterol is the type of cholesterol that protects against heart disease. High levels of HDL remove the LDL buildup from arteries.  · High levels of triglycerides. Triglycerides are a fatty substance in the blood that is linked to a buildup of plaques in the arteries.  What are the causes?  Primary dyslipidemia is caused by changes (mutations) in genes that are passed  down through families (inherited). These mutations cause several types of dyslipidemia.  Secondary dyslipidemia is caused by lifestyle choices and diseases that lead to dyslipidemia, such as:  · Eating a diet that is high in animal fat.  · Not getting enough exercise.  · Having diabetes, kidney disease, liver disease, or thyroid disease.  · Drinking large amounts of alcohol.  · Using certain medicines.  What increases the risk?  You are more likely to develop this condition if you are an older man or if you are a woman who has gone through menopause. Other risk factors include:  · Having a family history of dyslipidemia.  · Taking certain medicines, including birth control pills, steroids, some diuretics, and beta-blockers.  · Smoking cigarettes.  · Eating a high-fat diet.  · Having certain medical conditions such as diabetes, polycystic ovary syndrome (PCOS), kidney disease, liver disease, or hypothyroidism.  · Not exercising regularly.  · Being overweight or obese with too much belly fat.  What are the signs or symptoms?  In most cases, dyslipidemia does not usually cause any symptoms.  In severe cases, very high lipid levels can cause:  · Fatty bumps under the skin (xanthomas).  · White or gray ring around the black center (pupil) of the eye.  Very high triglyceride levels can cause inflammation of the pancreas (pancreatitis).  How is this diagnosed?  Your health care provider may diagnose dyslipidemia based on a routine blood test (fasting blood test). Because most people do not have symptoms of the condition, this blood testing (lipid profile) is done on adults age 20 and older and is repeated every 5 years. This test checks:  · Total cholesterol. This measures the total amount of cholesterol in your blood, including LDL cholesterol, HDL cholesterol, and triglycerides. A healthy number is below 200.  · LDL cholesterol. The target number for LDL cholesterol is different for each person, depending on individual  risk factors. Ask your health care provider what your LDL cholesterol should be.  · HDL cholesterol. An HDL level of 60 or higher is best because it helps to protect against heart disease. A number below 40 for men or below 50 for women increases the risk for heart disease.  · Triglycerides. A healthy triglyceride number is below 150.  If your lipid profile is abnormal, your health care provider may do other blood tests.  How is this treated?  Treatment depends on the type of dyslipidemia that you have and your other risk factors for heart disease and stroke. Your health care provider will have a target range for your lipid levels based on this information.  For many people, this condition may be treated by lifestyle changes, such as diet and exercise. Your health care provider may recommend that you:  · Get regular exercise.  · Make changes to your diet.  · Quit smoking if you smoke.  If diet changes and exercise do not help you reach your goals, your health care provider may also prescribe medicine to lower lipids. The most commonly prescribed type of medicine lowers your LDL cholesterol (statin drug). If you have a high triglyceride level, your provider may prescribe another type of drug (fibrate) or an omega-3 fish oil supplement, or both.  Follow these instructions at home:    Eating and drinking  · Follow instructions from your health care provider or dietitian about eating or drinking restrictions.  · Eat a healthy diet as told by your health care provider. This can help you reach and maintain a healthy weight, lower your LDL cholesterol, and raise your HDL cholesterol. This may include:  ? Limiting your calories, if you are overweight.  ? Eating more fruits, vegetables, whole grains, fish, and lean meats.  ? Limiting saturated fat, trans fat, and cholesterol.  · If you drink alcohol:  ? Limit how much you use.  ? Be aware of how much alcohol is in your drink. In the [place] one drink equals one 12 oz bottle of  "beer (355 mL), one 5 oz glass of wine (148 mL), or one 1½ oz glass of hard liquor (44 mL).  · Do not drink alcohol if:  ? Your health care provider tells you not to drink.  ? You are pregnant, may be pregnant, or are planning to become pregnant.  Activity  · Get regular exercise. Start an exercise and strength training program as told by your health care provider. Ask your health care provider what activities are safe for you. Your health care provider may recommend:  ? 30 minutes of aerobic activity 4-6 days a week. Brisk walking is an example of aerobic activity.  ? Strength training 2 days a week.  General instructions  · Do not use any products that contain nicotine or tobacco, such as cigarettes, e-cigarettes, and chewing tobacco. If you need help quitting, ask your health care provider.  · Take over-the-counter and prescription medicines only as told by your health care provider. This includes supplements.  · Keep all follow-up visits as told by your health care provider.  Contact a health care provider if:  · You are:  ? Having trouble sticking to your exercise or diet plan.  ? Struggling to quit smoking or control your use of alcohol.  Summary  · Dyslipidemia often involves a high level of cholesterol or triglycerides, which are types of lipids.  · Treatment depends on the type of dyslipidemia that you have and your other risk factors for heart disease and stroke.  · For many people, treatment starts with lifestyle changes, such as diet and exercise.  · Your health care provider may prescribe medicine to lower lipids.  This information is not intended to replace advice given to you by your health care provider. Make sure you discuss any questions you have with your health care provider.  Document Revised: 08/12/2019 Document Reviewed: 07/19/2019  [employer] Patient Education © 2021 [employer].    Diabetes Care, 44(Suppl 1), S34-S39. [email or web address]://[email or web address]/[email or web address]\"> "   Prediabetes  Prediabetes is when your blood sugar (blood glucose) level is higher than normal but not high enough for you to be diagnosed with type 2 diabetes. Having prediabetes puts you at risk for developing type 2 diabetes (type 2 diabetes mellitus).  With certain lifestyle changes, you may be able to prevent or delay the onset of type 2 diabetes. This is important because type 2 diabetes can lead to serious complications, such as:  · Heart disease.  · Stroke.  · Blindness.  · Kidney disease.  · Depression.  · Poor circulation in the feet and legs. In severe cases, this could lead to surgical removal of a leg (amputation).  What are the causes?  The exact cause of prediabetes is not known. It may result from insulin resistance. Insulin resistance develops when cells in the body do not respond properly to insulin that the body makes. This can cause excess glucose to build up in the blood. High blood glucose (hyperglycemia) can develop.  What increases the risk?  The following factors may make you more likely to develop this condition:  · You have a family member with type 2 diabetes.  · You are older than 45 years.  · You had a temporary form of diabetes during a pregnancy (gestational diabetes).  · You had polycystic ovary syndrome (PCOS).  · You are overweight or obese.  · You are inactive (sedentary).  · You have a history of heart disease, including problems with cholesterol levels, high levels of blood fats, or high blood pressure.  What are the signs or symptoms?  You may have no symptoms. If you do have symptoms, they may include:  · Increased hunger.  · Increased thirst.  · Increased urination.  · Vision changes, such as blurry vision.  · Tiredness (fatigue).  How is this diagnosed?  This condition can be diagnosed with blood tests. Your blood glucose may be checked with one or more of the following tests:  · A fasting blood glucose (FBG) test. You will not be allowed to eat (you will fast) for at least  8 hours before a blood sample is taken.  · An A1C blood test (hemoglobin A1C). This test provides information about blood glucose levels over the previous 2?3 months.  · An oral glucose tolerance test (OGTT). This test measures your blood glucose at two points in time:  ? After fasting. This is your baseline level.  ? Two hours after you drink a beverage that contains glucose.  You may be diagnosed with prediabetes if:  · Your FBG is 100?125 mg/dL (5.6-6.9 mmol/L).  · Your A1C level is 5.7?6.4% (39-46 mmol/mol).  · Your OGTT result is 140?199 mg/dL (7.8-11 mmol/L).  These blood tests may be repeated to confirm your diagnosis.  How is this treated?  Treatment may include dietary and lifestyle changes to help lower your blood glucose and prevent type 2 diabetes from developing. In some cases, medicine may be prescribed to help lower the risk of type 2 diabetes.  Follow these instructions at home:  Nutrition    · Follow a healthy meal plan. This includes eating lean proteins, whole grains, legumes, fresh fruits and vegetables, low-fat dairy products, and healthy fats.  · Follow instructions from your health care provider about eating or drinking restrictions.  · Meet with a dietitian to create a healthy eating plan that is right for you.    Lifestyle  · Do moderate-intensity exercise for at least 30 minutes a day on 5 or more days each week, or as told by your health care provider. A mix of activities may be best, such as:  ? Brisk walking, swimming, biking, and weight lifting.  · Lose weight as told by your health care provider. Losing 5-7% of your body weight can reverse insulin resistance.  · Do not drink alcohol if:  ? Your health care provider tells you not to drink.  ? You are pregnant, may be pregnant, or are planning to become pregnant.  · If you drink alcohol:  ? Limit how much you use to:  § 0-1 drink a day for women.  § 0-2 drinks a day for men.  ? Be aware of how much alcohol is in your drink. In the [place]  one drink equals one 12 oz bottle of beer (355 mL), one 5 oz glass of wine (148 mL), or one 1½ oz glass of hard liquor (44 mL).  General instructions  · Take over-the-counter and prescription medicines only as told by your health care provider. You may be prescribed medicines that help lower the risk of type 2 diabetes.  · Do not use any products that contain nicotine or tobacco, such as cigarettes, e-cigarettes, and chewing tobacco. If you need help quitting, ask your health care provider.  · Keep all follow-up visits. This is important.  Where to find more information  · American Diabetes Association: www.diabetes.org  · Academy of Nutrition and Dietetics: www.eatright.org  · American Heart Association: www.heart.org  Contact a health care provider if:  · You have any of these symptoms:  ? Increased hunger.  ? Increased urination.  ? Increased thirst.  ? Fatigue.  ? Vision changes, such as blurry vision.  Get help right away if you:  · Have shortness of breath.  · Feel confused.  · Vomit or feel like you may vomit.  Summary  · Prediabetes is when your blood sugar (blood glucose)level is higher than normal but not high enough for you to be diagnosed with type 2 diabetes.  · Having prediabetes puts you at risk for developing type 2 diabetes (type 2 diabetes mellitus).  · Make lifestyle changes such as eating a healthy diet and exercising regularly to help prevent diabetes. Lose weight as told by your health care provider.  This information is not intended to replace advice given to you by your health care provider. Make sure you discuss any questions you have with your health care provider.  Document Revised: 03/18/2021 Document Reviewed: 03/18/2021  [employer] Patient Education © 2021 [employer]

## 2022-01-25 ENCOUNTER — LAB (OUTPATIENT)
Dept: FAMILY MEDICINE CLINIC | Facility: CLINIC | Age: 67
End: 2022-01-25

## 2022-01-25 DIAGNOSIS — E78.2 MIXED HYPERLIPIDEMIA: ICD-10-CM

## 2022-01-25 DIAGNOSIS — F41.1 GENERALIZED ANXIETY DISORDER: ICD-10-CM

## 2022-01-25 DIAGNOSIS — Z00.00 ROUTINE MEDICAL EXAM: ICD-10-CM

## 2022-01-25 DIAGNOSIS — R73.03 PREDIABETES: ICD-10-CM

## 2022-01-25 DIAGNOSIS — I10 ESSENTIAL HYPERTENSION: ICD-10-CM

## 2022-01-25 LAB
ALBUMIN SERPL-MCNC: 4.3 G/DL (ref 3.5–5.2)
ALBUMIN/GLOB SERPL: 2 G/DL
ALP SERPL-CCNC: 63 U/L (ref 39–117)
ALT SERPL W P-5'-P-CCNC: 18 U/L (ref 1–33)
ANION GAP SERPL CALCULATED.3IONS-SCNC: 10.7 MMOL/L (ref 5–15)
AST SERPL-CCNC: 20 U/L (ref 1–32)
BASOPHILS # BLD AUTO: 0.08 10*3/MM3 (ref 0–0.2)
BASOPHILS NFR BLD AUTO: 1.4 % (ref 0–1.5)
BILIRUB BLD-MCNC: NEGATIVE MG/DL
BILIRUB SERPL-MCNC: 0.4 MG/DL (ref 0–1.2)
BUN SERPL-MCNC: 15 MG/DL (ref 8–23)
BUN/CREAT SERPL: 16.7 (ref 7–25)
CALCIUM SPEC-SCNC: 8.8 MG/DL (ref 8.6–10.5)
CHLORIDE SERPL-SCNC: 100 MMOL/L (ref 98–107)
CHOLEST SERPL-MCNC: 180 MG/DL (ref 0–200)
CLARITY, POC: CLEAR
CO2 SERPL-SCNC: 29.3 MMOL/L (ref 22–29)
COLOR UR: YELLOW
CREAT SERPL-MCNC: 0.9 MG/DL (ref 0.57–1)
DEPRECATED RDW RBC AUTO: 44.9 FL (ref 37–54)
EOSINOPHIL # BLD AUTO: 0.18 10*3/MM3 (ref 0–0.4)
EOSINOPHIL NFR BLD AUTO: 3.2 % (ref 0.3–6.2)
ERYTHROCYTE [DISTWIDTH] IN BLOOD BY AUTOMATED COUNT: 12.3 % (ref 12.3–15.4)
EXPIRATION DATE: NORMAL
GFR SERPL CREATININE-BSD FRML MDRD: 62 ML/MIN/1.73
GLOBULIN UR ELPH-MCNC: 2.2 GM/DL
GLUCOSE SERPL-MCNC: 94 MG/DL (ref 65–99)
GLUCOSE UR STRIP-MCNC: NEGATIVE MG/DL
HBA1C MFR BLD: 5.9 % (ref 4.8–5.6)
HCT VFR BLD AUTO: 36.7 % (ref 34–46.6)
HDLC SERPL-MCNC: 61 MG/DL (ref 40–60)
HGB BLD-MCNC: 12.4 G/DL (ref 12–15.9)
IMM GRANULOCYTES # BLD AUTO: 0.01 10*3/MM3 (ref 0–0.05)
IMM GRANULOCYTES NFR BLD AUTO: 0.2 % (ref 0–0.5)
KETONES UR QL: NEGATIVE
LDLC SERPL CALC-MCNC: 93 MG/DL (ref 0–100)
LDLC/HDLC SERPL: 1.46 {RATIO}
LEUKOCYTE EST, POC: NEGATIVE
LYMPHOCYTES # BLD AUTO: 1.4 10*3/MM3 (ref 0.7–3.1)
LYMPHOCYTES NFR BLD AUTO: 24.5 % (ref 19.6–45.3)
Lab: NORMAL
MCH RBC QN AUTO: 33.2 PG (ref 26.6–33)
MCHC RBC AUTO-ENTMCNC: 33.8 G/DL (ref 31.5–35.7)
MCV RBC AUTO: 98.4 FL (ref 79–97)
MONOCYTES # BLD AUTO: 0.57 10*3/MM3 (ref 0.1–0.9)
MONOCYTES NFR BLD AUTO: 10 % (ref 5–12)
NEUTROPHILS NFR BLD AUTO: 3.47 10*3/MM3 (ref 1.7–7)
NEUTROPHILS NFR BLD AUTO: 60.7 % (ref 42.7–76)
NITRITE UR-MCNC: NEGATIVE MG/ML
NRBC BLD AUTO-RTO: 0 /100 WBC (ref 0–0.2)
PH UR: 7.5 [PH] (ref 5–8)
PLATELET # BLD AUTO: 249 10*3/MM3 (ref 140–450)
PMV BLD AUTO: 10.5 FL (ref 6–12)
POTASSIUM SERPL-SCNC: 3.8 MMOL/L (ref 3.5–5.2)
PROT SERPL-MCNC: 6.5 G/DL (ref 6–8.5)
PROT UR STRIP-MCNC: NEGATIVE MG/DL
RBC # BLD AUTO: 3.73 10*6/MM3 (ref 3.77–5.28)
RBC # UR STRIP: NEGATIVE /UL
SODIUM SERPL-SCNC: 140 MMOL/L (ref 136–145)
SP GR UR: 1.01 (ref 1–1.03)
TRIGL SERPL-MCNC: 149 MG/DL (ref 0–150)
TSH SERPL DL<=0.05 MIU/L-ACNC: 2.1 UIU/ML (ref 0.27–4.2)
UROBILINOGEN UR QL: NORMAL
VLDLC SERPL-MCNC: 26 MG/DL (ref 5–40)
WBC NRBC COR # BLD: 5.71 10*3/MM3 (ref 3.4–10.8)

## 2022-01-25 PROCEDURE — 81003 URINALYSIS AUTO W/O SCOPE: CPT | Performed by: NURSE PRACTITIONER

## 2022-01-25 PROCEDURE — 84443 ASSAY THYROID STIM HORMONE: CPT | Performed by: NURSE PRACTITIONER

## 2022-01-25 PROCEDURE — 83036 HEMOGLOBIN GLYCOSYLATED A1C: CPT | Performed by: NURSE PRACTITIONER

## 2022-01-25 PROCEDURE — 80061 LIPID PANEL: CPT | Performed by: NURSE PRACTITIONER

## 2022-01-25 PROCEDURE — 80053 COMPREHEN METABOLIC PANEL: CPT | Performed by: NURSE PRACTITIONER

## 2022-01-25 PROCEDURE — 85025 COMPLETE CBC W/AUTO DIFF WBC: CPT | Performed by: NURSE PRACTITIONER

## 2022-02-01 ENCOUNTER — PATIENT ROUNDING (BHMG ONLY) (OUTPATIENT)
Dept: FAMILY MEDICINE CLINIC | Facility: CLINIC | Age: 67
End: 2022-02-01

## 2022-02-01 NOTE — PROGRESS NOTES
February 1, 2022    Hello, may I speak with Stefani Severino?    My name is Shawn Roger      I am  with [facility]  [address]  [facility] [address] [phone].    Before we get started may I verify your date of birth? 1955    I am calling to officially welcome you to our practice and ask about your recent visit. Is this a good time to talk?     YES    Tell me about your visit with us. What things went well?      Patient said she moved to the area about 3.5 year ago. She has been to several providers and had not been able to find the care she is looking for. She said our staff was great and her visit was pleasant. She said Rosa Norwood took good care of her and will will return as one of Rosa's primary care patients.     We're always looking for ways to make our patients' experiences even better. Do you have recommendations on ways we may improve?      She said the check in process was a little confusing.      Overall were you satisfied with your first visit to our practice?     YES        I appreciate you taking the time to speak with me today. Is there anything else I can do for you?     NO      Thank you, and have a great day.

## 2022-02-07 RX ORDER — BENAZEPRIL/HYDROCHLOROTHIAZIDE 20 MG-25MG
TABLET ORAL
Qty: 90 TABLET | Refills: 1 | Status: SHIPPED | OUTPATIENT
Start: 2022-02-07 | End: 2022-03-18 | Stop reason: SDUPTHER

## 2022-02-23 DIAGNOSIS — Z78.0 POSTMENOPAUSAL STATUS (AGE-RELATED) (NATURAL): ICD-10-CM

## 2022-03-18 ENCOUNTER — OFFICE VISIT (OUTPATIENT)
Dept: CARDIOLOGY | Facility: CLINIC | Age: 67
End: 2022-03-18

## 2022-03-18 VITALS
BODY MASS INDEX: 29.93 KG/M2 | OXYGEN SATURATION: 96 % | SYSTOLIC BLOOD PRESSURE: 150 MMHG | DIASTOLIC BLOOD PRESSURE: 78 MMHG | HEART RATE: 80 BPM | HEIGHT: 72 IN | WEIGHT: 221 LBS

## 2022-03-18 DIAGNOSIS — I10 ESSENTIAL HYPERTENSION: ICD-10-CM

## 2022-03-18 DIAGNOSIS — I48.19 PERSISTENT ATRIAL FIBRILLATION: Primary | ICD-10-CM

## 2022-03-18 DIAGNOSIS — Z79.899 LONG TERM CURRENT USE OF ANTIARRHYTHMIC MEDICAL THERAPY: ICD-10-CM

## 2022-03-18 PROCEDURE — 99214 OFFICE O/P EST MOD 30 MIN: CPT | Performed by: STUDENT IN AN ORGANIZED HEALTH CARE EDUCATION/TRAINING PROGRAM

## 2022-03-18 PROCEDURE — 93000 ELECTROCARDIOGRAM COMPLETE: CPT | Performed by: STUDENT IN AN ORGANIZED HEALTH CARE EDUCATION/TRAINING PROGRAM

## 2022-03-18 RX ORDER — LEVOCETIRIZINE DIHYDROCHLORIDE 5 MG/1
5 TABLET, FILM COATED ORAL EVERY EVENING
COMMUNITY
End: 2023-03-01 | Stop reason: SDUPTHER

## 2022-03-18 RX ORDER — MOMETASONE FUROATE 50 UG/1
SPRAY, METERED NASAL AS NEEDED
COMMUNITY
Start: 2022-03-11

## 2022-03-18 RX ORDER — EPINEPHRINE 0.3 MG/.3ML
INJECTION SUBCUTANEOUS
COMMUNITY
Start: 2022-03-11

## 2022-03-18 RX ORDER — IPRATROPIUM BROMIDE 21 UG/1
SPRAY, METERED NASAL AS NEEDED
COMMUNITY
Start: 2022-03-11 | End: 2023-03-27

## 2022-03-18 RX ORDER — BENAZEPRIL/HYDROCHLOROTHIAZIDE 20 MG-25MG
1 TABLET ORAL DAILY
Qty: 90 TABLET | Refills: 1 | Status: SHIPPED | OUTPATIENT
Start: 2022-03-18 | End: 2023-02-10

## 2022-03-18 NOTE — PROGRESS NOTES
Cardiac Electrophysiology Outpatient Note  [facility]    Office Visit     Stefani Severino  [id number]  03/18/2022    Primary Care Physician: Rosa Norwood APRN    Referred By: No ref. provider found    Subjective     Chief Complaint   Patient presents with   • Long term current use of antiarrhythmic medical therapy   • Persistent atrial fibrillation       History of Present Illness:   Stefani Severino is a 67 y.o. female who presents to my electrophysiology clinic for follow up of persistent atrial fibrillation.  Was initially diagnosed and was fairly persistent requiring cardioversions.  She was started on Multaq and also was diagnosed with sleep apnea and started treatment with this.  Since starting treatment with sleep apnea and the Multaq she is done quite well.  She has not had symptomatic recurrence of sustained palpitations.  She had been on and off Eliquis, part of that being due to to not understanding the reason for taking it.  She never had any problems with the medication.  She denies any chest pain, dyspnea, orthopnea, PND.  She has rare lower extremity swelling after being in the car for a long time.  She will sometimes use compression stockings for this.  Past Medical History:   Diagnosis Date   • A-fib (HCC)    • Anxiety    • Diastolic dysfunction     grade 1 with impaired relaxation   • H/O total hysterectomy with bilateral salpingo-oophorectomy (BSO)    • Headache    • Hearing loss    • History of appendectomy    • History of medical problems Sleep apnea,  aFib   • History of tonsillectomy    • History of tympanoplasty    • Hypercholesterolemia    • Hypertension    • Left atrial dilatation    • LVH (left ventricular hypertrophy)    • Obesity    • Sleep apnea    • Tinnitus        Past Surgical History:   Procedure Laterality Date   • APPENDECTOMY  1969   • HYSTERECTOMY  1984   • INNER EAR SURGERY Left 1997, 2005   • OOPHORECTOMY     •  REDUCTION MAMMAPLASTY     • TONSILLECTOMY         Family History   Problem Relation Age of Onset   • Dementia Mother    • Hydrocephalus Mother    • Ovarian cancer Mother 47   • Arthritis Mother    • Osteoporosis Mother    • Hyperthyroidism Mother    • Hypertension Mother    • Cancer Mother         female reproductive   • Anemia Mother    • Hearing loss Mother    • Thyroid disease Mother    • Vision loss Mother         Glaucoma   • Heart valve disorder Father    • Diabetes Father    • Heart disease Father         Valve issue   • Thyroid cancer Brother    • Hypertension Brother    • Hearing loss Brother    • Hypertension Brother    • Cancer Brother         Thyroid   • Breast cancer Neg Hx        Social History     Socioeconomic History   • Marital status:    Tobacco Use   • Smoking status: Former Smoker     Packs/day: 1.00     Years: 2.00     Pack years: 2.00     Types: Cigarettes     Start date: 1972     Quit date: 1974     Years since quittin.1   • Smokeless tobacco: Never Used   Vaping Use   • Vaping Use: Never used   Substance and Sexual Activity   • Alcohol use: Yes     Alcohol/week: 2.0 standard drinks     Types: 2 Glasses of wine per week     Comment: OCCASIONALLY   • Drug use: No   • Sexual activity: Yes     Partners: Male     Birth control/protection: Post-menopausal         Current Outpatient Medications:   •  amLODIPine (NORVASC) 10 MG tablet, Take 1 tablet by mouth Daily., Disp: 90 tablet, Rfl: 3  •  atorvastatin (LIPITOR) 20 MG tablet, Take 1 tablet by mouth Daily., Disp: 90 tablet, Rfl: 3  •  benazepril-hydrochlorthiazide (LOTENSIN HCT) 20-25 MG per tablet, Take 1 tablet by mouth Daily., Disp: 90 tablet, Rfl: 1  •  dronedarone (MULTAQ) 400 MG tablet, Take 1 tablet by mouth 2 (Two) Times a Day With Meals., Disp: 180 tablet, Rfl: 3  •  EPINEPHrine (EPIPEN) 0.3 MG/0.3ML solution auto-injector injection, , Disp: , Rfl:   •  escitalopram (LEXAPRO) 20 MG tablet, Take 1 tablet by mouth  "Daily., Disp: 90 tablet, Rfl: 3  •  ipratropium (ATROVENT) 0.03 % nasal spray, As Needed., Disp: , Rfl:   •  levocetirizine (XYZAL) 5 MG tablet, Take 5 mg by mouth Every Evening., Disp: , Rfl:   •  mometasone (NASONEX) 50 MCG/ACT nasal spray, As Needed., Disp: , Rfl:   •  apixaban (ELIQUIS) 5 MG tablet tablet, Take 1 tablet by mouth 2 (Two) Times a Day., Disp: 60 tablet, Rfl: 3    Allergies:   Allergies   Allergen Reactions   • Dextroamphetamine Sulfate Hallucinations   • Penicillins Unknown (See Comments)     ALLERGY AS A CHILD AND DOESN'T RECALL REACTION       Objective   Vital Signs: Blood pressure 150/78, pulse 80, height 221 cm (87.01\"), weight 100 kg (221 lb), SpO2 96 %.    PHYSICAL EXAM  General appearance: Awake, alert, cooperative  Head: Normocephalic, without obvious abnormality, atraumatic  Neck: No JVD  Lungs: Clear to ascultation bilaterally  Heart: Regular rate and rhythm, no murmurs, 2+ LE pulses, no lower extremity swelling  Skin: Skin color, turgor normal, no rashes or lesions  Neurologic: Grossly normal     Lab Results   Component Value Date    GLUCOSE 94 01/25/2022    CALCIUM 8.8 01/25/2022     01/25/2022    K 3.8 01/25/2022    CO2 29.3 (H) 01/25/2022     01/25/2022    BUN 15 01/25/2022    CREATININE 0.90 01/25/2022    EGFRIFNONA 62 01/25/2022    BCR 16.7 01/25/2022    ANIONGAP 10.7 01/25/2022     Lab Results   Component Value Date    WBC 5.71 01/25/2022    HGB 12.4 01/25/2022    HCT 36.7 01/25/2022    MCV 98.4 (H) 01/25/2022     01/25/2022     No results found for: INR, PROTIME  Lab Results   Component Value Date    TSH 2.100 01/25/2022          Results for orders placed during the hospital encounter of 08/29/18    Adult Transthoracic Echo Complete W/ Cont if Necessary Per Protocol    Interpretation Summary  · Left ventricular wall thickness is consistent with concentric hypertrophy.  · Left ventricular systolic function is normal. Estimated EF = 66%.  · Left ventricular " diastolic dysfunction (grade I) consistent with impaired relaxation.  · Left atrial cavity size is mildly dilated.         I personally viewed and interpreted the patient's EKG/Telemetry/lab data      ECG 12 Lead    Date/Time: 3/18/2022 10:59 AM  Performed by: Wolfgang Conte MD  Authorized by: Wolfgang Conte MD   Comparison: compared with previous ECG from 7/19/2021  Similar to previous ECG            Stefani Severino  reports that she quit smoking about 48 years ago. Her smoking use included cigarettes. She started smoking about 50 years ago. She has a 2.00 pack-year smoking history. She has never used smokeless tobacco..        Assessment & Plan    1. Persistent atrial fibrillation (HCC)  She is a history of persistent atrial fibrillation on Multaq.  She is at symptom much improvement after starting her sleep apnea treatment.  We discussed the options going forward of continuing on Multaq versus a trial of stopping.  Overall since he is doing well she would like to continue it.  She is also only been on an aspirin and on Eliquis.  She says that she really do not understand the rationale for taking it.  We had a discussion today about the pros and cons of taking anticoagulation.  After this discussion she elected to restart the Eliquis.  We will send in a new prescription for that.    2. Long term current use of antiarrhythmic medical therapy  She is on Multaq and her EKG today looks well.  We will continue the current dose.    3. Essential hypertension  Her blood pressure is little high today in clinic but overall at home is been well controlled.  We will continue her on her benazepril, hydrochlorothiazide, and amlodipine.       Follow Up:  Return in about 1 year (around 3/18/2023).      Thank you for allowing me to participate in the care of your patient. Please do not hesitate to contact me with additional questions or concerns.      Wolfgang Conte M.D.  Cardiac Electrophysiologist  [facility] /  [facility]

## 2022-04-20 ENCOUNTER — OFFICE VISIT (OUTPATIENT)
Dept: FAMILY MEDICINE CLINIC | Facility: CLINIC | Age: 67
End: 2022-04-20

## 2022-04-20 VITALS
HEART RATE: 64 BPM | WEIGHT: 226 LBS | BODY MASS INDEX: 34.25 KG/M2 | SYSTOLIC BLOOD PRESSURE: 122 MMHG | OXYGEN SATURATION: 99 % | TEMPERATURE: 97.6 F | RESPIRATION RATE: 18 BRPM | DIASTOLIC BLOOD PRESSURE: 72 MMHG | HEIGHT: 68 IN

## 2022-04-20 DIAGNOSIS — J06.9 UPPER RESPIRATORY TRACT INFECTION, UNSPECIFIED TYPE: Primary | ICD-10-CM

## 2022-04-20 DIAGNOSIS — R05.9 COUGH: ICD-10-CM

## 2022-04-20 PROCEDURE — 96372 THER/PROPH/DIAG INJ SC/IM: CPT | Performed by: NURSE PRACTITIONER

## 2022-04-20 PROCEDURE — 99213 OFFICE O/P EST LOW 20 MIN: CPT | Performed by: NURSE PRACTITIONER

## 2022-04-20 RX ORDER — DEXAMETHASONE SODIUM PHOSPHATE 4 MG/ML
8 INJECTION, SOLUTION INTRA-ARTICULAR; INTRALESIONAL; INTRAMUSCULAR; INTRAVENOUS; SOFT TISSUE ONCE
Status: COMPLETED | OUTPATIENT
Start: 2022-04-20 | End: 2022-04-20

## 2022-04-20 RX ORDER — GUAIFENESIN 600 MG/1
600 TABLET, EXTENDED RELEASE ORAL 2 TIMES DAILY
Qty: 20 TABLET | Refills: 0 | Status: SHIPPED | OUTPATIENT
Start: 2022-04-20

## 2022-04-20 RX ORDER — AZITHROMYCIN 250 MG/1
TABLET, FILM COATED ORAL
Qty: 6 TABLET | Refills: 0 | Status: SHIPPED | OUTPATIENT
Start: 2022-04-20 | End: 2022-05-09

## 2022-04-20 RX ADMIN — DEXAMETHASONE SODIUM PHOSPHATE 8 MG: 4 INJECTION, SOLUTION INTRA-ARTICULAR; INTRALESIONAL; INTRAMUSCULAR; INTRAVENOUS; SOFT TISSUE at 14:02

## 2022-04-20 NOTE — PROGRESS NOTES
"Chief Complaint  Cough (With Congestion, Negative COVID Test at home last night. Symptoms started Sunday night but felt worse Monday.//), Generalized Body Aches, and Fever (Monday night /)    Subjective          Stefani Severino presents to [facility] for acute care (cough/congestion).    Cough  This is a new problem. The current episode started in the past 7 days. The problem has been gradually worsening. The problem occurs every few minutes. The cough is non-productive. Associated symptoms include ear congestion, headaches, nasal congestion, postnasal drip, rhinorrhea, a sore throat and wheezing. Pertinent negatives include no chest pain, chills, ear pain, fever, heartburn, hemoptysis, myalgias, rash, shortness of breath, sweats or weight loss. The symptoms are aggravated by pollens. Treatments tried: OTC cough medication.   Fever   This is a new problem. The current episode started in the past 7 days. The problem occurs intermittently. The problem has been waxing and waning. Her temperature was unmeasured prior to arrival. Associated symptoms include congestion, coughing, headaches, a sore throat and wheezing. Pertinent negatives include no abdominal pain, chest pain, diarrhea, ear pain, muscle aches, nausea, rash, sleepiness, urinary pain or vomiting.       Objective   Vital Signs:   /72   Pulse 64   Temp 97.6 °F (36.4 °C) (Temporal)   Resp 18   Ht 172.7 cm (68\")   Wt 103 kg (226 lb)   SpO2 99%   BMI 34.36 kg/m²     BMI is above normal parameters. Recommendations: educational material discussed/shared in after visit summary       Physical Exam  Vitals and nursing note reviewed.   Constitutional:       General: She is awake.      Appearance: Normal appearance.   HENT:      Head: Normocephalic.      Right Ear: Hearing, tympanic membrane and external ear normal. Swelling and tenderness present.      Left Ear: Hearing, tympanic membrane and external ear normal. " Swelling and tenderness present.      Nose: Nasal tenderness and congestion present.      Mouth/Throat:      Lips: Pink.      Mouth: Mucous membranes are moist.      Pharynx: Pharyngeal swelling and posterior oropharyngeal erythema present.   Eyes:      General: Lids are normal.      Conjunctiva/sclera: Conjunctivae normal.      Pupils: Pupils are equal, round, and reactive to light.   Cardiovascular:      Rate and Rhythm: Normal rate and regular rhythm.      Heart sounds: Normal heart sounds.   Pulmonary:      Effort: Pulmonary effort is normal.      Breath sounds: Normal breath sounds.   Abdominal:      General: Abdomen is protuberant. Bowel sounds are normal.      Palpations: Abdomen is soft.      Tenderness: There is no abdominal tenderness.   Musculoskeletal:         General: Normal range of motion.      Cervical back: Normal range of motion.   Skin:     General: Skin is warm and dry.      Capillary Refill: Capillary refill takes less than 2 seconds.   Neurological:      Mental Status: She is alert and oriented to person, place, and time.      Sensory: Sensation is intact.      Motor: Motor function is intact.      Coordination: Coordination is intact.      Gait: Gait is intact.   Psychiatric:         Attention and Perception: Attention and perception normal.         Mood and Affect: Mood and affect normal.         Speech: Speech normal.         Behavior: Behavior normal. Behavior is cooperative.         Thought Content: Thought content normal.         Cognition and Memory: Cognition normal.         Judgment: Judgment normal.          Result Review :   The following data was reviewed by: BECKA Bueno on 04/20/2022:    At home COVID Ag test - negative (-)     Assessment and Plan    Diagnoses and all orders for this visit:    1. Upper respiratory tract infection, unspecified type (Primary)  -     dexamethasone (DECADRON) injection 8 mg  -     azithromycin (Zithromax) 250 MG tablet; Take 2 tablets the first  day, then 1 tablet daily for 4 days.  Dispense: 6 tablet; Refill: 0    2. Cough  -     dexamethasone (DECADRON) injection 8 mg  -     azithromycin (Zithromax) 250 MG tablet; Take 2 tablets the first day, then 1 tablet daily for 4 days.  Dispense: 6 tablet; Refill: 0  -     guaiFENesin (Mucinex) 600 MG 12 hr tablet; Take 1 tablet by mouth 2 (Two) Times a Day.  Dispense: 20 tablet; Refill: 0    I spent 20 minutes caring for Stefani on this date of service. This time includes time spent by me in the following activities:preparing for the visit, reviewing tests, obtaining and/or reviewing a separately obtained history, performing a medically appropriate examination and/or evaluation , counseling and educating the patient/family/caregiver, ordering medications, tests, or procedures, documenting information in the medical record and independently interpreting results and communicating that information with the patient/family/caregiver     Follow Up   Return if symptoms worsen or fail to improve.  Patient was given instructions and counseling regarding her condition or for health maintenance advice. Please see specific information pulled into the AVS if appropriate.       This document has been electronically signed by BECKA Bueno  April 20, 2022 15:29 EDT

## 2022-04-25 ENCOUNTER — TELEPHONE (OUTPATIENT)
Dept: FAMILY MEDICINE CLINIC | Facility: CLINIC | Age: 67
End: 2022-04-25

## 2022-04-25 DIAGNOSIS — J06.9 UPPER RESPIRATORY TRACT INFECTION, UNSPECIFIED TYPE: Primary | ICD-10-CM

## 2022-04-25 RX ORDER — METHYLPREDNISOLONE 4 MG/1
TABLET ORAL
Qty: 21 EACH | Refills: 0 | Status: SHIPPED | OUTPATIENT
Start: 2022-04-25 | End: 2022-05-09

## 2022-04-25 NOTE — TELEPHONE ENCOUNTER
Pt states symptoms were markedly improved after steroid shot, but they started getting worse again.  Pt finished azithromycin yesterday.  I suspect viral upper respiratory infection based on symptoms.  Medrol dose pack prescribed.  Pt to follow back up in 5 days if no improvement of symptoms or worsening symptoms. Pt notified and verbalizes understanding.      This document has been electronically signed by BECKA Bueno  April 25, 2022 16:05 EDT

## 2022-05-09 ENCOUNTER — TELEPHONE (OUTPATIENT)
Dept: FAMILY MEDICINE CLINIC | Facility: CLINIC | Age: 67
End: 2022-05-09

## 2022-05-09 ENCOUNTER — OFFICE VISIT (OUTPATIENT)
Dept: FAMILY MEDICINE CLINIC | Facility: CLINIC | Age: 67
End: 2022-05-09

## 2022-05-09 VITALS
SYSTOLIC BLOOD PRESSURE: 141 MMHG | RESPIRATION RATE: 18 BRPM | OXYGEN SATURATION: 99 % | HEART RATE: 65 BPM | DIASTOLIC BLOOD PRESSURE: 66 MMHG | BODY MASS INDEX: 34.71 KG/M2 | WEIGHT: 229 LBS | HEIGHT: 68 IN | TEMPERATURE: 96.4 F

## 2022-05-09 DIAGNOSIS — E66.9 OBESITY (BMI 30-39.9): Primary | ICD-10-CM

## 2022-05-09 DIAGNOSIS — Z00.00 ENCOUNTER FOR SUBSEQUENT ANNUAL WELLNESS VISIT IN MEDICARE PATIENT: Primary | ICD-10-CM

## 2022-05-09 DIAGNOSIS — E66.9 OBESITY (BMI 30-39.9): ICD-10-CM

## 2022-05-09 PROCEDURE — 1159F MED LIST DOCD IN RCRD: CPT | Performed by: NURSE PRACTITIONER

## 2022-05-09 PROCEDURE — G0439 PPPS, SUBSEQ VISIT: HCPCS | Performed by: NURSE PRACTITIONER

## 2022-05-09 PROCEDURE — 1170F FXNL STATUS ASSESSED: CPT | Performed by: NURSE PRACTITIONER

## 2022-05-09 RX ORDER — PEN NEEDLE, DIABETIC 33 GX5/32"
1 NEEDLE, DISPOSABLE MISCELLANEOUS WEEKLY
Qty: 20 EACH | Refills: 0 | Status: SHIPPED | OUTPATIENT
Start: 2022-05-09 | End: 2023-03-17

## 2022-05-09 NOTE — TELEPHONE ENCOUNTER
Pt states she would like to initiate a medication for weight loss as she has tried several diets and exercise with no weight loss noted.  Pt requests Wegovy as she would only like to do weekly injections.  Wegovy 0.25 mg subcutaneous once weekly for 4 weeks prescribed. Pt to f/u in 4 weeks for weight check and to monitor labs.  Pt educated regarding symptoms of hypoglycemia, diet, exercise, and how to perform sub-Q injection. Pt verbalizes understanding of all instructions.      This document has been electronically signed by BECKA Bueno  May 9, 2022 19:17 EDT

## 2022-05-09 NOTE — PROGRESS NOTES
The ABCs of the Annual Wellness Visit  Subsequent Medicare Wellness Visit    Chief Complaint   Patient presents with   • Annual Exam     Medicare Annual Wellness      Subjective    History of Present Illness:  Stefani Severino is a 67 y.o. female who presents for a Subsequent Medicare Wellness Visit.    The following portions of the patient's history were reviewed and   updated as appropriate: allergies, current medications, past family history, past medical history, past social history, past surgical history and problem list.    Compared to one year ago, the patient feels her physical   health is worse.    Compared to one year ago, the patient feels her mental   health is the same.    Recent Hospitalizations:  She was not admitted to the hospital during the last year.       Current Medical Providers:  Patient Care Team:  Rosa Norwood APRN as PCP - General (Family Medicine)  Reddy Nino MD as Consulting Physician (Cardiology)  Nilsa Jackman David M, MD as Consulting Physician (Neurology)  Kortney Guaman MD as Consulting Physician (Otolaryngology)    Outpatient Medications Prior to Visit   Medication Sig Dispense Refill   • amLODIPine (NORVASC) 10 MG tablet Take 1 tablet by mouth Daily. 90 tablet 3   • apixaban (ELIQUIS) 5 MG tablet tablet Take 1 tablet by mouth 2 (Two) Times a Day. 180 tablet 3   • atorvastatin (LIPITOR) 20 MG tablet Take 1 tablet by mouth Daily. 90 tablet 3   • benazepril-hydrochlorthiazide (LOTENSIN HCT) 20-25 MG per tablet Take 1 tablet by mouth Daily. 90 tablet 1   • dronedarone (MULTAQ) 400 MG tablet Take 1 tablet by mouth 2 (Two) Times a Day With Meals. 180 tablet 2   • EPINEPHrine (EPIPEN) 0.3 MG/0.3ML solution auto-injector injection      • escitalopram (LEXAPRO) 20 MG tablet Take 1 tablet by mouth Daily. 90 tablet 3   • guaiFENesin (Mucinex) 600 MG 12 hr tablet Take 1 tablet by mouth 2 (Two) Times a Day. 20 tablet 0   • ipratropium (ATROVENT) 0.03 % nasal  spray As Needed.     • levocetirizine (XYZAL) 5 MG tablet Take 5 mg by mouth Every Evening.     • mometasone (NASONEX) 50 MCG/ACT nasal spray As Needed.     • methylPREDNISolone (MEDROL) 4 MG dose pack Take as directed on package instructions. 21 each 0   • azithromycin (Zithromax) 250 MG tablet Take 2 tablets the first day, then 1 tablet daily for 4 days. 6 tablet 0     No facility-administered medications prior to visit.       No opioid medication identified on active medication list. I have reviewed chart for other potential  high risk medication/s and harmful drug interactions in the elderly.          Aspirin is not on active medication list.  Aspirin use is contraindicated for this patient due to: current use of Eliquis.  .    Patient Active Problem List   Diagnosis   • Persistent atrial fibrillation (HCC)   • Long term current use of antiarrhythmic medical therapy   • Essential hypertension   • Generalized anxiety disorder   • BRI on CPAP   • Arthritis of right shoulder region   • Prediabetes   • Non-refractory chronic migraine without aura   • Obesity (BMI 30-39.9)   • Cholesteatoma of left ear   • Mixed hyperlipidemia     Advance Care Planning  Advance Directive is not on file.  ACP discussion was held with the patient during this visit. Patient does not have an advance directive, information provided.    Review of Systems   Constitutional: Negative.    HENT: Positive for congestion, postnasal drip and sinus pressure.         R/t seasonal allergies   Eyes: Negative.    Respiratory: Negative.    Cardiovascular: Negative.    Gastrointestinal: Negative.    Endocrine: Negative.    Genitourinary: Negative.    Musculoskeletal: Negative.    Skin: Negative.    Allergic/Immunologic: Positive for environmental allergies.   Neurological: Negative.    Hematological: Negative.    Psychiatric/Behavioral: Negative.         Objective    Vitals:    05/09/22 0918   BP: 141/66   BP Location: Left arm   Patient Position:  "Sitting   Cuff Size: Large Adult   Pulse: 65   Resp: 18   Temp: 96.4 °F (35.8 °C)   TempSrc: Temporal   SpO2: 99%   Weight: 104 kg (229 lb)   Height: 172.7 cm (68\")     BMI Readings from Last 1 Encounters:   05/09/22 34.82 kg/m²   BMI is above normal parameters. Recommendations include: educational material    Does the patient have evidence of cognitive impairment? No    Physical Exam  Vitals and nursing note reviewed.   Constitutional:       General: She is awake.      Appearance: Normal appearance.   HENT:      Head: Normocephalic.      Right Ear: Hearing, tympanic membrane, ear canal and external ear normal.      Left Ear: Hearing, tympanic membrane, ear canal and external ear normal.      Nose: Nose normal.      Mouth/Throat:      Lips: Pink.      Mouth: Mucous membranes are moist.      Pharynx: Oropharynx is clear.   Eyes:      General: Lids are normal.      Extraocular Movements: Extraocular movements intact.      Conjunctiva/sclera: Conjunctivae normal.      Pupils: Pupils are equal, round, and reactive to light.   Cardiovascular:      Rate and Rhythm: Normal rate and regular rhythm.      Pulses: Normal pulses.      Heart sounds: Normal heart sounds.   Pulmonary:      Effort: Pulmonary effort is normal.      Breath sounds: Normal breath sounds.   Abdominal:      General: Abdomen is protuberant. Bowel sounds are normal.      Palpations: Abdomen is soft.      Tenderness: There is no abdominal tenderness.   Musculoskeletal:         General: Normal range of motion.      Cervical back: Normal range of motion and neck supple.      Right lower leg: No edema.      Left lower leg: No edema.   Skin:     General: Skin is warm and dry.      Capillary Refill: Capillary refill takes less than 2 seconds.   Neurological:      Mental Status: She is alert and oriented to person, place, and time.      Cranial Nerves: Cranial nerves are intact.      Sensory: Sensation is intact.      Motor: Motor function is intact.      " Coordination: Coordination is intact.      Gait: Gait is intact.   Psychiatric:         Attention and Perception: Attention and perception normal.         Mood and Affect: Mood and affect normal.         Speech: Speech normal.         Behavior: Behavior normal. Behavior is cooperative.         Thought Content: Thought content normal.         Cognition and Memory: Cognition normal.         Judgment: Judgment normal.                 HEALTH RISK ASSESSMENT    Smoking Status:  Social History     Tobacco Use   Smoking Status Former Smoker   • Packs/day: 1.00   • Years: 2.00   • Pack years: 2.00   • Types: Cigarettes   • Start date: 1972   • Quit date: 1974   • Years since quittin.2   Smokeless Tobacco Never Used     Alcohol Consumption:  Social History     Substance and Sexual Activity   Alcohol Use Yes   • Alcohol/week: 2.0 standard drinks   • Types: 2 Glasses of wine per week    Comment: OCCASIONALLY     Fall Risk Screen:    MARY Fall Risk Assessment was completed, and patient is at LOW risk for falls.Assessment completed on:2022    Depression Screening:  PHQ-2/PHQ-9 Depression Screening 2022   Retired PHQ-9 Total Score -   Retired Total Score -   Little Interest or Pleasure in Doing Things 0-->not at all   Feeling Down, Depressed or Hopeless 0-->not at all   PHQ-9: Brief Depression Severity Measure Score 0       Health Habits and Functional and Cognitive Screening:  Functional & Cognitive Status 2022   Do you have difficulty preparing food and eating? No   Do you have difficulty bathing yourself, getting dressed or grooming yourself? No   Do you have difficulty using the toilet? No   Do you have difficulty moving around from place to place? No   Do you have trouble with steps or getting out of a bed or a chair? No   Current Diet Limited Junk Food   Dental Exam Up to date   Eye Exam Up to date   Exercise (times per week) 2 times per week   Current Exercises Include Gardening   Current  Exercise Activities Include -   Do you need help using the phone?  No   Are you deaf or do you have serious difficulty hearing?  Yes   Do you need help with transportation? No   Do you need help shopping? No   Do you need help preparing meals?  No   Do you need help with housework?  No   Do you need help with laundry? No   Do you need help taking your medications? No   Do you need help managing money? No   Do you ever drive or ride in a car without wearing a seat belt? No   Have you felt unusual stress, anger or loneliness in the last month? No   Who do you live with? Spouse   If you need help, do you have trouble finding someone available to you? No   Have you been bothered in the last four weeks by sexual problems? No   Do you have difficulty concentrating, remembering or making decisions? No       Age-appropriate Screening Schedule:  Refer to the list below for future screening recommendations based on patient's age, sex and/or medical conditions. Orders for these recommended tests are listed in the plan section. The patient has been provided with a written plan.    Health Maintenance   Topic Date Due   • INFLUENZA VACCINE  08/01/2022   • LIPID PANEL  01/25/2023   • MAMMOGRAM  02/22/2024   • DXA SCAN  02/22/2024   • TDAP/TD VACCINES (2 - Td or Tdap) 10/21/2029   • ZOSTER VACCINE  Completed              Assessment/Plan   CMS Preventative Services Quick Reference  Risk Factors Identified During Encounter  Cardiovascular Disease  Inactivity/Sedentary  Obesity/Overweight   Polypharmacy  The above risks/problems have been discussed with the patient.  Follow up actions/plans if indicated are seen below in the Assessment/Plan Section.  Pertinent information has been shared with the patient in the After Visit Summary.    Diagnoses and all orders for this visit:    1. Encounter for subsequent annual wellness visit in Medicare patient (Primary)      The preventive exam has been reviewed in detail.  The patient has been  fully counseled on preventative guidelines for vaccines, cancer screenings, and other health maintenance needs.   The patient has been counseled on guidelines for maintaining a lifestyle to promote good health and to minimize chronic diseases.  The patient has been assisted with scheduling these healthcare procedures for the coming year and given a written document of health maintenance and anticipatory guidance for age with the AVS.    Follow Up:   Return in about 4 weeks (around 6/6/2022) for Recheck weight loss/labs s/p initiation of Wegovy for weight loss.     An After Visit Summary and PPPS were made available to the patient.        I spent 30 minutes caring for Stefani on this date of service. This time includes time spent by me in the following activities:preparing for the visit, reviewing tests, obtaining and/or reviewing a separately obtained history, performing a medically appropriate examination and/or evaluation , counseling and educating the patient/family/caregiver, ordering medications, tests, or procedures, documenting information in the medical record and independently interpreting results and communicating that information with the patient/family/caregiver           This document has been electronically signed by BECKA Bueno  May 9, 2022 13:21 EDT

## 2022-05-16 ENCOUNTER — PATIENT MESSAGE (OUTPATIENT)
Dept: FAMILY MEDICINE CLINIC | Facility: CLINIC | Age: 67
End: 2022-05-16

## 2022-05-16 RX ORDER — SEMAGLUTIDE 0.25 MG/.5ML
0.25 INJECTION, SOLUTION SUBCUTANEOUS WEEKLY
Qty: 1 ML | Refills: 0 | Status: SHIPPED | OUTPATIENT
Start: 2022-05-16 | End: 2022-05-26 | Stop reason: SDUPTHER

## 2022-05-26 RX ORDER — SEMAGLUTIDE 0.25 MG/.5ML
0.25 INJECTION, SOLUTION SUBCUTANEOUS WEEKLY
Qty: 1 ML | Refills: 0 | Status: SHIPPED | OUTPATIENT
Start: 2022-05-26 | End: 2023-03-01 | Stop reason: SDUPTHER

## 2022-06-07 NOTE — TELEPHONE ENCOUNTER
Caller: Stefani Severino    Relationship: Self    Best call back number: [phone]    Requested Prescriptions:   Requested Prescriptions     Pending Prescriptions Disp Refills   • atorvastatin (LIPITOR) 20 MG tablet 90 tablet 3     Sig: Take 1 tablet by mouth Daily.   • escitalopram (LEXAPRO) 20 MG tablet 90 tablet 3     Sig: Take 1 tablet by mouth Daily.        Pharmacy where request should be sent: [facility] [address] [place] [address] [place] [address] [phone] [facility] [phone] [facility]     Additional details provided by patient:     Does the patient have less than a 3 day supply:  [x] Yes  [] No    Marcelle Nj Rep   06/07/22 16:15 EDT

## 2022-06-08 RX ORDER — ESCITALOPRAM OXALATE 20 MG/1
20 TABLET ORAL DAILY
Qty: 90 TABLET | Refills: 3 | Status: SHIPPED | OUTPATIENT
Start: 2022-06-08 | End: 2023-03-01 | Stop reason: SDUPTHER

## 2022-06-08 RX ORDER — ATORVASTATIN CALCIUM 20 MG/1
20 TABLET, FILM COATED ORAL DAILY
Qty: 90 TABLET | Refills: 3 | Status: SHIPPED | OUTPATIENT
Start: 2022-06-08 | End: 2023-03-01 | Stop reason: SDUPTHER

## 2022-06-08 NOTE — TELEPHONE ENCOUNTER
From: Stefani Severino  To: BECKA Bueno  Sent: 5/16/2022 9:39 AM EDT  Subject: Wegovy    Good morning. I just picked up needles from [facility]. How do I get the wegovy? Thanks!    Stefani Severino

## 2022-06-09 ENCOUNTER — APPOINTMENT (OUTPATIENT)
Dept: GENERAL RADIOLOGY | Facility: HOSPITAL | Age: 67
End: 2022-06-09

## 2022-06-09 ENCOUNTER — HOSPITAL ENCOUNTER (EMERGENCY)
Facility: HOSPITAL | Age: 67
Discharge: HOME | End: 2022-06-09
Attending: EMERGENCY MEDICINE | Admitting: EMERGENCY MEDICINE

## 2022-06-09 VITALS
TEMPERATURE: 98.2 F | HEIGHT: 68 IN | HEART RATE: 63 BPM | SYSTOLIC BLOOD PRESSURE: 144 MMHG | OXYGEN SATURATION: 97 % | RESPIRATION RATE: 18 BRPM | BODY MASS INDEX: 33.34 KG/M2 | WEIGHT: 220 LBS | DIASTOLIC BLOOD PRESSURE: 72 MMHG

## 2022-06-09 DIAGNOSIS — S33.5XXA LUMBAR SPRAIN, INITIAL ENCOUNTER: Primary | ICD-10-CM

## 2022-06-09 PROCEDURE — 99283 EMERGENCY DEPT VISIT LOW MDM: CPT

## 2022-06-09 PROCEDURE — 72100 X-RAY EXAM L-S SPINE 2/3 VWS: CPT

## 2022-06-09 RX ORDER — CYCLOBENZAPRINE HCL 5 MG
5 TABLET ORAL 3 TIMES DAILY PRN
Qty: 15 TABLET | Refills: 0 | Status: SHIPPED | OUTPATIENT
Start: 2022-06-09 | End: 2023-03-17

## 2022-06-09 RX ORDER — HYDROCODONE BITARTRATE AND ACETAMINOPHEN 5; 325 MG/1; MG/1
1 TABLET ORAL ONCE
Status: COMPLETED | OUTPATIENT
Start: 2022-06-09 | End: 2022-06-09

## 2022-06-09 RX ORDER — CYCLOBENZAPRINE HCL 10 MG
5 TABLET ORAL ONCE
Status: COMPLETED | OUTPATIENT
Start: 2022-06-09 | End: 2022-06-09

## 2022-06-09 RX ORDER — HYDROCODONE BITARTRATE AND ACETAMINOPHEN 5; 325 MG/1; MG/1
1 TABLET ORAL EVERY 6 HOURS PRN
Qty: 10 TABLET | Refills: 0 | Status: SHIPPED | OUTPATIENT
Start: 2022-06-09 | End: 2023-03-17

## 2022-06-09 RX ADMIN — CYCLOBENZAPRINE 5 MG: 10 TABLET, FILM COATED ORAL at 22:37

## 2022-06-09 RX ADMIN — HYDROCODONE BITARTRATE AND ACETAMINOPHEN 1 TABLET: 5; 325 TABLET ORAL at 22:37

## 2022-06-10 NOTE — ED PROVIDER NOTES
Subjective   History of Present Illness    Chief Complaint: Low back pain  History of Present Illness: 67-year-old female presents with low back pain.  Remote history of same.  States it was worse after riding roller coasters today at [employer].  No weakness no numbness no incontinence.  Chronically anticoagulated for atrial fibrillation  Onset: Today  Duration: Persistent  Exacerbating / Alleviating factors: With range of motion  Associated symptoms: None      Nurses Notes reviewed and agree, including vitals, allergies, social history and prior medical history.     REVIEW OF SYSTEMS: All systems reviewed and not pertinent unless noted.    Positive for: Low back pain    Negative for: Weakness numbness incontinence fall injury  Review of Systems    Past Medical History:   Diagnosis Date   • A-fib (HCC)    • Anxiety    • Diastolic dysfunction     grade 1 with impaired relaxation   • H/O total hysterectomy with bilateral salpingo-oophorectomy (BSO)    • Headache    • Hearing loss    • History of appendectomy    • History of medical problems Sleep apnea,  aFib   • History of tonsillectomy    • History of tympanoplasty    • Hypercholesterolemia    • Hypertension    • Left atrial dilatation    • LVH (left ventricular hypertrophy)    • Obesity    • Sleep apnea    • Tinnitus        Allergies   Allergen Reactions   • Dextroamphetamine Sulfate Hallucinations   • Penicillins Unknown (See Comments)     ALLERGY AS A CHILD AND DOESN'T RECALL REACTION       Past Surgical History:   Procedure Laterality Date   • APPENDECTOMY  1969   • HYSTERECTOMY  1984   • INNER EAR SURGERY Left 1997, 2005   • OOPHORECTOMY     • REDUCTION MAMMAPLASTY     • TONSILLECTOMY  1959       Family History   Problem Relation Age of Onset   • Dementia Mother    • Hydrocephalus Mother    • Ovarian cancer Mother 47   • Arthritis Mother    • Osteoporosis Mother    • Hyperthyroidism Mother    • Hypertension Mother    • Cancer Mother         Ovarian   • Anemia  "Mother    • Hearing loss Mother    • Thyroid disease Mother    • Vision loss Mother         Glaucoma   • Heart valve disorder Father    • Diabetes Father    • Heart disease Father         Valve issue   • Thyroid cancer Brother    • Hypertension Brother    • Hearing loss Brother    • Hypertension Brother    • Cancer Brother         Thyroid   • Breast cancer Neg Hx        Social History     Socioeconomic History   • Marital status:    Tobacco Use   • Smoking status: Former Smoker     Packs/day: 1.00     Years: 2.00     Pack years: 2.00     Types: Cigarettes     Start date: 1972     Quit date: 1974     Years since quittin.3   • Smokeless tobacco: Never Used   Vaping Use   • Vaping Use: Never used   Substance and Sexual Activity   • Alcohol use: Yes     Alcohol/week: 2.0 standard drinks     Types: 2 Glasses of wine per week     Comment: OCCASIONALLY   • Drug use: No   • Sexual activity: Yes     Partners: Male     Birth control/protection: Post-menopausal           Objective   Physical Exam  /71 (BP Location: Right arm, Patient Position: Sitting)   Pulse 63   Temp 98.2 °F (36.8 °C) (Oral)   Resp 18   Ht 172.7 cm (68\")   Wt 99.8 kg (220 lb)   SpO2 98%   BMI 33.45 kg/m²     CONSTITUTIONAL: Well developed, nontoxic 67-year-old  female,  in moderate discomfort.  VITAL SIGNS: per nursing, reviewed and noted  SKIN: exposed skin with no rashes, ulcerations or petechiae  EYES: Grossly EOMI, no icterus  ENT: Normal voice.  Patient maintained wearing a mask throughout patient encounter due to coronavirus pandemic  RESPIRATORY:  No increased work of breathing. No retractions.   CARDIOVASCULAR:  regular rate and rhythm, no murmurs.  Good Peripheral pulses. Good cap refill to extremities.   GI: Abdomen soft, nontender, normal bowel sounds. No hernia. No ascites.  MUSCULOSKELETAL: Pain limited range of motion at the low back.  Tenderness to palpation midline low back diffusely.  No " deformities.  Associated paraspinal spasm.  No saddle anesthesia no foot drop intact sensation in lower extremities at all levels.  NEUROLOGIC: Alert, oriented x 3. No gross deficits. GCS 15.   PSYCH: appropriate affect.  : no bladder tenderness or distention, no CVA tenderness      Procedures     No attending physician procedures were performed on this patient.      ED Course      Lumbar spine x-ray interpreted by me reveals no fractures no dislocation mild degenerative changes lumbosacral junction                        EMBER reviewed by Juve Phelan,              MDM  67-year-old female presents with back pain after riding roller coasters today.  She is nontoxic, neurovascularly intact.  Plain films per my interpretation without acute findings.  Added Norco and Flexeril.  I will defer anti-inflammatories due to her chronic anticoagulation history.  Short course prescriptions for same.  Outpatient follow-up with PCP, return precautions discussed.  Final diagnoses:   Lumbar sprain, initial encounter       ED Disposition  ED Disposition     ED Disposition   Discharge    Condition   Stable    Comment   --             No follow-up provider specified.       Medication List      New Prescriptions    cyclobenzaprine 5 MG tablet  Commonly known as: FLEXERIL  Take 1 tablet by mouth 3 (Three) Times a Day As Needed for Muscle Spasms.     HYDROcodone-acetaminophen 5-325 MG per tablet  Commonly known as: NORCO  Take 1 tablet by mouth Every 6 (Six) Hours As Needed for Severe Pain .           Where to Get Your Medications      Information about where to get these medications is not yet available    Ask your nurse or doctor about these medications  · cyclobenzaprine 5 MG tablet  · HYDROcodone-acetaminophen 5-325 MG per tablet          Juve Phelan DO  06/09/22 1140

## 2022-07-05 ENCOUNTER — TELEPHONE (OUTPATIENT)
Dept: FAMILY MEDICINE CLINIC | Facility: CLINIC | Age: 67
End: 2022-07-05

## 2022-07-05 DIAGNOSIS — B00.1 COLD SORE: Primary | ICD-10-CM

## 2022-07-05 NOTE — TELEPHONE ENCOUNTER
Caller: Stefani Severino    Relationship: Self    Best call back number: [phone]    What medication are you requesting: VALTREX AND DENAVIR    What are your current symptoms: PATIENT IS GETTING A FEVER BLISTER    How long have you been experiencing symptoms: TODAY    Have you had these symptoms before:    [x] Yes  [] No    Have you been treated for these symptoms before:   [x] Yes  [] No    If a prescription is needed, what is your preferred pharmacy and phone number: [id number] - [place], [place] - [address] AT [place] [address] & [facility] [place] [phone] [facility] [phone] FX     Additional notes: PATIENT HAS TAKEN THIS MEDICATION BEFORE FOR THIS PARTICULAR ISSUE. PLEASE CALL IF THERE ARE ANY QUESTIONS. SHE IS CURRENTLY AWAY FROM HOME.

## 2022-07-06 RX ORDER — VALACYCLOVIR HYDROCHLORIDE 500 MG/1
500 TABLET, FILM COATED ORAL 2 TIMES DAILY
Qty: 30 TABLET | Refills: 0 | Status: SHIPPED | OUTPATIENT
Start: 2022-07-06

## 2022-07-06 RX ORDER — PENCICLOVIR 1 %
1 CREAM (GRAM) TOPICAL
Qty: 1 EACH | Refills: 0 | Status: SHIPPED | OUTPATIENT
Start: 2022-07-06 | End: 2023-03-17

## 2022-07-06 NOTE — TELEPHONE ENCOUNTER
Pt aware of medications sent to pharmacy as requested.      This document has been electronically signed by BECKA Bueno  July 6, 2022 14:44 EDT

## 2022-07-06 NOTE — TELEPHONE ENCOUNTER
Attempted to call pt. No answer. Unable to leave a voicemail at this time.      This document has been electronically signed by BECKA Bueno  July 6, 2022 12:02 EDT

## 2022-07-06 NOTE — TELEPHONE ENCOUNTER
Attempted to call pt. No answer. Voice message left for pt to return call.      This document has been electronically signed by BECKA Bueno  July 6, 2022 13:46 EDT

## 2022-07-11 DIAGNOSIS — I10 ESSENTIAL HYPERTENSION: ICD-10-CM

## 2022-07-11 RX ORDER — AMLODIPINE BESYLATE 10 MG/1
TABLET ORAL
Qty: 90 TABLET | Refills: 3 | Status: SHIPPED | OUTPATIENT
Start: 2022-07-11 | End: 2023-03-01 | Stop reason: SDUPTHER

## 2022-08-01 NOTE — TELEPHONE ENCOUNTER
Have her double the Amlodipine. Thanks!   
Patient notified and aware.  
You saw the patient in clinic on 9/17/19. She has been having issues with her BP. She said it normally runs between 130-140/72-83. Yesterday her BP was 171/106 and last night it was 158/99. This morning it was 123/87. I asked her to check it while we were on the phone and it was 194/103 and her HR was 78. She is currently taking amlodipine 5 mg daily and benazepril-HCTZ 20/25 mg daily. Please advice.  
Detail Level: Detailed
General Sunscreen Counseling: I recommended a broad spectrum sunscreen with a SPF of 30 or higher.

## 2022-08-29 ENCOUNTER — PATIENT MESSAGE (OUTPATIENT)
Dept: FAMILY MEDICINE CLINIC | Facility: CLINIC | Age: 67
End: 2022-08-29

## 2022-08-29 DIAGNOSIS — Z99.89 OSA ON CPAP: Primary | ICD-10-CM

## 2022-08-29 DIAGNOSIS — G47.33 OSA ON CPAP: Primary | ICD-10-CM

## 2022-08-29 NOTE — TELEPHONE ENCOUNTER
From: Stefani Severino  To: BECKA Bueno  Sent: 8/29/2022 3:14 PM EDT  Subject: Referral to Dr Nani Maldonado    It is time for me to get a new prescription for CPAP supplies, and doctor needs a referral. Can you do that? Dr Maldonado is with [facility] in [place].   Thanks! tSefani

## 2022-10-21 ENCOUNTER — OFFICE VISIT (OUTPATIENT)
Dept: SLEEP MEDICINE | Facility: CLINIC | Age: 67
End: 2022-10-21

## 2022-10-21 DIAGNOSIS — G47.33 OBSTRUCTIVE SLEEP APNEA, ADULT: Primary | ICD-10-CM

## 2022-10-21 PROCEDURE — 99214 OFFICE O/P EST MOD 30 MIN: CPT | Performed by: NURSE PRACTITIONER

## 2022-10-21 NOTE — PROGRESS NOTES
Chief Complaint  Consult and need for supplies    Subjective     History of Present Illness:  Stefani Severino is a 67 y.o. femalewith a history of atrial fibrillation, diastolic dysfunction, HTN, HL, left atrial dilation, LVH, and obesity.  She was recently diagnosed with sleep apnea and initiated on PAP therapy.  Patient comes now to establish care and for refill of her supplies.  Patient states she last had a sleep study in 2009.  She typically goes to bed at 9 PM waking at 5 AM on weekdays, and 10 PM waking at 6 AM on weekends.  She has approximately 8 hours of sleep at night and it takes her approximately 15 minutes to get to sleep.  She is presently on PAP therapy.  She denies use of tobacco, drinks alcohol occasionally, and denies use of illicit drugs.  The patient drinks coffee 3 cups daily, tea less than 1, and cola less than 1.     Further details are as follows:    [place] Scale is (out of 24):       Estimated average amount of sleep per night: 8  Number of times she wakes up at night: 1  Difficulty falling back asleep: no  It usually takes 15 minutes to go to sleep.  She feels sleepy upon waking up: no  Rotating or night shift work: no    Drowsiness/Sleepiness:  She exhibits the following:  excessive daytime sleepiness  excessive daytime fatigue  Not now that she is on CPAP    Snoring/Breathing:  She exhibits the following:  loud snoring and quits breathing at night   Headaches  Now better with CPAP    Head Injury:  She exhibits the following:  No    Reflux:  She describes the following:  No    Narcolepsy:  She exhibits the following:  none    RLS/PLMs:  She describes the following:  none    Insomnia:  She describes the following:  None    Parasomnia:  She exhibits the following:  grinds teeth    Weight: There were no vitals filed for this visit.   Weight change in the last year:  gain: 20 lbs    The patient's relevant past medical, surgical, family, and social history reviewed and updated in Epic  as appropriate.    Review of Systems   Constitutional: Negative.    HENT: Positive for hearing loss, postnasal drip and tinnitus.    Eyes: Negative.    Respiratory: Negative.    Cardiovascular: Negative.    Gastrointestinal: Negative.    Endocrine: Negative.    Genitourinary: Negative.    Musculoskeletal: Positive for back pain.   Skin: Negative.    Allergic/Immunologic: Positive for environmental allergies.   Neurological: Negative.    Hematological: Negative.    Psychiatric/Behavioral: Negative.    All other systems reviewed and are negative.      PMH:    Past Medical History:   Diagnosis Date   • A-fib (HCC)    • Anxiety    • Diastolic dysfunction     grade 1 with impaired relaxation   • H/O total hysterectomy with bilateral salpingo-oophorectomy (BSO)    • Headache    • Hearing loss    • History of appendectomy    • History of medical problems Sleep apnea,  aFib   • History of tonsillectomy    • History of tympanoplasty    • Hypercholesterolemia    • Hypertension    • Left atrial dilatation    • LVH (left ventricular hypertrophy)    • Obesity    • Sleep apnea    • Tinnitus      Past Surgical History:   Procedure Laterality Date   • APPENDECTOMY     • HYSTERECTOMY     • INNER EAR SURGERY Left 2005   • OOPHORECTOMY     • REDUCTION MAMMAPLASTY     • TONSILLECTOMY       OB History        0    Para   0    Term   0       0    AB   0    Living   0       SAB   0    IAB   0    Ectopic   0    Molar   0    Multiple   0    Live Births   0              Allergies   Allergen Reactions   • Dextroamphetamine Sulfate Hallucinations   • Penicillins Unknown (See Comments)     ALLERGY AS A CHILD AND DOESN'T RECALL REACTION       MEDS:  Prior to Admission medications    Medication Sig Start Date End Date Taking? Authorizing Provider   amLODIPine (NORVASC) 10 MG tablet TAKE ONE TABLET BY MOUTH DAILY 22   Madelyn Love APRN   apixaban (ELIQUIS) 5 MG tablet tablet Take 1 tablet by mouth 2 (Two)  Times a Day. 3/23/22   Wolfgang Conte MD   atorvastatin (LIPITOR) 20 MG tablet Take 1 tablet by mouth Daily. 6/8/22   Rosa Norwood APRN   benazepril-hydrochlorthiazide (LOTENSIN HCT) 20-25 MG per tablet Take 1 tablet by mouth Daily. 3/18/22   Wolfgang Conte MD   cyclobenzaprine (FLEXERIL) 5 MG tablet Take 1 tablet by mouth 3 (Three) Times a Day As Needed for Muscle Spasms. 6/9/22   Juve Phelan DO   dronedarone (MULTAQ) 400 MG tablet Take 1 tablet by mouth 2 (Two) Times a Day With Meals. 3/24/22   Wolfgang Conte MD   EPINEPHrine (EPIPEN) 0.3 MG/0.3ML solution auto-injector injection  3/11/22   Jody Beasley MD   escitalopram (LEXAPRO) 20 MG tablet Take 1 tablet by mouth Daily. 6/8/22   Rosa Norwood APRN   guaiFENesin (Mucinex) 600 MG 12 hr tablet Take 1 tablet by mouth 2 (Two) Times a Day. 4/20/22   Rosa Norwood APRN   HYDROcodone-acetaminophen (NORCO) 5-325 MG per tablet Take 1 tablet by mouth Every 6 (Six) Hours As Needed for Severe Pain . 6/9/22   Juve Phelan DO   Insulin Pen Needle (Pen Needles) 33G X 4 MM misc 1 each 1 (One) Time Per Week. 5/9/22   Rosa Norwood APRN   ipratropium (ATROVENT) 0.03 % nasal spray As Needed. 3/11/22   Jody Beasley MD   levocetirizine (XYZAL) 5 MG tablet Take 5 mg by mouth Every Evening.    Jody Beasley MD   mometasone (NASONEX) 50 MCG/ACT nasal spray As Needed. 3/11/22   Jody Beasley MD   penciclovir (Denavir) 1 % cream Apply 1 application topically to the appropriate area as directed Every 2 (Two) Hours. 7/6/22   Rosa Norwood APRN   Semaglutide-Weight Management (Wegovy) 0.25 MG/0.5ML solution auto-injector Inject 0.25 mg under the skin into the appropriate area as directed 1 (One) Time Per Week. 5/26/22   Rosa Norwood APRN   valACYclovir (Valtrex) 500 MG tablet Take 1 tablet by mouth 2 (Two) Times a Day. 7/6/22   Rosa Norwood APRN       FH:  Family History   Problem Relation Age of Onset   • Dementia Mother    •  Hydrocephalus Mother    • Ovarian cancer Mother 47   • Arthritis Mother    • Osteoporosis Mother    • Hyperthyroidism Mother    • Hypertension Mother    • Cancer Mother         Ovarian   • Anemia Mother    • Hearing loss Mother    • Thyroid disease Mother    • Vision loss Mother         Glaucoma   • Heart valve disorder Father    • Diabetes Father    • Heart disease Father         Valve issue   • Thyroid cancer Brother    • Hypertension Brother    • Hearing loss Brother    • Hypertension Brother    • Cancer Brother         Thyroid   • Breast cancer Neg Hx        Objective   Vital Signs:  There were no vitals taken for this visit.          Physical Exam  Vitals reviewed.   Constitutional:       Appearance: Normal appearance.   HENT:      Head: Normocephalic and atraumatic.      Nose: Nose normal.      Mouth/Throat:      Mouth: Mucous membranes are moist.   Cardiovascular:      Rate and Rhythm: Normal rate and regular rhythm.      Heart sounds: No murmur heard.    No friction rub. No gallop.   Pulmonary:      Effort: Pulmonary effort is normal. No respiratory distress.      Breath sounds: Normal breath sounds. No wheezing or rhonchi.   Neurological:      Mental Status: She is alert and oriented to person, place, and time.   Psychiatric:         Behavior: Behavior normal.           Result Review :           CPAP Reportt:  AHI-2.8/H  Total days used: 87/90 (96.7%)  Days use greater than 4 hours: 91%  Average time days used: 7 hours 48 minutes  Settings: CPAP-C-Flex: CPAP pressure 9 cm H2O, C-Flex setting-3, ramp type-linear, ramp time 20 minutes, ramp start pressure 4.5 cm H2O.     Assessment and Plan  This is a pleasant 67-year-old female who presents to establish care for obstructive sleep apnea on CPAP, and requires order for new supplies.  She has a past medical history significant for atrial fibrillation, diastolic dysfunction, left ventricular hypertrophy, and left atrial dilation.  She had required cardioversion.   Download obtained shows excellent adherence to CPAP usage with greater than 4-hour at 91%, and AHI-2.8/H.  I will refill patient's supplies and patient return for follow-up compliance in 1 year or sooner should she have further questions or concerns.    Diagnoses and all orders for this visit:    1. Obstructive sleep apnea, adult (Primary)  -     PAP Therapy                 I discussed the consequences of uncontrolled sleep apnea including hypertension, heart disease, diabetes, stroke, and dementia. I further discussed sleep apnea therapeutic options including CPAP, Weight loss, Oral dental appliance, and surgery.    Follow Up  Return in about 1 year (around 10/21/2023).  Patient was given instructions and counseling regarding her condition or for health maintenance advice. Please see specific information pulled into the AVS if appropriate.     BECKA Esparza, [facility]-BC  Pulmonary, Critical Care Medicine, and Sleep Medicine  Electronically signed by BECKA Stokes, 10/21/22, 7:40 AM EDT.

## 2023-01-25 ENCOUNTER — TELEPHONE (OUTPATIENT)
Dept: FAMILY MEDICINE CLINIC | Facility: CLINIC | Age: 68
End: 2023-01-25
Payer: MEDICARE

## 2023-01-25 NOTE — TELEPHONE ENCOUNTER
Returned patient phone call on appointment request, patient did not want to schedule an appointment, she said she figured out what was going on.

## 2023-02-10 RX ORDER — BENAZEPRIL/HYDROCHLOROTHIAZIDE 20 MG-25MG
TABLET ORAL
Qty: 90 TABLET | Refills: 3 | Status: SHIPPED | OUTPATIENT
Start: 2023-02-10 | End: 2023-03-01 | Stop reason: SDUPTHER

## 2023-03-01 ENCOUNTER — OFFICE VISIT (OUTPATIENT)
Dept: FAMILY MEDICINE CLINIC | Facility: CLINIC | Age: 68
End: 2023-03-01
Payer: MEDICARE

## 2023-03-01 VITALS
HEIGHT: 68 IN | HEART RATE: 75 BPM | OXYGEN SATURATION: 96 % | WEIGHT: 227.4 LBS | SYSTOLIC BLOOD PRESSURE: 120 MMHG | DIASTOLIC BLOOD PRESSURE: 70 MMHG | RESPIRATION RATE: 18 BRPM | BODY MASS INDEX: 34.46 KG/M2 | TEMPERATURE: 97.1 F

## 2023-03-01 DIAGNOSIS — I10 ESSENTIAL HYPERTENSION: Primary | ICD-10-CM

## 2023-03-01 DIAGNOSIS — J30.89 ENVIRONMENTAL AND SEASONAL ALLERGIES: ICD-10-CM

## 2023-03-01 DIAGNOSIS — F41.9 ANXIETY: ICD-10-CM

## 2023-03-01 DIAGNOSIS — E66.9 OBESITY (BMI 30-39.9): ICD-10-CM

## 2023-03-01 DIAGNOSIS — Z12.11 SCREENING FOR COLON CANCER: ICD-10-CM

## 2023-03-01 DIAGNOSIS — R73.03 PREDIABETES: ICD-10-CM

## 2023-03-01 DIAGNOSIS — I10 ESSENTIAL HYPERTENSION: ICD-10-CM

## 2023-03-01 DIAGNOSIS — E78.2 MIXED HYPERLIPIDEMIA: ICD-10-CM

## 2023-03-01 LAB
BASOPHILS # BLD AUTO: 0.06 10*3/MM3 (ref 0–0.2)
BASOPHILS NFR BLD AUTO: 1.1 % (ref 0–1.5)
DEPRECATED RDW RBC AUTO: 47.7 FL (ref 37–54)
EOSINOPHIL # BLD AUTO: 0.17 10*3/MM3 (ref 0–0.4)
EOSINOPHIL NFR BLD AUTO: 3.2 % (ref 0.3–6.2)
ERYTHROCYTE [DISTWIDTH] IN BLOOD BY AUTOMATED COUNT: 12.9 % (ref 12.3–15.4)
HBA1C MFR BLD: 5.9 % (ref 4.8–5.6)
HCT VFR BLD AUTO: 38.3 % (ref 34–46.6)
HGB BLD-MCNC: 12.8 G/DL (ref 12–15.9)
IMM GRANULOCYTES # BLD AUTO: 0.02 10*3/MM3 (ref 0–0.05)
IMM GRANULOCYTES NFR BLD AUTO: 0.4 % (ref 0–0.5)
LYMPHOCYTES # BLD AUTO: 1.29 10*3/MM3 (ref 0.7–3.1)
LYMPHOCYTES NFR BLD AUTO: 24.2 % (ref 19.6–45.3)
MCH RBC QN AUTO: 33.9 PG (ref 26.6–33)
MCHC RBC AUTO-ENTMCNC: 33.4 G/DL (ref 31.5–35.7)
MCV RBC AUTO: 101.3 FL (ref 79–97)
MONOCYTES # BLD AUTO: 0.64 10*3/MM3 (ref 0.1–0.9)
MONOCYTES NFR BLD AUTO: 12 % (ref 5–12)
NEUTROPHILS NFR BLD AUTO: 3.15 10*3/MM3 (ref 1.7–7)
NEUTROPHILS NFR BLD AUTO: 59.1 % (ref 42.7–76)
NRBC BLD AUTO-RTO: 0 /100 WBC (ref 0–0.2)
PLATELET # BLD AUTO: 314 10*3/MM3 (ref 140–450)
PMV BLD AUTO: 10 FL (ref 6–12)
RBC # BLD AUTO: 3.78 10*6/MM3 (ref 3.77–5.28)
WBC NRBC COR # BLD: 5.33 10*3/MM3 (ref 3.4–10.8)

## 2023-03-01 PROCEDURE — 83036 HEMOGLOBIN GLYCOSYLATED A1C: CPT | Performed by: NURSE PRACTITIONER

## 2023-03-01 PROCEDURE — 80053 COMPREHEN METABOLIC PANEL: CPT | Performed by: NURSE PRACTITIONER

## 2023-03-01 PROCEDURE — 99214 OFFICE O/P EST MOD 30 MIN: CPT | Performed by: NURSE PRACTITIONER

## 2023-03-01 PROCEDURE — 85025 COMPLETE CBC W/AUTO DIFF WBC: CPT | Performed by: NURSE PRACTITIONER

## 2023-03-01 PROCEDURE — 84443 ASSAY THYROID STIM HORMONE: CPT | Performed by: NURSE PRACTITIONER

## 2023-03-01 PROCEDURE — 80061 LIPID PANEL: CPT | Performed by: NURSE PRACTITIONER

## 2023-03-01 RX ORDER — SEMAGLUTIDE 0.25 MG/.5ML
0.25 INJECTION, SOLUTION SUBCUTANEOUS WEEKLY
Qty: 2 ML | Refills: 0 | Status: SHIPPED | OUTPATIENT
Start: 2023-03-01 | End: 2023-03-23

## 2023-03-01 RX ORDER — BENAZEPRIL/HYDROCHLOROTHIAZIDE 20 MG-25MG
1 TABLET ORAL DAILY
Qty: 90 TABLET | Refills: 3 | Status: SHIPPED | OUTPATIENT
Start: 2023-03-01

## 2023-03-01 RX ORDER — ATORVASTATIN CALCIUM 20 MG/1
20 TABLET, FILM COATED ORAL DAILY
Qty: 90 TABLET | Refills: 3 | Status: SHIPPED | OUTPATIENT
Start: 2023-03-01

## 2023-03-01 RX ORDER — AMLODIPINE BESYLATE 10 MG/1
10 TABLET ORAL DAILY
Qty: 90 TABLET | Refills: 3 | Status: SHIPPED | OUTPATIENT
Start: 2023-03-01

## 2023-03-01 RX ORDER — IPRATROPIUM BROMIDE 21 UG/1
2 SPRAY, METERED NASAL EVERY 12 HOURS SCHEDULED
COMMUNITY

## 2023-03-01 RX ORDER — ESCITALOPRAM OXALATE 20 MG/1
20 TABLET ORAL DAILY
Qty: 90 TABLET | Refills: 3 | Status: SHIPPED | OUTPATIENT
Start: 2023-03-01

## 2023-03-01 RX ORDER — LEVOCETIRIZINE DIHYDROCHLORIDE 5 MG/1
5 TABLET, FILM COATED ORAL EVERY EVENING
Qty: 90 TABLET | Refills: 1 | Status: SHIPPED | OUTPATIENT
Start: 2023-03-01

## 2023-03-01 NOTE — PROGRESS NOTES
Chief Complaint  Follow-up (Continued care of htn, hyperlipidemia, anxiety, obesity)    Subjective        Stefani Severino presents to [facility] for follow up (hypertension; hyperlipidemia; anxiety; obesity).    Hypertension  This is a chronic problem. The current episode started more than 1 year ago. Associated symptoms include anxiety and malaise/fatigue. Pertinent negatives include no blurred vision, chest pain, headaches, neck pain, orthopnea, palpitations, peripheral edema, PND, shortness of breath or sweats. Agents associated with hypertension include NSAIDs. Risk factors for coronary artery disease include dyslipidemia, obesity, post-menopausal state, sedentary lifestyle and stress. Past treatments include calcium channel blockers, diuretics and angiotensin blockers. Current antihypertension treatment includes calcium channel blockers, diuretics and angiotensin blockers. The current treatment provides significant improvement. Compliance problems include diet, exercise and psychosocial issues.  There is no history of chronic renal disease.   Hyperlipidemia  This is a chronic problem. The current episode started more than 1 year ago. The problem is uncontrolled. Recent lipid tests were reviewed and are high. Exacerbating diseases include obesity. She has no history of chronic renal disease, diabetes, hypothyroidism, liver disease or nephrotic syndrome. Pertinent negatives include no chest pain, focal sensory loss, focal weakness, leg pain, myalgias or shortness of breath. Current antihyperlipidemic treatment includes statins. The current treatment provides moderate improvement of lipids. Compliance problems include adherence to diet, adherence to exercise and psychosocial issues.  Risk factors for coronary artery disease include dyslipidemia, obesity, hypertension, stress, a sedentary lifestyle and post-menopausal.   Anxiety  Presents for follow-up visit. Symptoms include  "decreased concentration, dizziness, dry mouth, excessive worry, nervous/anxious behavior and restlessness. Patient reports no chest pain, compulsions, confusion, depressed mood, feeling of choking, hyperventilation, impotence, insomnia, irritability, malaise, muscle tension, nausea, obsessions, palpitations, panic, shortness of breath or suicidal ideas. Symptoms occur occasionally. The severity of symptoms is mild (significant improvement on Lexapro). The quality of sleep is fair. Nighttime awakenings: occasional.     Compliance with medications is %.   Obesity  This is a chronic problem. The current episode started more than 1 year ago. The problem has been gradually worsening. Associated symptoms include arthralgias, fatigue and joint swelling. Pertinent negatives include no abdominal pain, anorexia, change in bowel habit, chest pain, chills, congestion, coughing, diaphoresis, fever, headaches, myalgias, nausea, neck pain, numbness, rash, sore throat, swollen glands, urinary symptoms, vertigo, visual change, vomiting or weakness. The symptoms are aggravated by stress. Treatments tried: diet; exercise; calorie restriction; Adipex. The treatment provided no relief.     Objective   Vital Signs:  /70 (BP Location: Left arm, Patient Position: Sitting, Cuff Size: Adult)   Pulse 75   Temp 97.1 °F (36.2 °C) (Temporal)   Resp 18   Ht 172.7 cm (68\")   Wt 103 kg (227 lb 6.4 oz)   SpO2 96%   BMI 34.58 kg/m²   Estimated body mass index is 34.58 kg/m² as calculated from the following:    Height as of this encounter: 172.7 cm (68\").    Weight as of this encounter: 103 kg (227 lb 6.4 oz).       BMI is >= 30 and <35. (Class 1 Obesity). The following options were offered after discussion;: weight loss educational material (shared in after visit summary)    Physical Exam  Vitals and nursing note reviewed. Exam conducted with a chaperone present.   Constitutional:       General: She is awake.      Appearance: " Normal appearance. She is obese.   HENT:      Head: Normocephalic.      Right Ear: Hearing, tympanic membrane, ear canal and external ear normal.      Left Ear: Hearing, tympanic membrane, ear canal and external ear normal.      Nose: Nose normal.      Mouth/Throat:      Lips: Pink.      Mouth: Mucous membranes are moist.      Pharynx: Oropharynx is clear.   Eyes:      General: Lids are normal.      Extraocular Movements: Extraocular movements intact.      Conjunctiva/sclera: Conjunctivae normal.      Pupils: Pupils are equal, round, and reactive to light.   Cardiovascular:      Rate and Rhythm: Normal rate and regular rhythm.      Pulses: Normal pulses.      Heart sounds: Normal heart sounds.   Pulmonary:      Effort: Pulmonary effort is normal.      Breath sounds: Normal breath sounds.   Abdominal:      General: Abdomen is protuberant. Bowel sounds are normal.      Palpations: Abdomen is soft.      Tenderness: There is no abdominal tenderness.   Musculoskeletal:         General: Normal range of motion.      Cervical back: Normal range of motion and neck supple.      Right lower leg: No edema.      Left lower leg: No edema.   Skin:     General: Skin is warm and dry.      Capillary Refill: Capillary refill takes less than 2 seconds.   Neurological:      Mental Status: She is alert and oriented to person, place, and time.      Cranial Nerves: Cranial nerves 2-12 are intact.      Sensory: Sensation is intact.      Motor: Motor function is intact.      Coordination: Coordination is intact.      Gait: Gait is intact.      Deep Tendon Reflexes: Reflexes are normal and symmetric.   Psychiatric:         Attention and Perception: Attention and perception normal.         Mood and Affect: Affect normal. Mood is anxious.         Speech: Speech is rapid and pressured.         Behavior: Behavior normal. Behavior is cooperative.         Thought Content: Thought content normal.         Cognition and Memory: Cognition normal.          Judgment: Judgment normal.        Result Review :  The following data was reviewed by: BECKA Bueno on 03/01/2023:              Assessment and Plan   Diagnoses and all orders for this visit:    1. Essential hypertension (Primary)  Comments:  Continue current meds, follow-up with Dr. Nino  Orders:  -     CBC w AUTO Differential; Future  -     Comprehensive metabolic panel; Future  -     Lipid panel; Future  -     TSH; Future  -     amLODIPine (NORVASC) 10 MG tablet; Take 1 tablet by mouth Daily.  Dispense: 90 tablet; Refill: 3  -     benazepril-hydrochlorthiazide (LOTENSIN HCT) 20-25 MG per tablet; Take 1 tablet by mouth Daily.  Dispense: 90 tablet; Refill: 3    2. Mixed hyperlipidemia  -     Lipid panel; Future  -     atorvastatin (LIPITOR) 20 MG tablet; Take 1 tablet by mouth Daily.  Dispense: 90 tablet; Refill: 3    3. Prediabetes  -     Comprehensive metabolic panel; Future  -     Hemoglobin A1c; Future    4. Anxiety  -     TSH; Future  -     escitalopram (LEXAPRO) 20 MG tablet; Take 1 tablet by mouth Daily.  Dispense: 90 tablet; Refill: 3    5. Environmental and seasonal allergies  -     levocetirizine (XYZAL) 5 MG tablet; Take 1 tablet by mouth Every Evening.  Dispense: 90 tablet; Refill: 1    6. Screening for colon cancer  -     Ambulatory Referral to Gastroenterology    7. Obesity (BMI 30-39.9)  -     Semaglutide-Weight Management (Wegovy) 0.25 MG/0.5ML solution auto-injector; Inject 0.25 mg under the skin into the appropriate area as directed 1 (One) Time Per Week.  Dispense: 2 mL; Refill: 0         I spent 25 minutes caring for Stefani on this date of service. This time includes time spent by me in the following activities:preparing for the visit, reviewing tests, obtaining and/or reviewing a separately obtained history, performing a medically appropriate examination and/or evaluation , counseling and educating the patient/family/caregiver, ordering medications, tests, or procedures, referring  and communicating with other health care professionals , documenting information in the medical record, independently interpreting results and communicating that information with the patient/family/caregiver and care coordination     Follow Up   Return in about 3 months (around 6/1/2023).  Patient was given instructions and counseling regarding her condition or for health maintenance advice. Please see specific information pulled into the AVS if appropriate.       This document has been electronically signed by BECKA Bueno  March 1, 2023 12:56 EST

## 2023-03-01 NOTE — PATIENT INSTRUCTIONS
Gastroesophageal Reflux Disease, Adult  Gastroesophageal reflux (CARITO) happens when acid from the stomach flows up into the tube that connects the mouth and the stomach (esophagus). Normally, food travels down the esophagus and stays in the stomach to be digested. With CARITO, food and stomach acid sometimes move back up into the esophagus.  You may have a disease called gastroesophageal reflux disease (GERD) if the reflux:  Happens often.  Causes frequent or very bad symptoms.  Causes problems such as damage to the esophagus.  When this happens, the esophagus becomes sore and swollen. Over time, GERD can make small holes (ulcers) in the lining of the esophagus.  What are the causes?  This condition is caused by a problem with the muscle between the esophagus and the stomach. When this muscle is weak or not normal, it does not close properly to keep food and acid from coming back up from the stomach.  The muscle can be weak because of:  Tobacco use.  Pregnancy.  Having a certain type of hernia (hiatal hernia).  Alcohol use.  Certain foods and drinks, such as coffee, chocolate, onions, and peppermint.  What increases the risk?  Being overweight.  Having a disease that affects your connective tissue.  Taking NSAIDs, such a ibuprofen.  What are the signs or symptoms?  Heartburn.  Difficult or painful swallowing.  The feeling of having a lump in the throat.  A bitter taste in the mouth.  Bad breath.  Having a lot of saliva.  Having an upset or bloated stomach.  Burping.  Chest pain. Different conditions can cause chest pain. Make sure you see your doctor if you have chest pain.  Shortness of breath or wheezing.  A long-term cough or a cough at night.  Wearing away of the surface of teeth (tooth enamel).  Weight loss.  How is this treated?  Making changes to your diet.  Taking medicine.  Having surgery.  Treatment will depend on how bad your symptoms are.  Follow these instructions at home:  Eating and drinking    Follow a  diet as told by your doctor. You may need to avoid foods and drinks such as:  Coffee and tea, with or without caffeine.  Drinks that contain alcohol.  Energy drinks and sports drinks.  Bubbly (carbonated) drinks or sodas.  Chocolate and cocoa.  Peppermint and mint flavorings.  Garlic and onions.  Horseradish.  Spicy and acidic foods. These include peppers, chili powder, murphy powder, vinegar, hot sauces, and BBQ sauce.  Citrus fruit juices and citrus fruits, such as oranges, keisha, and limes.  Tomato-based foods. These include red sauce, chili, salsa, and pizza with red sauce.  Fried and fatty foods. These include donuts, french fries, potato chips, and high-fat dressings.  High-fat meats. These include hot dogs, rib eye steak, sausage, ham, and rouse.  High-fat dairy items, such as whole milk, butter, and cream cheese.  Eat small meals often. Avoid eating large meals.  Avoid drinking large amounts of liquid with your meals.  Avoid eating meals during the 2-3 hours before bedtime.  Avoid lying down right after you eat.  Do not exercise right after you eat.  Lifestyle    Do not smoke or use any products that contain nicotine or tobacco. If you need help quitting, ask your doctor.  Try to lower your stress. If you need help doing this, ask your doctor.  If you are overweight, lose an amount of weight that is healthy for you. Ask your doctor about a safe weight loss goal.  General instructions  Pay attention to any changes in your symptoms.  Take over-the-counter and prescription medicines only as told by your doctor.  Do not take aspirin, ibuprofen, or other NSAIDs unless your doctor says it is okay.  Wear loose clothes. Do not wear anything tight around your waist.  Raise (elevate) the head of your bed about 6 inches (15 cm). You may need to use a wedge to do this.  Avoid bending over if this makes your symptoms worse.  Keep all follow-up visits.  Contact a doctor if:  You have new symptoms.  You lose weight and you  do not know why.  You have trouble swallowing or it hurts to swallow.  You have wheezing or a cough that keeps happening.  You have a hoarse voice.  Your symptoms do not get better with treatment.  Get help right away if:  You have sudden pain in your arms, neck, jaw, teeth, or back.  You suddenly feel sweaty, dizzy, or light-headed.  You have chest pain or shortness of breath.  You vomit and the vomit is green, yellow, or black, or it looks like blood or coffee grounds.  You faint.  Your poop (stool) is red, bloody, or black.  You cannot swallow, drink, or eat.  These symptoms may represent a serious problem that is an emergency. Do not wait to see if the symptoms will go away. Get medical help right away. Call your local emergency services (911 in the [place] Do not drive yourself to the hospital.  Summary  If a person has gastroesophageal reflux disease (GERD), food and stomach acid move back up into the esophagus and cause symptoms or problems such as damage to the esophagus.  Treatment will depend on how bad your symptoms are.  Follow a diet as told by your doctor.  Take all medicines only as told by your doctor.  This information is not intended to replace advice given to you by your health care provider. Make sure you discuss any questions you have with your health care provider.  Document Revised: 06/28/2021 Document Reviewed: 06/28/2021  [employer] Patient Education © 2022 [employer] Generalized Anxiety Disorder, Adult  Generalized anxiety disorder (SHARDA) is a mental health condition. Unlike normal worries, anxiety related to SHARDA is not triggered by a specific event. These worries do not fade or get better with time. SHARDA interferes with relationships, work, and school.  SHARDA symptoms can vary from mild to severe. People with severe SHARDA can have intense waves of anxiety with physical symptoms that are similar to panic attacks.  What are the causes?  The exact cause of SHARDA is not known, but the following are  believed to have an impact:  Differences in natural brain chemicals.  Genes passed down from parents to children.  Differences in the way threats are perceived.  Development and stress during childhood.  Personality.  What increases the risk?  The following factors may make you more likely to develop this condition:  Being female.  Having a family history of anxiety disorders.  Being very shy.  Experiencing very stressful life events, such as the death of a loved one.  Having a very stressful family environment.  What are the signs or symptoms?  People with SHARDA often worry excessively about many things in their lives, such as their health and family. Symptoms may also include:  Mental and emotional symptoms:  Worrying excessively about natural disasters.  Fear of being late.  Difficulty concentrating.  Fears that others are judging your performance.  Physical symptoms:  Fatigue.  Headaches, muscle tension, muscle twitches, trembling, or feeling shaky.  Feeling like your heart is pounding or beating very fast.  Feeling out of breath or like you cannot take a deep breath.  Having trouble falling asleep or staying asleep, or experiencing restlessness.  Sweating.  Nausea, diarrhea, or irritable bowel syndrome (IBS).  Behavioral symptoms:  Experiencing erratic moods or irritability.  Avoidance of new situations.  Avoidance of people.  Extreme difficulty making decisions.  How is this diagnosed?  This condition is diagnosed based on your symptoms and medical history. You will also have a physical exam. Your health care provider may perform tests to rule out other possible causes of your symptoms.  To be diagnosed with SHARDA, a person must have anxiety that:  Is out of his or her control.  Affects several different aspects of his or her life, such as work and relationships.  Causes distress that makes him or her unable to take part in normal activities.  Includes at least three symptoms of SHARDA, such as restlessness, fatigue,  trouble concentrating, irritability, muscle tension, or sleep problems.  Before your health care provider can confirm a diagnosis of SHARDA, these symptoms must be present more days than they are not, and they must last for 6 months or longer.  How is this treated?  This condition may be treated with:  Medicine. Antidepressant medicine is usually prescribed for long-term daily control. Anti-anxiety medicines may be added in severe cases, especially when panic attacks occur.  Talk therapy (psychotherapy). Certain types of talk therapy can be helpful in treating HSARDA by providing support, education, and guidance. Options include:  Cognitive behavioral therapy (CBT). People learn coping skills and self-calming techniques to ease their physical symptoms. They learn to identify unrealistic thoughts and behaviors and to replace them with more appropriate thoughts and behaviors.  Acceptance and commitment therapy (ACT). This treatment teaches people how to be mindful as a way to cope with unwanted thoughts and feelings.  Biofeedback. This process trains you to manage your body's response (physiological response) through breathing techniques and relaxation methods. You will work with a therapist while machines are used to monitor your physical symptoms.  Stress management techniques. These include yoga, meditation, and exercise.  A mental health specialist can help determine which treatment is best for you. Some people see improvement with one type of therapy. However, other people require a combination of therapies.  Follow these instructions at home:  Lifestyle  Maintain a consistent routine and schedule.  Anticipate stressful situations. Create a plan and allow extra time to work with your plan.  Practice stress management or self-calming techniques that you have learned from your therapist or your health care provider.  Exercise regularly and spend time outdoors.  Eat a healthy diet that includes plenty of vegetables,  fruits, whole grains, low-fat dairy products, and lean protein.  Do not eat a lot of foods that are high in fat, added sugar, or salt (sodium).  Drink plenty of water.  Avoid alcohol. Alcohol can increase anxiety.  Avoid caffeine and certain over-the-counter cold medicines. These may make you feel worse. Ask your pharmacist which medicines to avoid.  General instructions  Take over-the-counter and prescription medicines only as told by your health care provider.  Understand that you are likely to have setbacks. Accept this and be kind to yourself as you persist to take better care of yourself.  Anticipate stressful situations. Create a plan and allow extra time to work with your plan.  Recognize and accept your accomplishments, even if you  them as small.  Spend time with people who care about you.  Keep all follow-up visits. This is important.  Where to find more information  National [place] of Mental Health: www.nimh.nih.gov  Substance Abuse and Mental Health Services: www.samhsa.gov  Contact a health care provider if:  Your symptoms do not get better.  Your symptoms get worse.  You have signs of depression, such as:  A persistently sad or irritable mood.  Loss of enjoyment in activities that used to bring you alex.  Change in weight or eating.  Changes in sleeping habits.  Get help right away if:  You have thoughts about hurting yourself or others.  If you ever feel like you may hurt yourself or others, or have thoughts about taking your own life, get help right away. Go to your nearest emergency department or:  Call your local emergency services (905 in the [place]  Call a suicide crisis helpline, such as the National Suicide Prevention Lifeline at 1-379.218.5286 or 751 in the [place] This is open 24 hours a day in the [place]  Text the Crisis Text Line at 119565 (in the [place]  Summary  Generalized anxiety disorder (SHARDA) is a mental health condition that involves worry that is not triggered by a specific  "event.  People with SHARDA often worry excessively about many things in their lives, such as their health and family.  SHARDA may cause symptoms such as restlessness, trouble concentrating, sleep problems, frequent sweating, nausea, diarrhea, headaches, and trembling or muscle twitching.  A mental health specialist can help determine which treatment is best for you. Some people see improvement with one type of therapy. However, other people require a combination of therapies.  This information is not intended to replace advice given to you by your health care provider. Make sure you discuss any questions you have with your health care provider.  Document Revised: 07/13/2022 Document Reviewed: 04/10/2022  [employer] Patient Education © 2022 [employer] Hypertension, Adult  High blood pressure (hypertension) is when the force of blood pumping through the arteries is too strong. The arteries are the blood vessels that carry blood from the heart throughout the body. Hypertension forces the heart to work harder to pump blood and may cause arteries to become narrow or stiff. Untreated or uncontrolled hypertension can cause a heart attack, heart failure, a stroke, kidney disease, and other problems.  A blood pressure reading consists of a higher number over a lower number. Ideally, your blood pressure should be below 120/80. The first (\"top\") number is called the systolic pressure. It is a measure of the pressure in your arteries as your heart beats. The second (\"bottom\") number is called the diastolic pressure. It is a measure of the pressure in your arteries as the heart relaxes.  What are the causes?  The exact cause of this condition is not known. There are some conditions that result in or are related to high blood pressure.  What increases the risk?  Some risk factors for high blood pressure are under your control. The following factors may make you more likely to develop this condition:  Smoking.  Having type 2 diabetes " mellitus, high cholesterol, or both.  Not getting enough exercise or physical activity.  Being overweight.  Having too much fat, sugar, calories, or salt (sodium) in your diet.  Drinking too much alcohol.  Some risk factors for high blood pressure may be difficult or impossible to change. Some of these factors include:  Having chronic kidney disease.  Having a family history of high blood pressure.  Age. Risk increases with age.  Race. You may be at higher risk if you are .  Gender. Men are at higher risk than women before age 45. After age 65, women are at higher risk than men.  Having obstructive sleep apnea.  Stress.  What are the signs or symptoms?  High blood pressure may not cause symptoms. Very high blood pressure (hypertensive crisis) may cause:  Headache.  Anxiety.  Shortness of breath.  Nosebleed.  Nausea and vomiting.  Vision changes.  Severe chest pain.  Seizures.  How is this diagnosed?  This condition is diagnosed by measuring your blood pressure while you are seated, with your arm resting on a flat surface, your legs uncrossed, and your feet flat on the floor. The cuff of the blood pressure monitor will be placed directly against the skin of your upper arm at the level of your heart. It should be measured at least twice using the same arm. Certain conditions can cause a difference in blood pressure between your right and left arms.  Certain factors can cause blood pressure readings to be lower or higher than normal for a short period of time:  When your blood pressure is higher when you are in a health care provider's office than when you are at home, this is called white coat hypertension. Most people with this condition do not need medicines.  When your blood pressure is higher at home than when you are in a health care provider's office, this is called masked hypertension. Most people with this condition may need medicines to control blood pressure.  If you have a high blood  pressure reading during one visit or you have normal blood pressure with other risk factors, you may be asked to:  Return on a different day to have your blood pressure checked again.  Monitor your blood pressure at home for 1 week or longer.  If you are diagnosed with hypertension, you may have other blood or imaging tests to help your health care provider understand your overall risk for other conditions.  How is this treated?  This condition is treated by making healthy lifestyle changes, such as eating healthy foods, exercising more, and reducing your alcohol intake. Your health care provider may prescribe medicine if lifestyle changes are not enough to get your blood pressure under control, and if:  Your systolic blood pressure is above 130.  Your diastolic blood pressure is above 80.  Your personal target blood pressure may vary depending on your medical conditions, your age, and other factors.  Follow these instructions at home:  Eating and drinking    Eat a diet that is high in fiber and potassium, and low in sodium, added sugar, and fat. An example eating plan is called the DASH (Dietary Approaches to Stop Hypertension) diet. To eat this way:  Eat plenty of fresh fruits and vegetables. Try to fill one half of your plate at each meal with fruits and vegetables.  Eat whole grains, such as whole-wheat pasta, brown rice, or whole-grain bread. Fill about one fourth of your plate with whole grains.  Eat or drink low-fat dairy products, such as skim milk or low-fat yogurt.  Avoid fatty cuts of meat, processed or cured meats, and poultry with skin. Fill about one fourth of your plate with lean proteins, such as fish, chicken without skin, beans, eggs, or tofu.  Avoid pre-made and processed foods. These tend to be higher in sodium, added sugar, and fat.  Reduce your daily sodium intake. Most people with hypertension should eat less than 1,500 mg of sodium a day.  Do not drink alcohol if:  Your health care provider  tells you not to drink.  You are pregnant, may be pregnant, or are planning to become pregnant.  If you drink alcohol:  Limit how much you use to:  0-1 drink a day for women.  0-2 drinks a day for men.  Be aware of how much alcohol is in your drink. In the [place] one drink equals one 12 oz bottle of beer (355 mL), one 5 oz glass of wine (148 mL), or one 1½ oz glass of hard liquor (44 mL).  Lifestyle    Work with your health care provider to maintain a healthy body weight or to lose weight. Ask what an ideal weight is for you.  Get at least 30 minutes of exercise most days of the week. Activities may include walking, swimming, or biking.  Include exercise to strengthen your muscles (resistance exercise), such as Pilates or lifting weights, as part of your weekly exercise routine. Try to do these types of exercises for 30 minutes at least 3 days a week.  Do not use any products that contain nicotine or tobacco, such as cigarettes, e-cigarettes, and chewing tobacco. If you need help quitting, ask your health care provider.  Monitor your blood pressure at home as told by your health care provider.  Keep all follow-up visits as told by your health care provider. This is important.  Medicines  Take over-the-counter and prescription medicines only as told by your health care provider. Follow directions carefully. Blood pressure medicines must be taken as prescribed.  Do not skip doses of blood pressure medicine. Doing this puts you at risk for problems and can make the medicine less effective.  Ask your health care provider about side effects or reactions to medicines that you should watch for.  Contact a health care provider if you:  Think you are having a reaction to a medicine you are taking.  Have headaches that keep coming back (recurring).  Feel dizzy.  Have swelling in your ankles.  Have trouble with your vision.  Get help right away if you:  Develop a severe headache or confusion.  Have unusual weakness or  numbness.  Feel faint.  Have severe pain in your chest or abdomen.  Vomit repeatedly.  Have trouble breathing.  Summary  Hypertension is when the force of blood pumping through your arteries is too strong. If this condition is not controlled, it may put you at risk for serious complications.  Your personal target blood pressure may vary depending on your medical conditions, your age, and other factors. For most people, a normal blood pressure is less than 120/80.  Hypertension is treated with lifestyle changes, medicines, or a combination of both. Lifestyle changes include losing weight, eating a healthy, low-sodium diet, exercising more, and limiting alcohol.  This information is not intended to replace advice given to you by your health care provider. Make sure you discuss any questions you have with your health care provider.  Document Revised: 08/28/2019 Document Reviewed: 08/28/2019  [employer] Patient Education © 2022 [employer]

## 2023-03-02 LAB
ALBUMIN SERPL-MCNC: 4.6 G/DL (ref 3.5–5.2)
ALBUMIN/GLOB SERPL: 1.6 G/DL
ALP SERPL-CCNC: 63 U/L (ref 39–117)
ALT SERPL W P-5'-P-CCNC: 37 U/L (ref 1–33)
ANION GAP SERPL CALCULATED.3IONS-SCNC: 14.5 MMOL/L (ref 5–15)
AST SERPL-CCNC: 26 U/L (ref 1–32)
BILIRUB SERPL-MCNC: 0.4 MG/DL (ref 0–1.2)
BUN SERPL-MCNC: 17 MG/DL (ref 8–23)
BUN/CREAT SERPL: 16 (ref 7–25)
CALCIUM SPEC-SCNC: 9.5 MG/DL (ref 8.6–10.5)
CHLORIDE SERPL-SCNC: 99 MMOL/L (ref 98–107)
CHOLEST SERPL-MCNC: 187 MG/DL (ref 0–200)
CO2 SERPL-SCNC: 25.5 MMOL/L (ref 22–29)
CREAT SERPL-MCNC: 1.06 MG/DL (ref 0.57–1)
EGFRCR SERPLBLD CKD-EPI 2021: 56.6 ML/MIN/1.73
GLOBULIN UR ELPH-MCNC: 2.8 GM/DL
GLUCOSE SERPL-MCNC: 105 MG/DL (ref 65–99)
HDLC SERPL-MCNC: 77 MG/DL (ref 40–60)
LDLC SERPL CALC-MCNC: 87 MG/DL (ref 0–100)
LDLC/HDLC SERPL: 1.08 {RATIO}
POTASSIUM SERPL-SCNC: 3.6 MMOL/L (ref 3.5–5.2)
PROT SERPL-MCNC: 7.4 G/DL (ref 6–8.5)
SODIUM SERPL-SCNC: 139 MMOL/L (ref 136–145)
TRIGL SERPL-MCNC: 136 MG/DL (ref 0–150)
TSH SERPL DL<=0.05 MIU/L-ACNC: 2.44 UIU/ML (ref 0.27–4.2)
VLDLC SERPL-MCNC: 23 MG/DL (ref 5–40)

## 2023-03-16 NOTE — PROGRESS NOTES
Cardiac Electrophysiology Outpatient Note  [facility] at [facility]    Office Visit     Stefani Severino  [id number]      Primary Care Physician: Rosa Norwood APRN        Subjective     Chief Complaint   Patient presents with   • Persistent atrial fibrillation (HCC)       History of Present Illness:   Stefani Severino is a 68 y.o. female who presents to  electrophysiology clinic for follow up of persistent atrial fibrillation, Multaq therapy and anticoagulation with Eliquis. Since we last saw the pt, pt denies any AF episodes, SOB, CP, LH, and dizziness. Denies any hospitalizations, ER visits, bleeding, or TIA/CVA symptoms. Overall feels well..    She has had a lot of stress recently and did have COVID last month.  She occasionally feels that her heart is beating harder and this may be associated with increased episodes of atrial fibrillation.  Otherwise she denies any new symptoms.  Past Medical History:   Diagnosis Date   • A-fib (HCC)    • Allergic 1994   • Anxiety    • Diastolic dysfunction     grade 1 with impaired relaxation   • H/O total hysterectomy with bilateral salpingo-oophorectomy (BSO)    • Headache    • Hearing loss    • History of appendectomy    • History of medical problems Sleep apnea,  aFib   • History of tonsillectomy    • History of tympanoplasty    • Hypercholesterolemia    • Hypertension    • Left atrial dilatation    • LVH (left ventricular hypertrophy)    • Obesity    • Sleep apnea    • Tinnitus        Past Surgical History:   Procedure Laterality Date   • APPENDECTOMY  1969   • HYSTERECTOMY  1984   • INNER EAR SURGERY Left 1997, 2005   • OOPHORECTOMY     • REDUCTION MAMMAPLASTY     • TONSILLECTOMY  1959       Family History   Problem Relation Age of Onset   • Dementia Mother    • Hydrocephalus Mother    • Ovarian cancer Mother 47   • Arthritis Mother    • Osteoporosis Mother    • Hyperthyroidism Mother    • Hypertension Mother    • Cancer Mother          Ovarian   • Anemia Mother    • Hearing loss Mother    • Thyroid disease Mother    • Vision loss Mother         Glaucoma   • Heart valve disorder Father    • Diabetes Father    • Heart disease Father         Valve issue   • Thyroid cancer Brother    • Hypertension Brother    • Hearing loss Brother    • Hypertension Brother    • Cancer Brother         Thyroid   • Mental illness Brother    • Breast cancer Neg Hx        Social History     Socioeconomic History   • Marital status:    Tobacco Use   • Smoking status: Former     Packs/day: 1.00     Years: 2.00     Pack years: 2.00     Types: Cigarettes     Start date: 1972     Quit date: 1974     Years since quittin.1   • Smokeless tobacco: Never   Vaping Use   • Vaping Use: Never used   Substance and Sexual Activity   • Alcohol use: Yes     Alcohol/week: 2.0 standard drinks     Types: 1 Glasses of wine, 1 Drinks containing 0.5 oz of alcohol per week     Comment: Less than 1 per week   • Drug use: No   • Sexual activity: Yes     Partners: Male     Birth control/protection: Post-menopausal         Current Outpatient Medications:   •  amLODIPine (NORVASC) 10 MG tablet, Take 1 tablet by mouth Daily., Disp: 90 tablet, Rfl: 3  •  apixaban (ELIQUIS) 5 MG tablet tablet, Take 1 tablet by mouth 2 (Two) Times a Day., Disp: 180 tablet, Rfl: 3  •  atorvastatin (LIPITOR) 20 MG tablet, Take 1 tablet by mouth Daily., Disp: 90 tablet, Rfl: 3  •  Azelastine HCl 137 MCG/SPRAY solution, Daily., Disp: , Rfl:   •  benazepril-hydrochlorthiazide (LOTENSIN HCT) 20-25 MG per tablet, Take 1 tablet by mouth Daily., Disp: 90 tablet, Rfl: 3  •  dronedarone (MULTAQ) 400 MG tablet, Take 1 tablet by mouth 2 (Two) Times a Day With Meals., Disp: 180 tablet, Rfl: 2  •  EPINEPHrine (EPIPEN) 0.3 MG/0.3ML solution auto-injector injection, , Disp: , Rfl:   •  escitalopram (LEXAPRO) 20 MG tablet, Take 1 tablet by mouth Daily., Disp: 90 tablet, Rfl: 3  •  guaiFENesin (Mucinex) 600 MG 12  "hr tablet, Take 1 tablet by mouth 2 (Two) Times a Day., Disp: 20 tablet, Rfl: 0  •  ipratropium (ATROVENT) 0.03 % nasal spray, As Needed., Disp: , Rfl:   •  ipratropium (ATROVENT) 0.03 % nasal spray, 2 sprays into the nostril(s) as directed by provider Every 12 (Twelve) Hours., Disp: , Rfl:   •  levocetirizine (XYZAL) 5 MG tablet, Take 1 tablet by mouth Every Evening., Disp: 90 tablet, Rfl: 1  •  mometasone (NASONEX) 50 MCG/ACT nasal spray, As Needed., Disp: , Rfl:   •  Semaglutide-Weight Management (Wegovy) 0.25 MG/0.5ML solution auto-injector, Inject 0.25 mg under the skin into the appropriate area as directed 1 (One) Time Per Week., Disp: 2 mL, Rfl: 0  •  valACYclovir (Valtrex) 500 MG tablet, Take 1 tablet by mouth 2 (Two) Times a Day., Disp: 30 tablet, Rfl: 0    Allergies:   Allergies   Allergen Reactions   • Dextroamphetamine Sulfate Hallucinations   • Penicillins Unknown (See Comments)     ALLERGY AS A CHILD AND DOESN'T RECALL REACTION       Objective   Vital Signs: Blood pressure 132/78, pulse 75, height 172.7 cm (68\"), weight 104 kg (229 lb), SpO2 94 %.    PHYSICAL EXAM  General appearance: Awake, alert, cooperative  Head: Normocephalic, without obvious abnormality, atraumatic  Neck: No JVD  Lungs: Clear to ascultation bilaterally  Heart: Regular rate and rhythm, no murmurs, 2+ LE pulses, no lower extremity swelling  Skin: Skin color, turgor normal, no rashes or lesions  Neurologic: Grossly normal     Lab Results   Component Value Date    GLUCOSE 105 (H) 03/01/2023    CALCIUM 9.5 03/01/2023     03/01/2023    K 3.6 03/01/2023    CO2 25.5 03/01/2023    CL 99 03/01/2023    BUN 17 03/01/2023    CREATININE 1.06 (H) 03/01/2023    EGFRIFNONA 62 01/25/2022    BCR 16.0 03/01/2023    ANIONGAP 14.5 03/01/2023     Lab Results   Component Value Date    WBC 5.33 03/01/2023    HGB 12.8 03/01/2023    HCT 38.3 03/01/2023    .3 (H) 03/01/2023     03/01/2023     No results found for: INR, PROTIME  Lab " Results   Component Value Date    TSH 2.440 03/01/2023          Results for orders placed during the hospital encounter of 08/29/18    Adult Transthoracic Echo Complete W/ Cont if Necessary Per Protocol    Interpretation Summary  · Left ventricular wall thickness is consistent with concentric hypertrophy.  · Left ventricular systolic function is normal. Estimated EF = 66%.  · Left ventricular diastolic dysfunction (grade I) consistent with impaired relaxation.  · Left atrial cavity size is mildly dilated.         I personally viewed and interpreted the patient's EKG/Telemetry/lab data      ECG 12 Lead    Date/Time: 3/17/2023 1:10 PM  Performed by: Wolfgang Conte MD  Authorized by: Wolfgang Conte MD   Comparison: compared with previous ECG from 3/18/2022  Similar to previous ECG  Comments: Sinus rhythm, low voltage QRS, nonspecific ST changes            Stefani Severino  reports that she quit smoking about 49 years ago. Her smoking use included cigarettes. She started smoking about 51 years ago. She has a 2.00 pack-year smoking history. She has never used smokeless tobacco..    Advance Care Planning   ACP discussion was held with the patient during this visit. Patient does not have an advance directive, information provided.         Assessment & Plan    1. Persistent atrial fibrillation (HCC)  She is a history of persistent atrial fibrillation on Multaq.  The QT complex is somewhat difficult to see on her EKG today.  I do believe I manually measured around 440 ms.  We will continue to monitor.  She is on Eliquis therapy which we will continue.  She does have some increased palpitations we will do a 2-week monitor to further assess this.    2. Long term current use of antiarrhythmic medical therapy  She is on Multaq and her EKG today looks well.  We will continue the current dose.    3. Essential hypertension  Her blood pressure is well controlled today.  We will continue her on her benazepril,  hydrochlorothiazide, and amlodipine.     4. RBI: Complitnce with CPAP.

## 2023-03-17 ENCOUNTER — OFFICE VISIT (OUTPATIENT)
Dept: CARDIOLOGY | Facility: CLINIC | Age: 68
End: 2023-03-17
Payer: MEDICARE

## 2023-03-17 VITALS
HEIGHT: 68 IN | HEART RATE: 75 BPM | SYSTOLIC BLOOD PRESSURE: 132 MMHG | OXYGEN SATURATION: 94 % | BODY MASS INDEX: 34.71 KG/M2 | WEIGHT: 229 LBS | DIASTOLIC BLOOD PRESSURE: 78 MMHG

## 2023-03-17 DIAGNOSIS — I48.19 PERSISTENT ATRIAL FIBRILLATION: Primary | ICD-10-CM

## 2023-03-17 PROCEDURE — 93000 ELECTROCARDIOGRAM COMPLETE: CPT | Performed by: STUDENT IN AN ORGANIZED HEALTH CARE EDUCATION/TRAINING PROGRAM

## 2023-03-17 PROCEDURE — 99214 OFFICE O/P EST MOD 30 MIN: CPT | Performed by: STUDENT IN AN ORGANIZED HEALTH CARE EDUCATION/TRAINING PROGRAM

## 2023-03-17 PROCEDURE — 3075F SYST BP GE 130 - 139MM HG: CPT | Performed by: STUDENT IN AN ORGANIZED HEALTH CARE EDUCATION/TRAINING PROGRAM

## 2023-03-17 PROCEDURE — 3078F DIAST BP <80 MM HG: CPT | Performed by: STUDENT IN AN ORGANIZED HEALTH CARE EDUCATION/TRAINING PROGRAM

## 2023-03-17 RX ORDER — AZELASTINE HYDROCHLORIDE 137 UG/1
SPRAY, METERED NASAL DAILY
COMMUNITY
Start: 2023-03-03 | End: 2023-03-27

## 2023-03-20 DIAGNOSIS — I48.19 PERSISTENT ATRIAL FIBRILLATION: Primary | ICD-10-CM

## 2023-03-23 ENCOUNTER — TELEPHONE (OUTPATIENT)
Dept: FAMILY MEDICINE CLINIC | Facility: CLINIC | Age: 68
End: 2023-03-23

## 2023-03-23 DIAGNOSIS — E66.9 OBESITY (BMI 30-39.9): ICD-10-CM

## 2023-03-23 RX ORDER — APIXABAN 5 MG/1
TABLET, FILM COATED ORAL
Qty: 180 TABLET | Refills: 2 | Status: SHIPPED | OUTPATIENT
Start: 2023-03-23

## 2023-03-23 RX ORDER — SEMAGLUTIDE 0.25 MG/.5ML
INJECTION, SOLUTION SUBCUTANEOUS
Qty: 2 ML | Refills: 0 | Status: SHIPPED | OUTPATIENT
Start: 2023-03-23 | End: 2023-03-27 | Stop reason: DRUGHIGH

## 2023-03-23 RX ORDER — DRONEDARONE 400 MG/1
TABLET, FILM COATED ORAL
Qty: 180 TABLET | Refills: 2 | Status: SHIPPED | OUTPATIENT
Start: 2023-03-23

## 2023-03-23 NOTE — TELEPHONE ENCOUNTER
Rx Refill Note  Requested Prescriptions     Pending Prescriptions Disp Refills   • Wegovy 0.25 MG/0.5ML solution auto-injector [Pharmacy Med Name: WEGOVY 0.25MG/0.5ML PF PEN INJ] 2 mL 0     Sig: ADMINISTER 0.25MG UNDER THE SKIN 1 TIME EVERY WEEK AS DIRECTED      Please see message        Last office visit with prescribing clinician: 3/1/2023   Last telemedicine visit with prescribing clinician: 3/23/2023   Next office visit with prescribing clinician: 3/23/2023                         Would you like a call back once the refill request has been completed: [] Yes [] No    If the office needs to give you a call back, can they leave a voicemail: [] Yes [] No    Pamela Foster RN  03/23/23, 14:40 EDT

## 2023-03-23 NOTE — TELEPHONE ENCOUNTER
Caller: Stefani Severino    Relationship: Self    Best call back number:     [phone]     Which medication are you concerned about:     Semaglutide-Weight Management (Wegovy) 0.25 MG/0.5ML solution auto-injector    Who prescribed you this medication:     MYLENE MONTES    What are your concerns:     PATIENT STATED PHARMACY REQUIRES A NEW PRESCRIPTION FOR THE MEDICATION SINCE MONTHLY THE DOSAGE WILL INCREASE UNTIL MAINTENANCE IS REACHED AT THE 4TH MONTH    PREFERRED PHARMACY:    [facility] [place], [place]    TELEPHONE CONTACT:    [phone]

## 2023-03-27 ENCOUNTER — OFFICE VISIT (OUTPATIENT)
Dept: FAMILY MEDICINE CLINIC | Facility: CLINIC | Age: 68
End: 2023-03-27
Payer: MEDICARE

## 2023-03-27 VITALS
SYSTOLIC BLOOD PRESSURE: 124 MMHG | OXYGEN SATURATION: 98 % | TEMPERATURE: 97.8 F | WEIGHT: 229 LBS | DIASTOLIC BLOOD PRESSURE: 70 MMHG | BODY MASS INDEX: 34.71 KG/M2 | RESPIRATION RATE: 18 BRPM | HEIGHT: 68 IN | HEART RATE: 80 BPM

## 2023-03-27 DIAGNOSIS — M25.561 ACUTE PAIN OF RIGHT KNEE: Primary | ICD-10-CM

## 2023-03-27 DIAGNOSIS — E66.9 OBESITY (BMI 30-39.9): ICD-10-CM

## 2023-03-27 RX ORDER — SEMAGLUTIDE 0.5 MG/.5ML
0.5 INJECTION, SOLUTION SUBCUTANEOUS WEEKLY
Qty: 2 ML | Refills: 2 | Status: SHIPPED | OUTPATIENT
Start: 2023-03-27

## 2023-03-27 NOTE — PROGRESS NOTES
"Chief Complaint  Knee Pain (Right knee pain - No known injury. Started less than a month ago.//)    Subjective        Stefani Severino presents to [facility] for acute care (right knee pain).    Knee Pain   Incident onset: No acute injury/incident. Knee pain started approximately 3 weeks ago. Incident location: No acute injury/incident. There was no injury mechanism. The pain is present in the right knee. The quality of the pain is described as aching. The pain is at a severity of 4/10. The pain has been fluctuating since onset. Pertinent negatives include no inability to bear weight, loss of motion, loss of sensation, muscle weakness, numbness or tingling. She reports no foreign bodies present. Exacerbated by: twisting; standing for long periods. She has tried acetaminophen, NSAIDs, rest, non-weight bearing, ice and heat for the symptoms. The treatment provided mild relief.     Objective   Vital Signs:  /70   Pulse 80   Temp 97.8 °F (36.6 °C) (Temporal)   Resp 18   Ht 172.7 cm (68\")   Wt 104 kg (229 lb)   SpO2 98%   BMI 34.82 kg/m²   Estimated body mass index is 34.82 kg/m² as calculated from the following:    Height as of this encounter: 172.7 cm (68\").    Weight as of this encounter: 104 kg (229 lb).       BMI is >= 30 and <35. (Class 1 Obesity). The following options were offered after discussion;: weight loss educational material (shared in after visit summary)      Physical Exam  Vitals and nursing note reviewed.   Constitutional:       General: She is awake.      Appearance: Normal appearance.   HENT:      Head: Normocephalic.      Right Ear: Hearing and external ear normal.      Left Ear: Hearing and external ear normal.      Nose: Nose normal.      Mouth/Throat:      Lips: Pink.      Mouth: Mucous membranes are moist.   Eyes:      General: Lids are normal.      Conjunctiva/sclera: Conjunctivae normal.      Pupils: Pupils are equal, round, and reactive to " light.   Cardiovascular:      Rate and Rhythm: Normal rate and regular rhythm.      Heart sounds: Normal heart sounds.   Pulmonary:      Effort: Pulmonary effort is normal.      Breath sounds: Normal breath sounds.   Abdominal:      General: Abdomen is protuberant. Bowel sounds are normal.      Palpations: Abdomen is soft.      Tenderness: There is no abdominal tenderness.   Musculoskeletal:         General: Normal range of motion.      Cervical back: Normal range of motion.      Right knee: No swelling or erythema. Normal range of motion. Tenderness present.      Right lower leg: No edema.      Left lower leg: No edema.   Skin:     General: Skin is warm and dry.      Capillary Refill: Capillary refill takes less than 2 seconds.   Neurological:      Mental Status: She is alert and oriented to person, place, and time.      Sensory: Sensation is intact.      Motor: Motor function is intact.      Coordination: Coordination is intact.      Gait: Gait is intact.   Psychiatric:         Attention and Perception: Attention and perception normal.         Mood and Affect: Mood and affect normal.         Speech: Speech normal.         Behavior: Behavior normal. Behavior is cooperative.         Thought Content: Thought content normal.         Cognition and Memory: Cognition normal.         Judgment: Judgment normal.          Result Review :  The following data was reviewed by: BECKA Bueno on 03/27/2023:    Data reviewed: Radiologic studies 3/27/2023 - x-ray right knee    Assessment and Plan   Diagnoses and all orders for this visit:    1. Acute pain of right knee (Primary)  Comments:  Symptomatic/Supportive Care; knee brace for stability; Rest/Ice/Compression/Elevation; Tylenol arthritis/Ibuprofen OTC as needed.  Orders:  -     XR Knee 1 or 2 View Right (In Office)  -     Ambulatory Referral to Physical Therapy    2. Obesity (BMI 30-39.9)  -     Semaglutide-Weight Management (Wegovy) 0.5 MG/0.5ML solution auto-injector;  Inject 0.5 mL under the skin into the appropriate area as directed 1 (One) Time Per Week.  Dispense: 2 mL; Refill: 2         I spent 20 minutes caring for Stefani on this date of service. This time includes time spent by me in the following activities:preparing for the visit, reviewing tests, obtaining and/or reviewing a separately obtained history, performing a medically appropriate examination and/or evaluation , counseling and educating the patient/family/caregiver, ordering medications, tests, or procedures, referring and communicating with other health care professionals , documenting information in the medical record, independently interpreting results and communicating that information with the patient/family/caregiver and care coordination     Follow Up   Return if symptoms worsen or fail to improve.  Patient was given instructions and counseling regarding her condition or for health maintenance advice. Please see specific information pulled into the AVS if appropriate.       This document has been electronically signed by BECKA Bueno  March 27, 2023 21:25 EDT

## 2023-03-28 DIAGNOSIS — E66.9 OBESITY (BMI 30-39.9): ICD-10-CM

## 2023-03-28 RX ORDER — SEMAGLUTIDE 0.25 MG/.5ML
INJECTION, SOLUTION SUBCUTANEOUS
Qty: 2 ML | Refills: 0 | OUTPATIENT
Start: 2023-03-28

## 2023-04-24 DIAGNOSIS — E66.9 OBESITY (BMI 30-39.9): ICD-10-CM

## 2023-04-24 RX ORDER — SEMAGLUTIDE 0.25 MG/.5ML
INJECTION, SOLUTION SUBCUTANEOUS
Qty: 2 ML | Refills: 0 | OUTPATIENT
Start: 2023-04-24

## 2023-04-27 NOTE — TELEPHONE ENCOUNTER
Caller: Stefani Severino    Relationship: Self    Best call back number: [phone]    What test/procedure requested: WEGOVY    When is it needed: ASAP    Where is the test/procedure going to be performed: PHARMACY    Additional information or concerns: WAS TOLD THIS NEEDS PRIOR AUTH.    [facility] #[id number] - [place], [place] - [address] - [phone] [facility] [phone] FX

## 2023-04-27 NOTE — TELEPHONE ENCOUNTER
PT WANTS TO KNOW WHY MEDICATION REFUSED.  THINKS THAT IT JUST NEEDS A PA DONE.  PLEASE CALL PT BACK @ [phone].    THANK YOU

## 2023-04-28 NOTE — TELEPHONE ENCOUNTER
PATIENT STATES PAPERWORK WAS FAXED OVER FROM THE INSURANCE COMPANY AND WOULD LIKE TO KNOW THAT STATUS OF THIS REQUEST PLEASE ADVICE   PATIENT WOULD LIKE IT TO GO TO THIS PHARMACY   [id number] - [place], [place] - [facility] [address] AT [facility] - [phone] [facility] [phone]   [phone]

## 2023-05-01 ENCOUNTER — PRIOR AUTHORIZATION (OUTPATIENT)
Dept: FAMILY MEDICINE CLINIC | Facility: CLINIC | Age: 68
End: 2023-05-01
Payer: MEDICARE

## 2023-05-01 ENCOUNTER — TELEPHONE (OUTPATIENT)
Dept: FAMILY MEDICINE CLINIC | Facility: CLINIC | Age: 68
End: 2023-05-01

## 2023-05-01 NOTE — TELEPHONE ENCOUNTER
Caller: Stefani Severino    Relationship: Self    Best call back number: [phone]    What is the best time to reach you: ANYTIME    Who are you requesting to speak with (clinical staff, provider,  specific staff member): PROVIDER OR CLINICAL STAFF    What was the call regarding: FOLLOWING UP ON PRIOR AUTHORIZATION FOR WEGOVY. PLEASE ADVISE    Do you require a callback: YES

## 2023-05-01 NOTE — TELEPHONE ENCOUNTER
"Re: TEODORO VILLANUEVA,  1955,    Message:  The Prior Authorization request has been approved for Wegovy 0.25MG/0.5ML SC SOAJ.  The authorization is valid from 04/01/2023 through 04/30/2024. A letter of explanation will also be  mailed to the patient.        Medication Approval:   Valid: 04/01/2023 through 04/30/2024  As long as you stay enrolled in your current health plan.      Current Starting Weight:   Weight: 229lbs  Height: 68\"  BMI: 34.8     Diagnosis ICD 10 Used:  BMI:Z68.34  BMI Class: E66.01      "

## 2023-06-01 ENCOUNTER — OFFICE VISIT (OUTPATIENT)
Dept: FAMILY MEDICINE CLINIC | Facility: CLINIC | Age: 68
End: 2023-06-01

## 2023-06-01 VITALS
TEMPERATURE: 96.9 F | HEIGHT: 68 IN | BODY MASS INDEX: 33.37 KG/M2 | SYSTOLIC BLOOD PRESSURE: 120 MMHG | RESPIRATION RATE: 18 BRPM | DIASTOLIC BLOOD PRESSURE: 84 MMHG | HEART RATE: 67 BPM | WEIGHT: 220.2 LBS | OXYGEN SATURATION: 96 %

## 2023-06-01 DIAGNOSIS — Z00.00 ENCOUNTER FOR SUBSEQUENT ANNUAL WELLNESS VISIT (AWV) IN MEDICARE PATIENT: Primary | ICD-10-CM

## 2023-06-01 DIAGNOSIS — E66.9 OBESITY (BMI 30-39.9): ICD-10-CM

## 2023-06-01 PROCEDURE — 3074F SYST BP LT 130 MM HG: CPT | Performed by: NURSE PRACTITIONER

## 2023-06-01 PROCEDURE — 1160F RVW MEDS BY RX/DR IN RCRD: CPT | Performed by: NURSE PRACTITIONER

## 2023-06-01 PROCEDURE — 1170F FXNL STATUS ASSESSED: CPT | Performed by: NURSE PRACTITIONER

## 2023-06-01 PROCEDURE — 1159F MED LIST DOCD IN RCRD: CPT | Performed by: NURSE PRACTITIONER

## 2023-06-01 PROCEDURE — G0439 PPPS, SUBSEQ VISIT: HCPCS | Performed by: NURSE PRACTITIONER

## 2023-06-01 PROCEDURE — 3079F DIAST BP 80-89 MM HG: CPT | Performed by: NURSE PRACTITIONER

## 2023-06-01 RX ORDER — SEMAGLUTIDE 0.5 MG/.5ML
0.5 INJECTION, SOLUTION SUBCUTANEOUS WEEKLY
Qty: 2 ML | Refills: 2 | Status: SHIPPED | OUTPATIENT
Start: 2023-06-01

## 2023-06-01 NOTE — PROGRESS NOTES
The ABCs of the Annual Wellness Visit  Subsequent Medicare Wellness Visit    Subjective      Stefani Severino is a 68 y.o. female who presents for a Subsequent Medicare Wellness Visit.    The following portions of the patient's history were reviewed and   updated as appropriate: allergies, current medications, past family history, past medical history, past social history, past surgical history and problem list.  Compared to one year ago, the patient feels her physical   health is the same.    Compared to one year ago, the patient feels her mental   health is the same.    Recent Hospitalizations:  She was not admitted to the hospital during the last year.     Current Medical Providers:  Patient Care Team:  Rosa Norwood APRN as PCP - General (Family Medicine)  Nilsa Jackman David M, MD as Consulting Physician (Neurology)  oKrtney Guaman MD as Consulting Physician (Otolaryngology)  Wolfgang Conte MD as Consulting Physician (Cardiology)    Outpatient Medications Prior to Visit   Medication Sig Dispense Refill   • amLODIPine (NORVASC) 10 MG tablet Take 1 tablet by mouth Daily. 90 tablet 3   • atorvastatin (LIPITOR) 20 MG tablet Take 1 tablet by mouth Daily. 90 tablet 3   • benazepril-hydrochlorthiazide (LOTENSIN HCT) 20-25 MG per tablet Take 1 tablet by mouth Daily. 90 tablet 3   • Eliquis 5 MG tablet tablet TAKE 1 TABLET BY MOUTH TWICE DAILY 180 tablet 2   • EPINEPHrine (EPIPEN) 0.3 MG/0.3ML solution auto-injector injection      • escitalopram (LEXAPRO) 20 MG tablet Take 1 tablet by mouth Daily. 90 tablet 3   • guaiFENesin (Mucinex) 600 MG 12 hr tablet Take 1 tablet by mouth 2 (Two) Times a Day. 20 tablet 0   • levocetirizine (XYZAL) 5 MG tablet Take 1 tablet by mouth Every Evening. 90 tablet 1   • mometasone (NASONEX) 50 MCG/ACT nasal spray As Needed.     • Multaq 400 MG tablet TAKE 1 TABLET BY MOUTH TWICE DAILY WITH MEALS 180 tablet 2   • Semaglutide-Weight Management 0.25 MG/0.5ML  "solution auto-injector Inject 0.25 mg under the skin into the appropriate area as directed 1 (One) Time Per Week. 2 mL 0   • ipratropium (ATROVENT) 0.03 % nasal spray 2 sprays into the nostril(s) as directed by provider Every 12 (Twelve) Hours. (Patient not taking: Reported on 6/1/2023)     • valACYclovir (Valtrex) 500 MG tablet Take 1 tablet by mouth 2 (Two) Times a Day. (Patient not taking: Reported on 6/1/2023) 30 tablet 0     No facility-administered medications prior to visit.       No opioid medication identified on active medication list. I have reviewed chart for other potential  high risk medication/s and harmful drug interactions in the elderly.          Aspirin is not on active medication list.  Aspirin use is contraindicated for this patient due to: current use of Eliquis.  .    Patient Active Problem List   Diagnosis   • Persistent atrial fibrillation   • Long term current use of antiarrhythmic medical therapy   • Essential hypertension   • Generalized anxiety disorder   • BRI on CPAP   • Arthritis of right shoulder region   • Prediabetes   • Non-refractory chronic migraine without aura   • Obesity (BMI 30-39.9)   • Cholesteatoma of left ear   • Mixed hyperlipidemia     Advance Care Planning   Advance Care Planning     Advance Directive is not on file.  ACP discussion was held with the patient during this visit. Patient does not have an advance directive, information provided.     Objective    Vitals:    06/01/23 0807   BP: 120/84   BP Location: Right arm   Patient Position: Sitting   Cuff Size: Adult   Pulse: 67   Resp: 18   Temp: 96.9 °F (36.1 °C)   TempSrc: Temporal   SpO2: 96%   Weight: 99.9 kg (220 lb 3.2 oz)   Height: 172.7 cm (68\")     Estimated body mass index is 33.48 kg/m² as calculated from the following:    Height as of this encounter: 172.7 cm (68\").    Weight as of this encounter: 99.9 kg (220 lb 3.2 oz).    BMI is >= 30 and <35. (Class 1 Obesity). The following options were offered after " discussion;: weight loss educational material (shared in after visit summary)    Does the patient have evidence of cognitive impairment?   No            HEALTH RISK ASSESSMENT    Smoking Status:  Social History     Tobacco Use   Smoking Status Former   • Packs/day: 1.00   • Years: 2.00   • Pack years: 2.00   • Types: Cigarettes   • Start date: 1972   • Quit date: 1974   • Years since quittin.3   Smokeless Tobacco Never     Alcohol Consumption:  Social History     Substance and Sexual Activity   Alcohol Use Yes   • Alcohol/week: 1.0 standard drink   • Types: 1 Drinks containing 0.5 oz of alcohol per week    Comment: Less than 1 per week     Fall Risk Screen:    MARY Fall Risk Assessment was completed, and patient is at MODERATE risk for falls. Assessment completed on:2023    Depression Screenin/1/2023     8:10 AM   PHQ-2/PHQ-9 Depression Screening   Little Interest or Pleasure in Doing Things 0-->not at all   Feeling Down, Depressed or Hopeless 0-->not at all   PHQ-9: Brief Depression Severity Measure Score 0       Health Habits and Functional and Cognitive Screenin/1/2023     8:00 AM   Functional & Cognitive Status   Do you have difficulty preparing food and eating? No   Do you have difficulty bathing yourself, getting dressed or grooming yourself? No   Do you have difficulty using the toilet? No   Do you have difficulty moving around from place to place? No   Do you have trouble with steps or getting out of a bed or a chair? No   Current Diet Frequent Junk Food   Dental Exam Up to date   Eye Exam Up to date   Exercise (times per week) 3 times per week   Current Exercises Include Gardening;Yard Work   Do you need help using the phone?  No   Are you deaf or do you have serious difficulty hearing?  Yes   Do you need help with transportation? No   Do you need help shopping? No   Do you need help preparing meals?  No   Do you need help with housework?  No   Do you need help with  laundry? No   Do you need help taking your medications? No   Do you need help managing money? No   Do you ever drive or ride in a car without wearing a seat belt? No   Have you felt unusual stress, anger or loneliness in the last month? No   Who do you live with? Spouse   If you need help, do you have trouble finding someone available to you? No   Have you been bothered in the last four weeks by sexual problems? No   Do you have difficulty concentrating, remembering or making decisions? No       Age-appropriate Screening Schedule:  Refer to the list below for future screening recommendations based on patient's age, sex and/or medical conditions. Orders for these recommended tests are listed in the plan section. The patient has been provided with a written plan.    Health Maintenance   Topic Date Due   • COLORECTAL CANCER SCREENING  08/31/2023 (Originally 1955)   • Pneumococcal Vaccine 65+ (2 - PCV) 06/01/2024 (Originally 11/2/2019)   • INFLUENZA VACCINE  08/01/2023   • DXA SCAN  02/22/2024   • LIPID PANEL  03/01/2024   • ANNUAL WELLNESS VISIT  06/01/2024   • MAMMOGRAM  03/01/2025   • TDAP/TD VACCINES (2 - Td or Tdap) 10/21/2029   • HEPATITIS C SCREENING  Completed   • ZOSTER VACCINE  Completed   • COVID-19 Vaccine  Discontinued                  CMS Preventative Services Quick Reference  Risk Factors Identified During Encounter:    Polypharmacy: Medication List reviewed    The above risks/problems have been discussed with the patient.  Pertinent information has been shared with the patient in the After Visit Summary.    Diagnoses and all orders for this visit:    1. Encounter for subsequent annual wellness visit (AWV) in Medicare patient (Primary)  Comments:  Continue current plan of care    2. Obesity (BMI 30-39.9)  -     Semaglutide-Weight Management (Wegovy) 0.5 MG/0.5ML solution auto-injector; Inject 0.5 mL under the skin into the appropriate area as directed 1 (One) Time Per Week.  Dispense: 2 mL; Refill:  2      The preventive exam has been reviewed in detail.  The patient has been fully counseled on preventative guidelines for vaccines, cancer screenings, and other health maintenance needs.   The patient has been counseled on guidelines for maintaining a lifestyle to promote good health and to minimize chronic diseases.  The patient has been assisted with scheduling these healthcare procedures for the coming year and given a written document of health maintenance and anticipatory guidance for age with the AVS.    I spent 30 minutes caring for Stefani on this date of service. This time includes time spent by me in the following activities:preparing for the visit, reviewing tests, obtaining and/or reviewing a separately obtained history, performing a medically appropriate examination and/or evaluation , counseling and educating the patient/family/caregiver, ordering medications, tests, or procedures, referring and communicating with other health care professionals , documenting information in the medical record, independently interpreting results and communicating that information with the patient/family/caregiver and care coordination     Follow Up:   Next Medicare Wellness visit to be scheduled in 1 year.      An After Visit Summary and PPPS were made available to the patient.        This document has been electronically signed by BECKA Bueno  June 1, 2023 09:20 EDT

## 2023-06-08 RX ORDER — SODIUM, POTASSIUM,MAG SULFATES 17.5-3.13G
SOLUTION, RECONSTITUTED, ORAL ORAL
Qty: 354 ML | Refills: 0 | Status: SHIPPED | OUTPATIENT
Start: 2023-06-08

## 2023-08-22 ENCOUNTER — TELEPHONE (OUTPATIENT)
Dept: SLEEP MEDICINE | Facility: HOSPITAL | Age: 68
End: 2023-08-22

## 2023-08-22 NOTE — TELEPHONE ENCOUNTER
Caller: Stefani Severino    Relationship to patient: Self    Best call back number: [phone]    Patient is needing: NEEDS NEW SCRIPT FOR C-PAP MACHINE CURRENT ONE IS HARD TO TURN ON AND OFF. ALSO PATIENT IS ON THE ANG RECALL LIST WHICH SHE DOESN'T WANT TO BE REMOVED FROM BUT IS IN NEED OF A NEW C-PAP MACHINE . OK TO LEAVE VOICEMAIL

## 2023-08-29 ENCOUNTER — OFFICE VISIT (OUTPATIENT)
Dept: SLEEP MEDICINE | Facility: CLINIC | Age: 68
End: 2023-08-29
Payer: MEDICARE

## 2023-08-29 VITALS
WEIGHT: 218 LBS | BODY MASS INDEX: 33.04 KG/M2 | SYSTOLIC BLOOD PRESSURE: 139 MMHG | OXYGEN SATURATION: 95 % | DIASTOLIC BLOOD PRESSURE: 81 MMHG | HEART RATE: 75 BPM | HEIGHT: 68 IN

## 2023-08-29 DIAGNOSIS — E66.9 OBESITY (BMI 30-39.9): ICD-10-CM

## 2023-08-29 DIAGNOSIS — G47.33 OBSTRUCTIVE SLEEP APNEA, ADULT: Primary | ICD-10-CM

## 2023-08-29 NOTE — PROGRESS NOTES
Follow Up Office Visit      Patient Name: Stefani Severino    Chief Complaint:    Chief Complaint   Patient presents with    Follow-up       History of Present Illness: Stefani Severino is a 68 y.o. female who is here today for follow up of Sleep apnea     67 y.o. femalewith a history of moderate BRI diagnosed in 2009, atrial fibrillation, diastolic dysfunction, HTN, HL, left atrial dilation, LVH, and obesity.  Patient has been on CPAP therapy for long time and has been doing well.  Her Rola respiratory machine is on recall and she is unable to get a new machine despite multiple attempts.  She comes today for follow-up and states that at times her machine is giving out problems for her and she will get any pressure at all when she puts it on.  She has changed the hose and that seems to have helped to some degree but she still struggles with the machine.  Her recent machine is about 6 years old.  She states that she is benefiting from the CPAP device.  She states that whenever she was not able to use the CPAP device for few days she goes into atrial fibrillation rhythm.  Currently has been doing well from cardiac standpoint and she would want to continue using the machine but she is worried that her machine is going to give out.  She has contacted [employer] and they are not able to get her replacement device in time.  She had sleep study done in 2009 and nothing recent.  Denies any restless leg symptoms.  Denies any other sleep parasomnias.      Subjective      Review of Systems:   Review of Systems   Constitutional:  Positive for fatigue.   HENT: Negative.     Respiratory: Negative.     Cardiovascular: Negative.    Gastrointestinal: Negative.    Endocrine: Negative.    Musculoskeletal:  Positive for arthralgias.   Skin: Negative.    Neurological: Negative.    Hematological: Negative.    Psychiatric/Behavioral: Negative.     All other systems reviewed and are negative.    The following portions of  "the patient's history were reviewed and updated as appropriate: allergies, current medications, past family history, past medical history, past social history, past surgical history and problem list.    Objective     Physical Exam:  Vital Signs:   Vitals:    08/29/23 1042   BP: 139/81   BP Location: Right arm   Patient Position: Sitting   Pulse: 75   SpO2: 95%   Weight: 98.9 kg (218 lb)   Height: 172.7 cm (68\")     Body mass index is 33.15 kg/mý.    Physical Exam  Vitals and nursing note reviewed.   Constitutional:       General: She is not in acute distress.     Appearance: She is well-developed. She is not diaphoretic.   HENT:      Head: Normocephalic and atraumatic.      Comments: Mallampati 3 airway  Neck:      Thyroid: No thyromegaly.      Trachea: No tracheal deviation.   Cardiovascular:      Rate and Rhythm: Normal rate and regular rhythm.      Heart sounds: Normal heart sounds. No murmur heard.    No friction rub. No gallop.   Pulmonary:      Effort: Pulmonary effort is normal. No respiratory distress.      Breath sounds: Normal breath sounds. No wheezing or rales.   Musculoskeletal:         General: No tenderness.      Right lower leg: No edema.      Left lower leg: No edema.   Neurological:      Mental Status: She is alert and oriented to person, place, and time.      Cranial Nerves: No cranial nerve deficit.   Psychiatric:         Behavior: Behavior normal.         Thought Content: Thought content normal.         Judgment: Judgment normal.       Results Review:   I reviewed the patient's new clinical results.    Unable to download CPAP device.     Assessment / Plan      Assessment:   Problem List Items Addressed This Visit          Endocrine and Metabolic    Obesity (BMI 30-39.9)     Other Visit Diagnoses       Obstructive sleep apnea, adult    -  Primary    Relevant Orders    PAP Therapy    Home Sleep Study            Plan:   1.  Patient with moderate obstructive sleep apnea, cardiac arrhythmias, " noncompliant with CPAP therapy per verbal report.  Unable to get download on the CPAP device.  She is having problem with machine with intermittent nonfunctioning of the machine.  Her machine is also on the recall through [employer].  She is unable to get a new device at this time.  I will send an order to the DME company to repair or replace the machine.  Hopefully that can be achieved and we should be able to get her new machine.  She is subjectively improved with the use of CPAP.  She needs ongoing use of CPAP due to her underlying health conditions.  2.  If we are unable to get her a new CPAP device in this manner then we will proceed with home study to reestablish the diagnosis of sleep apnea and see if we can get a new machine through Medicare.  Rational for that testing reviewed with the patient and she is amenable to going through another sleep study.  Discussed that she will need to do that study without CPAP device and she is amenable to that.  Offered her in lab study but she would prefer home-based testing.  We will start the approval process in the meantime.  3.  Increasing activity and weight loss counseled on its impact on sleep apnea reviewed.    Follow Up:   After HST    Discussed plan of care in detail with patient today. Patient verbally understands and agrees. I spent 35 minutes on this date of service. This time includes time spent by me in the following activities:preparing for the visit, reviewing tests, obtaining and/or reviewing a separately obtained history, performing a medically appropriate examination, counseling the patient, ordering medications, tests, or procedures, and/or documenting information in the medical record. This time excludes other separate billable services such as interpretation of tests or procedures, if applicable.     Esau Tang MD  Pulmonary Critical Care and Sleep Medicine

## 2023-08-30 ENCOUNTER — OFFICE VISIT (OUTPATIENT)
Dept: GASTROENTEROLOGY | Facility: CLINIC | Age: 68
End: 2023-08-30
Payer: MEDICARE

## 2023-08-30 VITALS
HEART RATE: 75 BPM | SYSTOLIC BLOOD PRESSURE: 149 MMHG | HEIGHT: 68 IN | BODY MASS INDEX: 33.22 KG/M2 | TEMPERATURE: 97.1 F | WEIGHT: 219.2 LBS | DIASTOLIC BLOOD PRESSURE: 60 MMHG

## 2023-08-30 DIAGNOSIS — R19.7 DIARRHEA, UNSPECIFIED TYPE: Primary | ICD-10-CM

## 2023-08-30 PROCEDURE — 3077F SYST BP >= 140 MM HG: CPT | Performed by: NURSE PRACTITIONER

## 2023-08-30 PROCEDURE — 99214 OFFICE O/P EST MOD 30 MIN: CPT | Performed by: NURSE PRACTITIONER

## 2023-08-30 PROCEDURE — 1160F RVW MEDS BY RX/DR IN RCRD: CPT | Performed by: NURSE PRACTITIONER

## 2023-08-30 PROCEDURE — 1159F MED LIST DOCD IN RCRD: CPT | Performed by: NURSE PRACTITIONER

## 2023-08-30 PROCEDURE — 3078F DIAST BP <80 MM HG: CPT | Performed by: NURSE PRACTITIONER

## 2023-08-30 NOTE — PROGRESS NOTES
GASTROENTEROLOGY OFFICE NOTE  Stefani Severino  [id number]  1955    CARE TEAM  Patient Care Team:  Rosa Norwood APRN as PCP - General (Family Medicine)  Nilsa Jackman David M, MD as Consulting Physician (Neurology)  Kortney Guaman MD as Consulting Physician (Otolaryngology)  Wolfgang Conte MD as Consulting Physician (Cardiology)    Referring Provider: Rosa Norwood APRN    Chief Complaint   Patient presents with    Diarrhea        HISTORY OF PRESENT ILLNESS:  Patient is a 68-year-old female presenting today with complaints of diarrhea.    She underwent a screening colonoscopy on 6/22/2023 with Dr. Gottlieb.  This was a routine screening. Five polyps were removed that proved to be adenomatous polyps with recommendations for repeat colonoscopy again in 3 years.    Incidentally, inflammatory changes were found in the colon that were felt to be perhaps related to the bowel prep.  Ulcers were noted in the ascending and proximal transverse colon.  The biopsies did not show any specific features that would suggest Crohn's disease or any other specific cause.  Pathology reveals only mildly active nonspecific colitis with associated ulceration and marked reactive changes.    Patient reports that she has been having problems with diarrhea for several months, although she did not report this to Dr. Gottlieb at the time of her colonoscopy.  She states that she simply did not think to mention it to him.  She is having intermittent, urgent diarrhea, this does not occur on a daily basis but may be 3-4 times weekly she will have multiple urgent stools daily.  She has had nocturnal diarrhea on many occasions.  She denies any associated abdominal pain, nausea or vomiting.  She denies weight loss.    PAST MEDICAL HISTORY  Past Medical History:   Diagnosis Date    A-fib     Allergic 1994    Anxiety     Diastolic dysfunction     grade 1 with impaired relaxation    H/O total hysterectomy with  bilateral salpingo-oophorectomy (BSO)     Headache     Hearing loss     History of appendectomy     History of medical problems Sleep apnea,  aFib    History of tonsillectomy     History of tympanoplasty     Hypercholesterolemia     Hypertension     Left atrial dilatation     LVH (left ventricular hypertrophy)     Obesity     Sleep apnea     Tinnitus         PAST SURGICAL HISTORY  Past Surgical History:   Procedure Laterality Date    APPENDECTOMY  1969    COLONOSCOPY      HYSTERECTOMY  1984    INNER EAR SURGERY Left 1997, 2005    OOPHORECTOMY      REDUCTION MAMMAPLASTY      TONSILLECTOMY  1959        MEDICATIONS:    Current Outpatient Medications:     amLODIPine (NORVASC) 10 MG tablet, Take 1 tablet by mouth Daily., Disp: 90 tablet, Rfl: 3    atorvastatin (LIPITOR) 20 MG tablet, Take 1 tablet by mouth Daily., Disp: 90 tablet, Rfl: 3    benazepril-hydrochlorthiazide (LOTENSIN HCT) 20-25 MG per tablet, Take 1 tablet by mouth Daily., Disp: 90 tablet, Rfl: 3    Eliquis 5 MG tablet tablet, TAKE 1 TABLET BY MOUTH TWICE DAILY, Disp: 180 tablet, Rfl: 2    EPINEPHrine (EPIPEN) 0.3 MG/0.3ML solution auto-injector injection, , Disp: , Rfl:     escitalopram (LEXAPRO) 20 MG tablet, Take 1 tablet by mouth Daily., Disp: 90 tablet, Rfl: 3    guaiFENesin (Mucinex) 600 MG 12 hr tablet, Take 1 tablet by mouth 2 (Two) Times a Day., Disp: 20 tablet, Rfl: 0    ipratropium (ATROVENT) 0.03 % nasal spray, 2 sprays into the nostril(s) as directed by provider Every 12 (Twelve) Hours., Disp: , Rfl:     levocetirizine (XYZAL) 5 MG tablet, Take 1 tablet by mouth Every Evening., Disp: 90 tablet, Rfl: 1    Liraglutide (SAXENDA) 18 MG/3ML injection pen, Inject 0.6mg under skin daily for week one, THEN 1.2mg daily for week two, THEN 1.8mg daily for week three, then 2.4mg daily for week four., Disp: 3 mL, Rfl: 0    mometasone (NASONEX) 50 MCG/ACT nasal spray, As Needed., Disp: , Rfl:     Multaq 400 MG tablet, TAKE 1 TABLET BY MOUTH TWICE DAILY WITH  "MEALS, Disp: 180 tablet, Rfl: 2    valACYclovir (Valtrex) 500 MG tablet, Take 1 tablet by mouth 2 (Two) Times a Day., Disp: 30 tablet, Rfl: 0    ALLERGIES  Allergies   Allergen Reactions    Dextroamphetamine Sulfate Hallucinations    Penicillins Unknown (See Comments)     ALLERGY AS A CHILD AND DOESN'T RECALL REACTION       FAMILY HISTORY:  Family History   Problem Relation Age of Onset    Dementia Mother     Hydrocephalus Mother     Ovarian cancer Mother 47    Arthritis Mother     Osteoporosis Mother     Hyperthyroidism Mother     Hypertension Mother     Cancer Mother         Ovarian    Anemia Mother     Hearing loss Mother     Thyroid disease Mother     Vision loss Mother         Glaucoma    Heart valve disorder Father     Diabetes Father     Heart disease Father         Valve issue    Thyroid cancer Brother     Hypertension Brother     Hearing loss Brother     Hypertension Brother     Cancer Brother         Thyroid    Mental illness Brother     Breast cancer Neg Hx        SOCIAL HISTORY  Social History     Socioeconomic History    Marital status:    Tobacco Use    Smoking status: Former     Packs/day: 1.00     Years: 2.00     Pack years: 2.00     Types: Cigarettes     Start date: 1972     Quit date: 1974     Years since quittin.5    Smokeless tobacco: Never   Vaping Use    Vaping Use: Never used   Substance and Sexual Activity    Alcohol use: Yes     Alcohol/week: 1.0 standard drink     Types: 1 Drinks containing 0.5 oz of alcohol per week     Comment: Less than 1 per week    Drug use: No    Sexual activity: Yes     Partners: Male     Birth control/protection: Post-menopausal         PHYSICAL EXAM   /60 (BP Location: Left arm, Patient Position: Sitting, Cuff Size: Adult)   Pulse 75   Temp 97.1 øF (36.2 øC) (Temporal)   Ht 172.7 cm (68\")   Wt 99.4 kg (219 lb 3.2 oz)   BMI 33.33 kg/mý   Physical Exam  Constitutional:       Appearance: Normal appearance.   HENT:      Head: " Normocephalic and atraumatic.   Pulmonary:      Effort: Pulmonary effort is normal.   Neurological:      Mental Status: She is alert and oriented to person, place, and time.   Psychiatric:         Mood and Affect: Mood normal.         Thought Content: Thought content normal.         Results Review:  [facility]  [address] [place] [address]  Phone: [phone] or [phone]  Fax: [phone]  Martinez Parada M.D., Medical Director    PATIENT NAME                           LABORATORY NO.  TEODORO PINO.                [id number]  [id number]                         AGE              SEX  SSN           CLIENT REF #  [facility]           68      1955  F    [id number]   [id number]    [address]              REQUESTING M.D.     ATTENDING [place] [address]                SIMON GARCIA SARAH ADALBERTO  DATE COLLECTED      DATE RECEIVED      DATE REPORTED  2023    DIAGNOSIS:  A.   DESCENDING COLON POLYPS:  Tubular adenoma  No high grade dysplasia identified  B.   ASCENDING COLON POLYP:  Tubular adenoma  No high grade dysplasia identified  C.   COLON, BIOPSY, ULCERATION:  Mildly active nonspecific colitis with associated ulceration and marked  reactive changes, see comment  D.   SIGMOID COLON POLYP:  Tubular adenoma and hyperplastic polyp  No high grade dysplasia identified    COMMENT:  Specimen C: The findings are not entirely specific.  Clinical and  endoscopic correlation is required to determine etiology.  No evidence of  dysplasia, granulomas or viral inclusions.    CLINICAL HISTORY:  Diverticulosis of large intestine without perforation or abscess without bleeding,  benign neoplasm of sigmoid colon, benign neoplasm of descending colon, ulcer  of intestine, encounter for screening for malignant neoplasm of colon    SPECIMENS RECEIVED:  A.  DESCENDING  COLON POLYPS  B.  ASCENDING COLON POLYP  C.  COLON, BIOPSY , ULCERATION  D.  SIGMOID COLON POLYP    MICROSCOPIC DESCRIPTION:  Tissue blocks are prepared and slides are examined microscopically on all  specimens. See diagnosis for details.    Professional interpretation rendered by Cathy Quigley D.O., FELICITAS at  [employer], [place], [address] [place] [address].    GROSS DESCRIPTION:  A.  Labeled descending colon polyp are multiple portions of tan soft tissue  measuring 1.3 x 1.0 x 0.3 cm in aggregate, filtered and submitted entirely  in 1 block.  MTH  B.  Labeled ascending colon polyp are 2 portions of tan soft tissue measuring  0.5 x 0.5 x 0.2 cm in aggregate, submitted entirely in 1 block.  C.  Labeled ulceration area are multiple portions of tan soft tissue measuring  0.7 x 0.5 x 0.2 cm in aggregate, submitted entirely in 1 block.  D.  Labeled as sigmoid colon polyp are multiple portions of tan soft tissue  measuring 0.9 x 0.7 x 0.3 cm in aggregate, submitted entirely in 1 block.    REVIEWED, DIAGNOSED AND ELECTRONICALLY  SIGNED BY:    Cathy Quigley D.O., ROLFP.     ASSESSMENT / PLAN  Diagnoses and all orders for this visit:    1. Diarrhea, unspecified type (Primary)    -Fecal calprotectin  If elevated, plan to treat for Crohn's disease.      Return in about 3 months (around 11/30/2023).    I discussed the patients findings and my recommendations with patient    BECKA Blair

## 2023-09-01 ENCOUNTER — OFFICE VISIT (OUTPATIENT)
Dept: FAMILY MEDICINE CLINIC | Facility: CLINIC | Age: 68
End: 2023-09-01
Payer: MEDICARE

## 2023-09-01 VITALS
OXYGEN SATURATION: 96 % | WEIGHT: 216 LBS | HEIGHT: 68 IN | BODY MASS INDEX: 32.74 KG/M2 | RESPIRATION RATE: 18 BRPM | DIASTOLIC BLOOD PRESSURE: 74 MMHG | HEART RATE: 86 BPM | TEMPERATURE: 97.8 F | SYSTOLIC BLOOD PRESSURE: 136 MMHG

## 2023-09-01 DIAGNOSIS — I10 ESSENTIAL HYPERTENSION: Primary | ICD-10-CM

## 2023-09-01 DIAGNOSIS — E66.9 OBESITY (BMI 30-39.9): ICD-10-CM

## 2023-09-01 DIAGNOSIS — R73.03 PREDIABETES: ICD-10-CM

## 2023-09-01 PROCEDURE — 3078F DIAST BP <80 MM HG: CPT | Performed by: NURSE PRACTITIONER

## 2023-09-01 PROCEDURE — 1160F RVW MEDS BY RX/DR IN RCRD: CPT | Performed by: NURSE PRACTITIONER

## 2023-09-01 PROCEDURE — 99214 OFFICE O/P EST MOD 30 MIN: CPT | Performed by: NURSE PRACTITIONER

## 2023-09-01 PROCEDURE — 3075F SYST BP GE 130 - 139MM HG: CPT | Performed by: NURSE PRACTITIONER

## 2023-09-01 PROCEDURE — 1159F MED LIST DOCD IN RCRD: CPT | Performed by: NURSE PRACTITIONER

## 2023-09-01 NOTE — PATIENT INSTRUCTIONS
Generalized Anxiety Disorder, Adult  Generalized anxiety disorder (SHARDA) is a mental health condition. Unlike normal worries, anxiety related to SHARDA is not triggered by a specific event. These worries do not fade or get better with time. SHARDA interferes with relationships, work, and school.  SHARDA symptoms can vary from mild to severe. People with severe SHARDA can have intense waves of anxiety with physical symptoms that are similar to panic attacks.  What are the causes?  The exact cause of SHARDA is not known, but the following are believed to have an impact:  Differences in natural brain chemicals.  Genes passed down from parents to children.  Differences in the way threats are perceived.  Development and stress during childhood.  Personality.  What increases the risk?  The following factors may make you more likely to develop this condition:  Being female.  Having a family history of anxiety disorders.  Being very shy.  Experiencing very stressful life events, such as the death of a loved one.  Having a very stressful family environment.  What are the signs or symptoms?  People with SHARDA often worry excessively about many things in their lives, such as their health and family. Symptoms may also include:  Mental and emotional symptoms:  Worrying excessively about natural disasters.  Fear of being late.  Difficulty concentrating.  Fears that others are judging your performance.  Physical symptoms:  Fatigue.  Headaches, muscle tension, muscle twitches, trembling, or feeling shaky.  Feeling like your heart is pounding or beating very fast.  Feeling out of breath or like you cannot take a deep breath.  Having trouble falling asleep or staying asleep, or experiencing restlessness.  Sweating.  Nausea, diarrhea, or irritable bowel syndrome (IBS).  Behavioral symptoms:  Experiencing erratic moods or irritability.  Avoidance of new situations.  Avoidance of people.  Extreme difficulty making decisions.  How is this diagnosed?  This  condition is diagnosed based on your symptoms and medical history. You will also have a physical exam. Your health care provider may perform tests to rule out other possible causes of your symptoms.  To be diagnosed with SHARDA, a person must have anxiety that:  Is out of his or her control.  Affects several different aspects of his or her life, such as work and relationships.  Causes distress that makes him or her unable to take part in normal activities.  Includes at least three symptoms of SHARDA, such as restlessness, fatigue, trouble concentrating, irritability, muscle tension, or sleep problems.  Before your health care provider can confirm a diagnosis of SHARDA, these symptoms must be present more days than they are not, and they must last for 6 months or longer.  How is this treated?  This condition may be treated with:  Medicine. Antidepressant medicine is usually prescribed for long-term daily control. Anti-anxiety medicines may be added in severe cases, especially when panic attacks occur.  Talk therapy (psychotherapy). Certain types of talk therapy can be helpful in treating SHARDA by providing support, education, and guidance. Options include:  Cognitive behavioral therapy (CBT). People learn coping skills and self-calming techniques to ease their physical symptoms. They learn to identify unrealistic thoughts and behaviors and to replace them with more appropriate thoughts and behaviors.  Acceptance and commitment therapy (ACT). This treatment teaches people how to be mindful as a way to cope with unwanted thoughts and feelings.  Biofeedback. This process trains you to manage your body's response (physiological response) through breathing techniques and relaxation methods. You will work with a therapist while machines are used to monitor your physical symptoms.  Stress management techniques. These include yoga, meditation, and exercise.  A mental health specialist can help determine which treatment is best for you.  Some people see improvement with one type of therapy. However, other people require a combination of therapies.  Follow these instructions at home:  Lifestyle  Maintain a consistent routine and schedule.  Anticipate stressful situations. Create a plan and allow extra time to work with your plan.  Practice stress management or self-calming techniques that you have learned from your therapist or your health care provider.  Exercise regularly and spend time outdoors.  Eat a healthy diet that includes plenty of vegetables, fruits, whole grains, low-fat dairy products, and lean protein.  Do not eat a lot of foods that are high in fat, added sugar, or salt (sodium).  Drink plenty of water.  Avoid alcohol. Alcohol can increase anxiety.  Avoid caffeine and certain over-the-counter cold medicines. These may make you feel worse. Ask your pharmacist which medicines to avoid.  General instructions  Take over-the-counter and prescription medicines only as told by your health care provider.  Understand that you are likely to have setbacks. Accept this and be kind to yourself as you persist to take better care of yourself.  Anticipate stressful situations. Create a plan and allow extra time to work with your plan.  Recognize and accept your accomplishments, even if you  them as small.  Spend time with people who care about you.  Keep all follow-up visits. This is important.  Where to find more information  [place] of [employer]: www.nimh.nih.gov  Substance Abuse and Mental Health Services: www.samhsa.gov  Contact a health care provider if:  Your symptoms do not get better.  Your symptoms get worse.  You have signs of depression, such as:  A persistently sad or irritable mood.  Loss of enjoyment in activities that used to bring you alex.  Change in weight or eating.  Changes in sleeping habits.  Get help right away if:  You have thoughts about hurting yourself or others.  If you ever feel like you may hurt  "yourself or others, or have thoughts about taking your own life, get help right away. Go to your nearest emergency department or:  Call your local emergency services (131 in the [place]  Call a suicide crisis helpline, such as the National Suicide Prevention Lifeline at 1-208.551.7303 or 538 in the [place] This is open 24 hours a day in the [place]  Text the Crisis Text Line at 379325 (in the [place]  Summary  Generalized anxiety disorder (SHARDA) is a mental health condition that involves worry that is not triggered by a specific event.  People with SHARDA often worry excessively about many things in their lives, such as their health and family.  SHARDA may cause symptoms such as restlessness, trouble concentrating, sleep problems, frequent sweating, nausea, diarrhea, headaches, and trembling or muscle twitching.  A mental health specialist can help determine which treatment is best for you. Some people see improvement with one type of therapy. However, other people require a combination of therapies.  This information is not intended to replace advice given to you by your health care provider. Make sure you discuss any questions you have with your health care provider.  Document Revised: 07/13/2022 Document Reviewed: 04/10/2022  [employer] Patient Education c 2022 [employer] Hypertension, Adult  High blood pressure (hypertension) is when the force of blood pumping through the arteries is too strong. The arteries are the blood vessels that carry blood from the heart throughout the body. Hypertension forces the heart to work harder to pump blood and may cause arteries to become narrow or stiff. Untreated or uncontrolled hypertension can cause a heart attack, heart failure, a stroke, kidney disease, and other problems.  A blood pressure reading consists of a higher number over a lower number. Ideally, your blood pressure should be below 120/80. The first (\"top\") number is called the systolic pressure. It is a measure of the pressure " "in your arteries as your heart beats. The second (\"bottom\") number is called the diastolic pressure. It is a measure of the pressure in your arteries as the heart relaxes.  What are the causes?  The exact cause of this condition is not known. There are some conditions that result in or are related to high blood pressure.  What increases the risk?  Some risk factors for high blood pressure are under your control. The following factors may make you more likely to develop this condition:  Smoking.  Having type 2 diabetes mellitus, high cholesterol, or both.  Not getting enough exercise or physical activity.  Being overweight.  Having too much fat, sugar, calories, or salt (sodium) in your diet.  Drinking too much alcohol.  Some risk factors for high blood pressure may be difficult or impossible to change. Some of these factors include:  Having chronic kidney disease.  Having a family history of high blood pressure.  Age. Risk increases with age.  Race. You may be at higher risk if you are .  Gender. Men are at higher risk than women before age 45. After age 65, women are at higher risk than men.  Having obstructive sleep apnea.  Stress.  What are the signs or symptoms?  High blood pressure may not cause symptoms. Very high blood pressure (hypertensive crisis) may cause:  Headache.  Anxiety.  Shortness of breath.  Nosebleed.  Nausea and vomiting.  Vision changes.  Severe chest pain.  Seizures.  How is this diagnosed?  This condition is diagnosed by measuring your blood pressure while you are seated, with your arm resting on a flat surface, your legs uncrossed, and your feet flat on the floor. The cuff of the blood pressure monitor will be placed directly against the skin of your upper arm at the level of your heart. It should be measured at least twice using the same arm. Certain conditions can cause a difference in blood pressure between your right and left arms.  Certain factors can cause blood " pressure readings to be lower or higher than normal for a short period of time:  When your blood pressure is higher when you are in a health care provider's office than when you are at home, this is called white coat hypertension. Most people with this condition do not need medicines.  When your blood pressure is higher at home than when you are in a health care provider's office, this is called masked hypertension. Most people with this condition may need medicines to control blood pressure.  If you have a high blood pressure reading during one visit or you have normal blood pressure with other risk factors, you may be asked to:  Return on a different day to have your blood pressure checked again.  Monitor your blood pressure at home for 1 week or longer.  If you are diagnosed with hypertension, you may have other blood or imaging tests to help your health care provider understand your overall risk for other conditions.  How is this treated?  This condition is treated by making healthy lifestyle changes, such as eating healthy foods, exercising more, and reducing your alcohol intake. Your health care provider may prescribe medicine if lifestyle changes are not enough to get your blood pressure under control, and if:  Your systolic blood pressure is above 130.  Your diastolic blood pressure is above 80.  Your personal target blood pressure may vary depending on your medical conditions, your age, and other factors.  Follow these instructions at home:  Eating and drinking    Eat a diet that is high in fiber and potassium, and low in sodium, added sugar, and fat. An example eating plan is called the DASH (Dietary Approaches to Stop Hypertension) diet. To eat this way:  Eat plenty of fresh fruits and vegetables. Try to fill one half of your plate at each meal with fruits and vegetables.  Eat whole grains, such as whole-wheat pasta, brown rice, or whole-grain bread. Fill about one fourth of your plate with whole  grains.  Eat or drink low-fat dairy products, such as skim milk or low-fat yogurt.  Avoid fatty cuts of meat, processed or cured meats, and poultry with skin. Fill about one fourth of your plate with lean proteins, such as fish, chicken without skin, beans, eggs, or tofu.  Avoid pre-made and processed foods. These tend to be higher in sodium, added sugar, and fat.  Reduce your daily sodium intake. Most people with hypertension should eat less than 1,500 mg of sodium a day.  Do not drink alcohol if:  Your health care provider tells you not to drink.  You are pregnant, may be pregnant, or are planning to become pregnant.  If you drink alcohol:  Limit how much you use to:  0-1 drink a day for women.  0-2 drinks a day for men.  Be aware of how much alcohol is in your drink. In the [place] one drink equals one 12 oz bottle of beer (355 mL), one 5 oz glass of wine (148 mL), or one 1« oz glass of hard liquor (44 mL).  Lifestyle    Work with your health care provider to maintain a healthy body weight or to lose weight. Ask what an ideal weight is for you.  Get at least 30 minutes of exercise most days of the week. Activities may include walking, swimming, or biking.  Include exercise to strengthen your muscles (resistance exercise), such as Pilates or lifting weights, as part of your weekly exercise routine. Try to do these types of exercises for 30 minutes at least 3 days a week.  Do not use any products that contain nicotine or tobacco, such as cigarettes, e-cigarettes, and chewing tobacco. If you need help quitting, ask your health care provider.  Monitor your blood pressure at home as told by your health care provider.  Keep all follow-up visits as told by your health care provider. This is important.  Medicines  Take over-the-counter and prescription medicines only as told by your health care provider. Follow directions carefully. Blood pressure medicines must be taken as prescribed.  Do not skip doses of blood pressure  medicine. Doing this puts you at risk for problems and can make the medicine less effective.  Ask your health care provider about side effects or reactions to medicines that you should watch for.  Contact a health care provider if you:  Think you are having a reaction to a medicine you are taking.  Have headaches that keep coming back (recurring).  Feel dizzy.  Have swelling in your ankles.  Have trouble with your vision.  Get help right away if you:  Develop a severe headache or confusion.  Have unusual weakness or numbness.  Feel faint.  Have severe pain in your chest or abdomen.  Vomit repeatedly.  Have trouble breathing.  Summary  Hypertension is when the force of blood pumping through your arteries is too strong. If this condition is not controlled, it may put you at risk for serious complications.  Your personal target blood pressure may vary depending on your medical conditions, your age, and other factors. For most people, a normal blood pressure is less than 120/80.  Hypertension is treated with lifestyle changes, medicines, or a combination of both. Lifestyle changes include losing weight, eating a healthy, low-sodium diet, exercising more, and limiting alcohol.  This information is not intended to replace advice given to you by your health care provider. Make sure you discuss any questions you have with your health care provider.  Document Revised: 08/28/2019 Document Reviewed: 08/28/2019  [employer] Patient Education c 2022 [employer]

## 2023-09-01 NOTE — PROGRESS NOTES
Chief Complaint  Follow-up (3 Month follow up on Obesity, Hypertension, SHARDA, Arthritis)    Subjective        Stefani Severino presents to [facility] for follow up (htn; obesity; anxiety; arthritis).    Hypertension  This is a chronic problem. The current episode started more than 1 year ago. The problem is unchanged. The problem is controlled. Associated symptoms include anxiety. Pertinent negatives include no blurred vision, chest pain, headaches, malaise/fatigue, neck pain, orthopnea, palpitations, peripheral edema, PND, shortness of breath or sweats. Risk factors for coronary artery disease include dyslipidemia, obesity, post-menopausal state and stress. Past treatments include ACE inhibitors, diuretics and calcium channel blockers. Current antihypertension treatment includes ACE inhibitors, diuretics and calcium channel blockers. The current treatment provides moderate improvement. Compliance problems include diet, exercise and psychosocial issues.    Obesity  This is a chronic problem. The current episode started more than 1 year ago. The problem has been gradually improving. Associated symptoms include a change in bowel habit. Pertinent negatives include no abdominal pain, anorexia, arthralgias, chest pain, chills, congestion, coughing, diaphoresis, fatigue, fever, headaches, joint swelling, myalgias, nausea, neck pain, numbness, rash, sore throat, swollen glands, urinary symptoms, vertigo, visual change, vomiting or weakness. The symptoms are aggravated by stress. Treatments tried: pt is utilizing weight loss clinic for semiglutide. The treatment provided mild relief.   Anxiety  Presents for follow-up visit. Symptoms include depressed mood and nervous/anxious behavior. Patient reports no chest pain, compulsions, confusion, decreased concentration, dizziness, dry mouth, excessive worry, feeling of choking, hyperventilation, impotence, insomnia, irritability, malaise, muscle  "tension, nausea, obsessions, palpitations, panic, restlessness, shortness of breath or suicidal ideas. Symptoms occur occasionally. The severity of symptoms is mild. The quality of sleep is good. Nighttime awakenings: occasional.     Compliance with medications is %.     Objective   Vital Signs:  /74 (BP Location: Right arm, Patient Position: Sitting, Cuff Size: Adult)   Pulse 86   Temp 97.8 øF (36.6 øC) (Temporal)   Resp 18   Ht 172.7 cm (68\")   Wt 98 kg (216 lb)   SpO2 96%   BMI 32.84 kg/mý   Estimated body mass index is 32.84 kg/mý as calculated from the following:    Height as of this encounter: 172.7 cm (68\").    Weight as of this encounter: 98 kg (216 lb).       Physical Exam  Vitals and nursing note reviewed.   Constitutional:       General: She is awake.      Appearance: Normal appearance. She is obese.   HENT:      Head: Normocephalic.      Right Ear: Hearing and external ear normal.      Left Ear: Hearing and external ear normal.      Nose: Nose normal.      Mouth/Throat:      Lips: Pink.      Mouth: Mucous membranes are moist.   Eyes:      General: Lids are normal.      Conjunctiva/sclera: Conjunctivae normal.      Pupils: Pupils are equal, round, and reactive to light.   Cardiovascular:      Rate and Rhythm: Normal rate and regular rhythm.      Heart sounds: Normal heart sounds.   Pulmonary:      Effort: Pulmonary effort is normal.      Breath sounds: Normal breath sounds.   Abdominal:      General: Abdomen is protuberant. Bowel sounds are normal.      Palpations: Abdomen is soft.      Tenderness: There is no abdominal tenderness.   Musculoskeletal:         General: Normal range of motion.      Cervical back: Normal range of motion.   Skin:     General: Skin is warm and dry.      Capillary Refill: Capillary refill takes less than 2 seconds.   Neurological:      Mental Status: She is alert and oriented to person, place, and time.      Sensory: Sensation is intact.      Motor: Motor " function is intact.      Coordination: Coordination is intact.      Gait: Gait is intact.   Psychiatric:         Attention and Perception: Attention and perception normal.         Mood and Affect: Affect normal. Mood is anxious.         Speech: Speech normal.         Behavior: Behavior normal. Behavior is cooperative.         Thought Content: Thought content normal.         Cognition and Memory: Cognition normal.         Judgment: Judgment normal.        Result Review :  The following data was reviewed by: BECKA Bueno on 09/01/2023:  CMP          3/1/2023    09:06   CMP   Glucose 105    BUN 17    Creatinine 1.06    EGFR 56.6    Sodium 139    Potassium 3.6    Chloride 99    Calcium 9.5    Total Protein 7.4    Albumin 4.6    Globulin 2.8    Total Bilirubin 0.4    Alkaline Phosphatase 63    AST (SGOT) 26    ALT (SGPT) 37    Albumin/Globulin Ratio 1.6    BUN/Creatinine Ratio 16.0    Anion Gap 14.5      CBC w/diff          3/1/2023    09:06   CBC w/Diff   WBC 5.33    RBC 3.78    Hemoglobin 12.8    Hematocrit 38.3    .3    MCH 33.9    MCHC 33.4    RDW 12.9    Platelets 314    Neutrophil Rel % 59.1    Immature Granulocyte Rel % 0.4    Lymphocyte Rel % 24.2    Monocyte Rel % 12.0    Eosinophil Rel % 3.2    Basophil Rel % 1.1      Lipid Panel          3/1/2023    09:06   Lipid Panel   Total Cholesterol 187    Triglycerides 136    HDL Cholesterol 77    VLDL Cholesterol 23    LDL Cholesterol  87    LDL/HDL Ratio 1.08      TSH          3/1/2023    09:06   TSH   TSH 2.440        Assessment and Plan   Diagnoses and all orders for this visit:    1. Essential hypertension (Primary)  Assessment & Plan:  Hypertension is unchanged.  Continue current treatment regimen.  Blood pressure will be reassessed at the next regular appointment.    Orders:  -     CBC w AUTO Differential; Future  -     Comprehensive metabolic panel; Future  -     TSH; Future  -     Lipid panel; Future  -     Lipid panel  -     TSH  -      Comprehensive metabolic panel  -     CBC w AUTO Differential    2. Obesity (BMI 30-39.9)  Comments:  pt going to weight loss clinic; semiglutide    3. Prediabetes  -     Hemoglobin A1c; Future  -     Hemoglobin A1c         I spent 30 minutes caring for Stefani on this date of service. This time includes time spent by me in the following activities:preparing for the visit, reviewing tests, obtaining and/or reviewing a separately obtained history, performing a medically appropriate examination and/or evaluation , counseling and educating the patient/family/caregiver, ordering medications, tests, or procedures, referring and communicating with other health care professionals , documenting information in the medical record, independently interpreting results and communicating that information with the patient/family/caregiver, and care coordination    Follow Up   Return in about 6 months (around 3/1/2024).  Patient was given instructions and counseling regarding her condition or for health maintenance advice. Please see specific information pulled into the AVS if appropriate.       This document has been electronically signed by BECKA Bueno  September 1, 2023 10:52 EDT

## 2023-09-07 ENCOUNTER — LAB (OUTPATIENT)
Dept: FAMILY MEDICINE CLINIC | Facility: CLINIC | Age: 68
End: 2023-09-07
Payer: MEDICARE

## 2023-09-07 LAB
ALBUMIN SERPL-MCNC: 4.6 G/DL (ref 3.5–5.2)
ALBUMIN/GLOB SERPL: 2 G/DL
ALP SERPL-CCNC: 50 U/L (ref 39–117)
ALT SERPL W P-5'-P-CCNC: 25 U/L (ref 1–33)
ANION GAP SERPL CALCULATED.3IONS-SCNC: 11.6 MMOL/L (ref 5–15)
AST SERPL-CCNC: 27 U/L (ref 1–32)
BASOPHILS # BLD AUTO: 0.06 10*3/MM3 (ref 0–0.2)
BASOPHILS NFR BLD AUTO: 1.3 % (ref 0–1.5)
BILIRUB SERPL-MCNC: 0.6 MG/DL (ref 0–1.2)
BUN SERPL-MCNC: 14 MG/DL (ref 8–23)
BUN/CREAT SERPL: 14.6 (ref 7–25)
CALCIUM SPEC-SCNC: 9.4 MG/DL (ref 8.6–10.5)
CHLORIDE SERPL-SCNC: 99 MMOL/L (ref 98–107)
CHOLEST SERPL-MCNC: 161 MG/DL (ref 0–200)
CO2 SERPL-SCNC: 25.4 MMOL/L (ref 22–29)
CREAT SERPL-MCNC: 0.96 MG/DL (ref 0.57–1)
DEPRECATED RDW RBC AUTO: 46 FL (ref 37–54)
EGFRCR SERPLBLD CKD-EPI 2021: 63.8 ML/MIN/1.73
EOSINOPHIL # BLD AUTO: 0.14 10*3/MM3 (ref 0–0.4)
EOSINOPHIL NFR BLD AUTO: 2.9 % (ref 0.3–6.2)
ERYTHROCYTE [DISTWIDTH] IN BLOOD BY AUTOMATED COUNT: 12.8 % (ref 12.3–15.4)
GLOBULIN UR ELPH-MCNC: 2.3 GM/DL
GLUCOSE SERPL-MCNC: 86 MG/DL (ref 65–99)
HBA1C MFR BLD: 5.5 % (ref 4.8–5.6)
HCT VFR BLD AUTO: 36.9 % (ref 34–46.6)
HDLC SERPL-MCNC: 67 MG/DL (ref 40–60)
HGB BLD-MCNC: 12.2 G/DL (ref 12–15.9)
IMM GRANULOCYTES # BLD AUTO: 0.01 10*3/MM3 (ref 0–0.05)
IMM GRANULOCYTES NFR BLD AUTO: 0.2 % (ref 0–0.5)
LDLC SERPL CALC-MCNC: 69 MG/DL (ref 0–100)
LDLC/HDLC SERPL: 0.97 {RATIO}
LYMPHOCYTES # BLD AUTO: 1.21 10*3/MM3 (ref 0.7–3.1)
LYMPHOCYTES NFR BLD AUTO: 25.2 % (ref 19.6–45.3)
MCH RBC QN AUTO: 32.6 PG (ref 26.6–33)
MCHC RBC AUTO-ENTMCNC: 33.1 G/DL (ref 31.5–35.7)
MCV RBC AUTO: 98.7 FL (ref 79–97)
MONOCYTES # BLD AUTO: 0.52 10*3/MM3 (ref 0.1–0.9)
MONOCYTES NFR BLD AUTO: 10.8 % (ref 5–12)
NEUTROPHILS NFR BLD AUTO: 2.86 10*3/MM3 (ref 1.7–7)
NEUTROPHILS NFR BLD AUTO: 59.6 % (ref 42.7–76)
NRBC BLD AUTO-RTO: 0 /100 WBC (ref 0–0.2)
PLATELET # BLD AUTO: 327 10*3/MM3 (ref 140–450)
PMV BLD AUTO: 10.5 FL (ref 6–12)
POTASSIUM SERPL-SCNC: 3.6 MMOL/L (ref 3.5–5.2)
PROT SERPL-MCNC: 6.9 G/DL (ref 6–8.5)
RBC # BLD AUTO: 3.74 10*6/MM3 (ref 3.77–5.28)
SODIUM SERPL-SCNC: 136 MMOL/L (ref 136–145)
TRIGL SERPL-MCNC: 146 MG/DL (ref 0–150)
TSH SERPL DL<=0.05 MIU/L-ACNC: 2.59 UIU/ML (ref 0.27–4.2)
VLDLC SERPL-MCNC: 25 MG/DL (ref 5–40)
WBC NRBC COR # BLD: 4.8 10*3/MM3 (ref 3.4–10.8)

## 2023-09-07 PROCEDURE — 80053 COMPREHEN METABOLIC PANEL: CPT | Performed by: NURSE PRACTITIONER

## 2023-09-07 PROCEDURE — 84443 ASSAY THYROID STIM HORMONE: CPT | Performed by: NURSE PRACTITIONER

## 2023-09-07 PROCEDURE — 80061 LIPID PANEL: CPT | Performed by: NURSE PRACTITIONER

## 2023-09-07 PROCEDURE — 85025 COMPLETE CBC W/AUTO DIFF WBC: CPT | Performed by: NURSE PRACTITIONER

## 2023-09-07 PROCEDURE — 83036 HEMOGLOBIN GLYCOSYLATED A1C: CPT | Performed by: NURSE PRACTITIONER

## 2023-10-13 ENCOUNTER — DOCUMENTATION (OUTPATIENT)
Dept: GASTROENTEROLOGY | Facility: CLINIC | Age: 68
End: 2023-10-13
Payer: MEDICARE

## 2023-10-13 NOTE — PROGRESS NOTES
"Fecal calprotectin results received/reviewed and called to discussed with patient.    Patient had undergone a screening colonoscopy where incidentally, inflammatory changes were found in the colon that were felt to be perhaps related to the bowel prep.  Ulcers were noted in the ascending and proximal transverse colon.  The biopsies did not show any specific features that would suggest Crohn's disease or any other specific cause.  Pathology reveals only mildly active nonspecific colitis with associated ulceration and marked reactive changes.    Subsequently, fecal calprotectin was obtained and is noted to be normal; 17.3 ug/g.    Patient reports diarrhea has resolved.  She was previously having problems with diarrhea several months before colonoscopy, have not intermittent, urgent diarrhea 3-4 times weekly as well as several occasions of nocturnal diarrhea.  Currently however, she states she has had no further troubles with diarrhea since early September.  She is having a soft formed bowel movement daily stating \"my stools are normal\".    No further intervention or work-up at this time.  She is going to follow-up with me as needed if her diarrhea or other GI complaints occur.  "

## 2024-01-15 ENCOUNTER — PRIOR AUTHORIZATION (OUTPATIENT)
Dept: FAMILY MEDICINE CLINIC | Facility: CLINIC | Age: 69
End: 2024-01-15
Payer: MEDICARE

## 2024-01-15 NOTE — TELEPHONE ENCOUNTER
Prior authorization request received for Saxenda 18MG/3ML pen-injectors. Authorization submitted through cover my med's. Status pending.

## 2024-01-18 NOTE — TELEPHONE ENCOUNTER
TEODORO VILLANUEVA (Key: [id number])  Saxenda 18MG/3ML pen-injectors  Form  [employer] Electronic [employer] Form (2017 NCPDP)  Created  5 days ago  Sent to Plan  3 days ago  Plan Response  3 days ago  Submit Clinical Questions  3 days ago  Determination  Unfavorable  2 days ago    Your prior authorization request has been denied.

## 2024-02-06 ENCOUNTER — TELEPHONE (OUTPATIENT)
Dept: CARDIOLOGY | Facility: CLINIC | Age: 69
End: 2024-02-06
Payer: MEDICARE

## 2024-02-06 NOTE — TELEPHONE ENCOUNTER
Caller: Stefani Severino    Relationship: Self    Best call back number: [phone]    Who is your current provider:     Is your current provider offboarding? ON    Who would you like your new provider to be:     What are your reasons for transferring care: PT WOULD LIKE ANOTHER OPINION    Additional notes: SHE HAS BEEN FEELING A FLUTTERING IN HER CHEST THAT COMES AND GOES, THIS HAS BEEN GOING ON FOR A WEEK, MAYBE 10 DAYS NOW.

## 2024-02-15 ENCOUNTER — OFFICE VISIT (OUTPATIENT)
Dept: CARDIOLOGY | Facility: CLINIC | Age: 69
End: 2024-02-15
Payer: MEDICARE

## 2024-02-15 ENCOUNTER — PATIENT MESSAGE (OUTPATIENT)
Dept: CARDIOLOGY | Facility: CLINIC | Age: 69
End: 2024-02-15

## 2024-02-15 VITALS
HEART RATE: 58 BPM | BODY MASS INDEX: 30.62 KG/M2 | SYSTOLIC BLOOD PRESSURE: 124 MMHG | WEIGHT: 202 LBS | OXYGEN SATURATION: 98 % | DIASTOLIC BLOOD PRESSURE: 66 MMHG | HEIGHT: 68 IN

## 2024-02-15 DIAGNOSIS — I10 ESSENTIAL HYPERTENSION: Chronic | ICD-10-CM

## 2024-02-15 DIAGNOSIS — Z79.899 LONG TERM CURRENT USE OF ANTIARRHYTHMIC MEDICAL THERAPY: ICD-10-CM

## 2024-02-15 DIAGNOSIS — I48.0 PAROXYSMAL ATRIAL FIBRILLATION: Primary | Chronic | ICD-10-CM

## 2024-02-15 PROBLEM — I48.19 PERSISTENT ATRIAL FIBRILLATION: Status: RESOLVED | Noted: 2019-02-12 | Resolved: 2024-02-15

## 2024-02-15 RX ORDER — APIXABAN 5 MG/1
TABLET, FILM COATED ORAL
Qty: 180 TABLET | Refills: 2 | Status: SHIPPED | OUTPATIENT
Start: 2024-02-15

## 2024-02-15 RX ORDER — DRONEDARONE 400 MG/1
TABLET, FILM COATED ORAL
Qty: 180 TABLET | Refills: 2 | Status: SHIPPED | OUTPATIENT
Start: 2024-02-15

## 2024-02-21 ENCOUNTER — OFFICE VISIT (OUTPATIENT)
Dept: FAMILY MEDICINE CLINIC | Facility: CLINIC | Age: 69
End: 2024-02-21
Payer: MEDICARE

## 2024-02-21 VITALS
HEIGHT: 68 IN | HEART RATE: 66 BPM | WEIGHT: 197 LBS | BODY MASS INDEX: 29.86 KG/M2 | SYSTOLIC BLOOD PRESSURE: 118 MMHG | OXYGEN SATURATION: 96 % | TEMPERATURE: 97.6 F | DIASTOLIC BLOOD PRESSURE: 64 MMHG | RESPIRATION RATE: 18 BRPM

## 2024-02-21 DIAGNOSIS — R53.83 FATIGUE, UNSPECIFIED TYPE: ICD-10-CM

## 2024-02-21 DIAGNOSIS — E55.9 VITAMIN D DEFICIENCY: ICD-10-CM

## 2024-02-21 DIAGNOSIS — R73.03 PREDIABETES: ICD-10-CM

## 2024-02-21 DIAGNOSIS — I10 ESSENTIAL HYPERTENSION: Primary | ICD-10-CM

## 2024-02-21 DIAGNOSIS — B00.1 COLD SORE: ICD-10-CM

## 2024-02-21 DIAGNOSIS — E66.9 OBESITY (BMI 30-39.9): ICD-10-CM

## 2024-02-21 LAB
BASOPHILS # BLD AUTO: 0.07 10*3/MM3 (ref 0–0.2)
BASOPHILS NFR BLD AUTO: 1.1 % (ref 0–1.5)
DEPRECATED RDW RBC AUTO: 43.9 FL (ref 37–54)
EOSINOPHIL # BLD AUTO: 0.1 10*3/MM3 (ref 0–0.4)
EOSINOPHIL NFR BLD AUTO: 1.6 % (ref 0.3–6.2)
ERYTHROCYTE [DISTWIDTH] IN BLOOD BY AUTOMATED COUNT: 12.1 % (ref 12.3–15.4)
HBA1C MFR BLD: 5.1 % (ref 4.8–5.6)
HCT VFR BLD AUTO: 37.6 % (ref 34–46.6)
HGB BLD-MCNC: 12.6 G/DL (ref 12–15.9)
IMM GRANULOCYTES # BLD AUTO: 0.01 10*3/MM3 (ref 0–0.05)
IMM GRANULOCYTES NFR BLD AUTO: 0.2 % (ref 0–0.5)
LYMPHOCYTES # BLD AUTO: 1.53 10*3/MM3 (ref 0.7–3.1)
LYMPHOCYTES NFR BLD AUTO: 23.8 % (ref 19.6–45.3)
MCH RBC QN AUTO: 33.4 PG (ref 26.6–33)
MCHC RBC AUTO-ENTMCNC: 33.5 G/DL (ref 31.5–35.7)
MCV RBC AUTO: 99.7 FL (ref 79–97)
MONOCYTES # BLD AUTO: 0.62 10*3/MM3 (ref 0.1–0.9)
MONOCYTES NFR BLD AUTO: 9.6 % (ref 5–12)
NEUTROPHILS NFR BLD AUTO: 4.1 10*3/MM3 (ref 1.7–7)
NEUTROPHILS NFR BLD AUTO: 63.7 % (ref 42.7–76)
NRBC BLD AUTO-RTO: 0 /100 WBC (ref 0–0.2)
PLATELET # BLD AUTO: 321 10*3/MM3 (ref 140–450)
PMV BLD AUTO: 10.4 FL (ref 6–12)
RBC # BLD AUTO: 3.77 10*6/MM3 (ref 3.77–5.28)
WBC NRBC COR # BLD AUTO: 6.43 10*3/MM3 (ref 3.4–10.8)

## 2024-02-21 PROCEDURE — 80061 LIPID PANEL: CPT | Performed by: NURSE PRACTITIONER

## 2024-02-21 PROCEDURE — 1160F RVW MEDS BY RX/DR IN RCRD: CPT | Performed by: NURSE PRACTITIONER

## 2024-02-21 PROCEDURE — 3074F SYST BP LT 130 MM HG: CPT | Performed by: NURSE PRACTITIONER

## 2024-02-21 PROCEDURE — 85025 COMPLETE CBC W/AUTO DIFF WBC: CPT | Performed by: NURSE PRACTITIONER

## 2024-02-21 PROCEDURE — 82607 VITAMIN B-12: CPT | Performed by: NURSE PRACTITIONER

## 2024-02-21 PROCEDURE — 82306 VITAMIN D 25 HYDROXY: CPT | Performed by: NURSE PRACTITIONER

## 2024-02-21 PROCEDURE — 1159F MED LIST DOCD IN RCRD: CPT | Performed by: NURSE PRACTITIONER

## 2024-02-21 PROCEDURE — 99214 OFFICE O/P EST MOD 30 MIN: CPT | Performed by: NURSE PRACTITIONER

## 2024-02-21 PROCEDURE — 84443 ASSAY THYROID STIM HORMONE: CPT | Performed by: NURSE PRACTITIONER

## 2024-02-21 PROCEDURE — 3078F DIAST BP <80 MM HG: CPT | Performed by: NURSE PRACTITIONER

## 2024-02-21 PROCEDURE — 83036 HEMOGLOBIN GLYCOSYLATED A1C: CPT | Performed by: NURSE PRACTITIONER

## 2024-02-21 PROCEDURE — 80053 COMPREHEN METABOLIC PANEL: CPT | Performed by: NURSE PRACTITIONER

## 2024-02-21 RX ORDER — PENCICLOVIR 10 MG/G
1 CREAM TOPICAL
Qty: 5 G | Refills: 0 | Status: SHIPPED | OUTPATIENT
Start: 2024-02-21

## 2024-02-21 RX ORDER — VALACYCLOVIR HYDROCHLORIDE 500 MG/1
500 TABLET, FILM COATED ORAL 2 TIMES DAILY
Qty: 60 TABLET | Refills: 0 | Status: SHIPPED | OUTPATIENT
Start: 2024-02-21

## 2024-02-21 NOTE — PROGRESS NOTES
"Chief Complaint  Follow-up (6 Month follow up on Hypertension and anxiety/)    Subjective        Stefani Severino presents to [facility] for follow up (htn and anxiety).    Hypertension  This is a chronic problem. The current episode started more than 1 year ago. Associated symptoms include anxiety, headaches and malaise/fatigue. Pertinent negatives include no blurred vision, chest pain, neck pain, orthopnea, palpitations, peripheral edema, PND, shortness of breath or sweats. Risk factors for coronary artery disease include dyslipidemia, sedentary lifestyle, stress and post-menopausal state. Past treatments include angiotensin blockers, calcium channel blockers and diuretics. Current antihypertension treatment includes angiotensin blockers, calcium channel blockers and diuretics. Compliance problems include diet, exercise and psychosocial issues.    Anxiety  Presents for follow-up visit. Symptoms include depressed mood and nervous/anxious behavior. Patient reports no chest pain, compulsions, confusion, decreased concentration, dizziness, dry mouth, excessive worry, feeling of choking, hyperventilation, impotence, insomnia, irritability, malaise, muscle tension, nausea, obsessions, palpitations, panic, restlessness, shortness of breath or suicidal ideas. Symptoms occur occasionally. The severity of symptoms is mild. The quality of sleep is good. Nighttime awakenings: occasional.     Compliance with medications is %.     Objective   Vital Signs:  /64   Pulse 66   Temp 97.6 °F (36.4 °C) (Temporal)   Resp 18   Ht 172.7 cm (68\")   Wt 89.4 kg (197 lb)   SpO2 96%   BMI 29.95 kg/m²   Estimated body mass index is 29.95 kg/m² as calculated from the following:    Height as of this encounter: 172.7 cm (68\").    Weight as of this encounter: 89.4 kg (197 lb).       Physical Exam  Vitals and nursing note reviewed.   Constitutional:       General: She is awake.      Appearance: " Normal appearance. She is overweight.   HENT:      Head: Normocephalic.      Right Ear: Hearing and external ear normal.      Left Ear: Hearing and external ear normal.      Nose: Nose normal.      Mouth/Throat:      Lips: Pink.      Mouth: Mucous membranes are moist.   Eyes:      General: Lids are normal.      Conjunctiva/sclera: Conjunctivae normal.      Pupils: Pupils are equal, round, and reactive to light.   Cardiovascular:      Rate and Rhythm: Normal rate and regular rhythm.      Heart sounds: Normal heart sounds.   Pulmonary:      Effort: Pulmonary effort is normal.      Breath sounds: Normal breath sounds.   Abdominal:      General: Abdomen is protuberant.   Musculoskeletal:         General: Normal range of motion.      Cervical back: Normal range of motion.   Skin:     General: Skin is warm and dry.      Capillary Refill: Capillary refill takes less than 2 seconds.   Neurological:      Mental Status: She is alert and oriented to person, place, and time.      Sensory: Sensation is intact.      Motor: Motor function is intact.      Coordination: Coordination is intact.      Gait: Gait is intact.   Psychiatric:         Attention and Perception: Attention and perception normal.         Mood and Affect: Affect normal. Mood is anxious.         Speech: Speech normal.         Behavior: Behavior normal. Behavior is cooperative.         Thought Content: Thought content normal.         Cognition and Memory: Cognition normal.         Judgment: Judgment normal.        Result Review :    The following data was reviewed by: BECKA Bueno on 02/21/2024:  CMP          3/1/2023    09:06 9/7/2023    09:15   CMP   Glucose 105  86    BUN 17  14    Creatinine 1.06  0.96    EGFR 56.6  63.8    Sodium 139  136    Potassium 3.6  3.6    Chloride 99  99    Calcium 9.5  9.4    Total Protein 7.4  6.9    Albumin 4.6  4.6    Globulin 2.8  2.3    Total Bilirubin 0.4  0.6    Alkaline Phosphatase 63  50    AST (SGOT) 26  27    ALT  (SGPT) 37  25    Albumin/Globulin Ratio 1.6  2.0    BUN/Creatinine Ratio 16.0  14.6    Anion Gap 14.5  11.6      CBC w/diff          3/1/2023    09:06 9/7/2023    09:15 2/21/2024    08:53   CBC w/Diff   WBC 5.33  4.80  6.43    RBC 3.78  3.74  3.77    Hemoglobin 12.8  12.2  12.6    Hematocrit 38.3  36.9  37.6    .3  98.7  99.7    MCH 33.9  32.6  33.4    MCHC 33.4  33.1  33.5    RDW 12.9  12.8  12.1    Platelets 314  327  321    Neutrophil Rel % 59.1  59.6  63.7    Immature Granulocyte Rel % 0.4  0.2  0.2    Lymphocyte Rel % 24.2  25.2  23.8    Monocyte Rel % 12.0  10.8  9.6    Eosinophil Rel % 3.2  2.9  1.6    Basophil Rel % 1.1  1.3  1.1      Lipid Panel          3/1/2023    09:06 9/7/2023    09:15   Lipid Panel   Total Cholesterol 187  161    Triglycerides 136  146    HDL Cholesterol 77  67    VLDL Cholesterol 23  25    LDL Cholesterol  87  69    LDL/HDL Ratio 1.08  0.97      TSH          3/1/2023    09:06 9/7/2023    09:15   TSH   TSH 2.440  2.590      Assessment and Plan     Diagnoses and all orders for this visit:    1. Essential hypertension (Primary)  -     CBC w AUTO Differential  -     Comprehensive metabolic panel  -     Lipid panel  -     TSH    2. Obesity (BMI 30-39.9)  -     Lipid panel  -     Hemoglobin A1c    3. Fatigue, unspecified type  -     CBC w AUTO Differential  -     TSH  -     Vitamin B12    4. Vitamin D deficiency  -     Vitamin D,25-Hydroxy    5. Prediabetes  -     Comprehensive metabolic panel  -     Hemoglobin A1c    6. Cold sore  -     valACYclovir (Valtrex) 500 MG tablet; Take 1 tablet by mouth 2 (Two) Times a Day.  Dispense: 60 tablet; Refill: 0  -     penciclovir (Denavir) 1 % cream; Apply 1 Application topically to the appropriate area as directed Every 2 (Two) Hours.  Dispense: 5 g; Refill: 0         I spent 30 minutes caring for Stefani on this date of service. This time includes time spent by me in the following activities:preparing for the visit, reviewing tests, obtaining  and/or reviewing a separately obtained history, performing a medically appropriate examination and/or evaluation , counseling and educating the patient/family/caregiver, ordering medications, tests, or procedures, documenting information in the medical record, and independently interpreting results and communicating that information with the patient/family/caregiver    Follow Up     Return in about 4 months (around 6/30/2024) for Medicare Wellness.  Patient was given instructions and counseling regarding her condition or for health maintenance advice. Please see specific information pulled into the AVS if appropriate.         This document has been electronically signed by BECKA Bueno  February 21, 2024 19:21 EST

## 2024-02-22 DIAGNOSIS — E78.2 MIXED HYPERLIPIDEMIA: ICD-10-CM

## 2024-02-22 LAB
25(OH)D3 SERPL-MCNC: 46.4 NG/ML (ref 30–100)
ALBUMIN SERPL-MCNC: 4.5 G/DL (ref 3.5–5.2)
ALBUMIN/GLOB SERPL: 1.9 G/DL
ALP SERPL-CCNC: 45 U/L (ref 39–117)
ALT SERPL W P-5'-P-CCNC: 15 U/L (ref 1–33)
ANION GAP SERPL CALCULATED.3IONS-SCNC: 14.7 MMOL/L (ref 5–15)
AST SERPL-CCNC: 19 U/L (ref 1–32)
BILIRUB SERPL-MCNC: 0.6 MG/DL (ref 0–1.2)
BUN SERPL-MCNC: 14 MG/DL (ref 8–23)
BUN/CREAT SERPL: 13.9 (ref 7–25)
CALCIUM SPEC-SCNC: 9.3 MG/DL (ref 8.6–10.5)
CHLORIDE SERPL-SCNC: 99 MMOL/L (ref 98–107)
CHOLEST SERPL-MCNC: 156 MG/DL (ref 0–200)
CO2 SERPL-SCNC: 27.3 MMOL/L (ref 22–29)
CREAT SERPL-MCNC: 1.01 MG/DL (ref 0.57–1)
EGFRCR SERPLBLD CKD-EPI 2021: 60 ML/MIN/1.73
GLOBULIN UR ELPH-MCNC: 2.4 GM/DL
GLUCOSE SERPL-MCNC: 81 MG/DL (ref 65–99)
HDLC SERPL-MCNC: 69 MG/DL (ref 40–60)
LDLC SERPL CALC-MCNC: 70 MG/DL (ref 0–100)
LDLC/HDLC SERPL: 0.98 {RATIO}
POTASSIUM SERPL-SCNC: 4 MMOL/L (ref 3.5–5.2)
PROT SERPL-MCNC: 6.9 G/DL (ref 6–8.5)
SODIUM SERPL-SCNC: 141 MMOL/L (ref 136–145)
TRIGL SERPL-MCNC: 96 MG/DL (ref 0–150)
TSH SERPL DL<=0.05 MIU/L-ACNC: 1.73 UIU/ML (ref 0.27–4.2)
VIT B12 BLD-MCNC: >2000 PG/ML (ref 211–946)
VLDLC SERPL-MCNC: 17 MG/DL (ref 5–40)

## 2024-02-22 RX ORDER — ATORVASTATIN CALCIUM 20 MG/1
20 TABLET, FILM COATED ORAL DAILY
Qty: 90 TABLET | Refills: 0 | Status: SHIPPED | OUTPATIENT
Start: 2024-02-22

## 2024-02-22 NOTE — PATIENT INSTRUCTIONS
"Hypertension, Adult  High blood pressure (hypertension) is when the force of blood pumping through the arteries is too strong. The arteries are the blood vessels that carry blood from the heart throughout the body. Hypertension forces the heart to work harder to pump blood and may cause arteries to become narrow or stiff. Untreated or uncontrolled hypertension can cause a heart attack, heart failure, a stroke, kidney disease, and other problems.  A blood pressure reading consists of a higher number over a lower number. Ideally, your blood pressure should be below 120/80. The first (\"top\") number is called the systolic pressure. It is a measure of the pressure in your arteries as your heart beats. The second (\"bottom\") number is called the diastolic pressure. It is a measure of the pressure in your arteries as the heart relaxes.  What are the causes?  The exact cause of this condition is not known. There are some conditions that result in or are related to high blood pressure.  What increases the risk?  Some risk factors for high blood pressure are under your control. The following factors may make you more likely to develop this condition:  Smoking.  Having type 2 diabetes mellitus, high cholesterol, or both.  Not getting enough exercise or physical activity.  Being overweight.  Having too much fat, sugar, calories, or salt (sodium) in your diet.  Drinking too much alcohol.  Some risk factors for high blood pressure may be difficult or impossible to change. Some of these factors include:  Having chronic kidney disease.  Having a family history of high blood pressure.  Age. Risk increases with age.  Race. You may be at higher risk if you are .  Gender. Men are at higher risk than women before age 45. After age 65, women are at higher risk than men.  Having obstructive sleep apnea.  Stress.  What are the signs or symptoms?  High blood pressure may not cause symptoms. Very high blood pressure " (hypertensive crisis) may cause:  Headache.  Anxiety.  Shortness of breath.  Nosebleed.  Nausea and vomiting.  Vision changes.  Severe chest pain.  Seizures.  How is this diagnosed?  This condition is diagnosed by measuring your blood pressure while you are seated, with your arm resting on a flat surface, your legs uncrossed, and your feet flat on the floor. The cuff of the blood pressure monitor will be placed directly against the skin of your upper arm at the level of your heart. It should be measured at least twice using the same arm. Certain conditions can cause a difference in blood pressure between your right and left arms.  Certain factors can cause blood pressure readings to be lower or higher than normal for a short period of time:  When your blood pressure is higher when you are in a health care provider's office than when you are at home, this is called white coat hypertension. Most people with this condition do not need medicines.  When your blood pressure is higher at home than when you are in a health care provider's office, this is called masked hypertension. Most people with this condition may need medicines to control blood pressure.  If you have a high blood pressure reading during one visit or you have normal blood pressure with other risk factors, you may be asked to:  Return on a different day to have your blood pressure checked again.  Monitor your blood pressure at home for 1 week or longer.  If you are diagnosed with hypertension, you may have other blood or imaging tests to help your health care provider understand your overall risk for other conditions.  How is this treated?  This condition is treated by making healthy lifestyle changes, such as eating healthy foods, exercising more, and reducing your alcohol intake. Your health care provider may prescribe medicine if lifestyle changes are not enough to get your blood pressure under control, and if:  Your systolic blood pressure is above  130.  Your diastolic blood pressure is above 80.  Your personal target blood pressure may vary depending on your medical conditions, your age, and other factors.  Follow these instructions at home:  Eating and drinking    Eat a diet that is high in fiber and potassium, and low in sodium, added sugar, and fat. An example eating plan is called the DASH (Dietary Approaches to Stop Hypertension) diet. To eat this way:  Eat plenty of fresh fruits and vegetables. Try to fill one half of your plate at each meal with fruits and vegetables.  Eat whole grains, such as whole-wheat pasta, brown rice, or whole-grain bread. Fill about one fourth of your plate with whole grains.  Eat or drink low-fat dairy products, such as skim milk or low-fat yogurt.  Avoid fatty cuts of meat, processed or cured meats, and poultry with skin. Fill about one fourth of your plate with lean proteins, such as fish, chicken without skin, beans, eggs, or tofu.  Avoid pre-made and processed foods. These tend to be higher in sodium, added sugar, and fat.  Reduce your daily sodium intake. Most people with hypertension should eat less than 1,500 mg of sodium a day.  Do not drink alcohol if:  Your health care provider tells you not to drink.  You are pregnant, may be pregnant, or are planning to become pregnant.  If you drink alcohol:  Limit how much you use to:  0-1 drink a day for women.  0-2 drinks a day for men.  Be aware of how much alcohol is in your drink. In the [place] one drink equals one 12 oz bottle of beer (355 mL), one 5 oz glass of wine (148 mL), or one 1½ oz glass of hard liquor (44 mL).  Lifestyle    Work with your health care provider to maintain a healthy body weight or to lose weight. Ask what an ideal weight is for you.  Get at least 30 minutes of exercise most days of the week. Activities may include walking, swimming, or biking.  Include exercise to strengthen your muscles (resistance exercise), such as Pilates or lifting weights, as  part of your weekly exercise routine. Try to do these types of exercises for 30 minutes at least 3 days a week.  Do not use any products that contain nicotine or tobacco, such as cigarettes, e-cigarettes, and chewing tobacco. If you need help quitting, ask your health care provider.  Monitor your blood pressure at home as told by your health care provider.  Keep all follow-up visits as told by your health care provider. This is important.  Medicines  Take over-the-counter and prescription medicines only as told by your health care provider. Follow directions carefully. Blood pressure medicines must be taken as prescribed.  Do not skip doses of blood pressure medicine. Doing this puts you at risk for problems and can make the medicine less effective.  Ask your health care provider about side effects or reactions to medicines that you should watch for.  Contact a health care provider if you:  Think you are having a reaction to a medicine you are taking.  Have headaches that keep coming back (recurring).  Feel dizzy.  Have swelling in your ankles.  Have trouble with your vision.  Get help right away if you:  Develop a severe headache or confusion.  Have unusual weakness or numbness.  Feel faint.  Have severe pain in your chest or abdomen.  Vomit repeatedly.  Have trouble breathing.  Summary  Hypertension is when the force of blood pumping through your arteries is too strong. If this condition is not controlled, it may put you at risk for serious complications.  Your personal target blood pressure may vary depending on your medical conditions, your age, and other factors. For most people, a normal blood pressure is less than 120/80.  Hypertension is treated with lifestyle changes, medicines, or a combination of both. Lifestyle changes include losing weight, eating a healthy, low-sodium diet, exercising more, and limiting alcohol.  This information is not intended to replace advice given to you by your health care  provider. Make sure you discuss any questions you have with your health care provider.  Document Revised: 08/28/2019 Document Reviewed: 08/28/2019  [employer] Patient Education © 2022 [employer] Generalized Anxiety Disorder, Adult  Generalized anxiety disorder (SHARDA) is a mental health condition. Unlike normal worries, anxiety related to SHARDA is not triggered by a specific event. These worries do not fade or get better with time. SHARDA interferes with relationships, work, and school.  SHARDA symptoms can vary from mild to severe. People with severe SHARDA can have intense waves of anxiety with physical symptoms that are similar to panic attacks.  What are the causes?  The exact cause of SHARDA is not known, but the following are believed to have an impact:  Differences in natural brain chemicals.  Genes passed down from parents to children.  Differences in the way threats are perceived.  Development and stress during childhood.  Personality.  What increases the risk?  The following factors may make you more likely to develop this condition:  Being female.  Having a family history of anxiety disorders.  Being very shy.  Experiencing very stressful life events, such as the death of a loved one.  Having a very stressful family environment.  What are the signs or symptoms?  People with SHARDA often worry excessively about many things in their lives, such as their health and family. Symptoms may also include:  Mental and emotional symptoms:  Worrying excessively about natural disasters.  Fear of being late.  Difficulty concentrating.  Fears that others are judging your performance.  Physical symptoms:  Fatigue.  Headaches, muscle tension, muscle twitches, trembling, or feeling shaky.  Feeling like your heart is pounding or beating very fast.  Feeling out of breath or like you cannot take a deep breath.  Having trouble falling asleep or staying asleep, or experiencing restlessness.  Sweating.  Nausea, diarrhea, or irritable bowel  syndrome (IBS).  Behavioral symptoms:  Experiencing erratic moods or irritability.  Avoidance of new situations.  Avoidance of people.  Extreme difficulty making decisions.  How is this diagnosed?  This condition is diagnosed based on your symptoms and medical history. You will also have a physical exam. Your health care provider may perform tests to rule out other possible causes of your symptoms.  To be diagnosed with SHARDA, a person must have anxiety that:  Is out of his or her control.  Affects several different aspects of his or her life, such as work and relationships.  Causes distress that makes him or her unable to take part in normal activities.  Includes at least three symptoms of SHARDA, such as restlessness, fatigue, trouble concentrating, irritability, muscle tension, or sleep problems.  Before your health care provider can confirm a diagnosis of SHARDA, these symptoms must be present more days than they are not, and they must last for 6 months or longer.  How is this treated?  This condition may be treated with:  Medicine. Antidepressant medicine is usually prescribed for long-term daily control. Anti-anxiety medicines may be added in severe cases, especially when panic attacks occur.  Talk therapy (psychotherapy). Certain types of talk therapy can be helpful in treating SHARDA by providing support, education, and guidance. Options include:  Cognitive behavioral therapy (CBT). People learn coping skills and self-calming techniques to ease their physical symptoms. They learn to identify unrealistic thoughts and behaviors and to replace them with more appropriate thoughts and behaviors.  Acceptance and commitment therapy (ACT). This treatment teaches people how to be mindful as a way to cope with unwanted thoughts and feelings.  Biofeedback. This process trains you to manage your body's response (physiological response) through breathing techniques and relaxation methods. You will work with a therapist while  machines are used to monitor your physical symptoms.  Stress management techniques. These include yoga, meditation, and exercise.  A mental health specialist can help determine which treatment is best for you. Some people see improvement with one type of therapy. However, other people require a combination of therapies.  Follow these instructions at home:  Lifestyle  Maintain a consistent routine and schedule.  Anticipate stressful situations. Create a plan and allow extra time to work with your plan.  Practice stress management or self-calming techniques that you have learned from your therapist or your health care provider.  Exercise regularly and spend time outdoors.  Eat a healthy diet that includes plenty of vegetables, fruits, whole grains, low-fat dairy products, and lean protein.  Do not eat a lot of foods that are high in fat, added sugar, or salt (sodium).  Drink plenty of water.  Avoid alcohol. Alcohol can increase anxiety.  Avoid caffeine and certain over-the-counter cold medicines. These may make you feel worse. Ask your pharmacist which medicines to avoid.  General instructions  Take over-the-counter and prescription medicines only as told by your health care provider.  Understand that you are likely to have setbacks. Accept this and be kind to yourself as you persist to take better care of yourself.  Anticipate stressful situations. Create a plan and allow extra time to work with your plan.  Recognize and accept your accomplishments, even if you  them as small.  Spend time with people who care about you.  Keep all follow-up visits. This is important.  Where to find more information  National [place] of Mental Health: www.nimh.nih.gov  Substance Abuse and Mental Health Services: www.samhsa.gov  Contact a health care provider if:  Your symptoms do not get better.  Your symptoms get worse.  You have signs of depression, such as:  A persistently sad or irritable mood.  Loss of enjoyment in  activities that used to bring you alex.  Change in weight or eating.  Changes in sleeping habits.  Get help right away if:  You have thoughts about hurting yourself or others.  If you ever feel like you may hurt yourself or others, or have thoughts about taking your own life, get help right away. Go to your nearest emergency department or:  Call your local emergency services (691 in the [place]  Call a suicide crisis helpline, such as the National Suicide Prevention Lifeline at 1-535.435.4589 or 592 in the [place] This is open 24 hours a day in the [place]  Text the Crisis Text Line at 362004 (in the [place]  Summary  Generalized anxiety disorder (SHARDA) is a mental health condition that involves worry that is not triggered by a specific event.  People with SHARDA often worry excessively about many things in their lives, such as their health and family.  SHARDA may cause symptoms such as restlessness, trouble concentrating, sleep problems, frequent sweating, nausea, diarrhea, headaches, and trembling or muscle twitching.  A mental health specialist can help determine which treatment is best for you. Some people see improvement with one type of therapy. However, other people require a combination of therapies.  This information is not intended to replace advice given to you by your health care provider. Make sure you discuss any questions you have with your health care provider.  Document Revised: 07/13/2022 Document Reviewed: 04/10/2022  [employer] Patient Education © 2022 [employer]

## 2024-04-17 DIAGNOSIS — I10 ESSENTIAL HYPERTENSION: ICD-10-CM

## 2024-04-17 RX ORDER — AMLODIPINE BESYLATE 10 MG/1
10 TABLET ORAL DAILY
Qty: 90 TABLET | Refills: 3 | Status: SHIPPED | OUTPATIENT
Start: 2024-04-17

## 2024-04-17 NOTE — TELEPHONE ENCOUNTER
Rx Refill Note  Requested Prescriptions     Pending Prescriptions Disp Refills    amLODIPine (NORVASC) 10 MG tablet [Pharmacy Med Name: AMLODIPINE BESYLATE 10 MG TAB] 90 tablet 3     Sig: TAKE ONE TABLET BY MOUTH DAILY      Last office visit with prescribing clinician: 2/21/2024   Last telemedicine visit with prescribing clinician: Visit date not found   Next office visit with prescribing clinician: 8/21/2024                         Would you like a call back once the refill request has been completed: [] Yes [] No    If the office needs to give you a call back, can they leave a voicemail: [] Yes [] No    Shawn Roger  04/17/24, 08:15 EDT

## 2024-06-03 ENCOUNTER — OFFICE VISIT (OUTPATIENT)
Dept: FAMILY MEDICINE CLINIC | Facility: CLINIC | Age: 69
End: 2024-06-03
Payer: MEDICARE

## 2024-06-03 VITALS
TEMPERATURE: 98 F | WEIGHT: 188.6 LBS | HEIGHT: 68 IN | RESPIRATION RATE: 20 BRPM | DIASTOLIC BLOOD PRESSURE: 69 MMHG | OXYGEN SATURATION: 94 % | BODY MASS INDEX: 28.58 KG/M2 | SYSTOLIC BLOOD PRESSURE: 115 MMHG | HEART RATE: 65 BPM

## 2024-06-03 DIAGNOSIS — I10 ESSENTIAL HYPERTENSION: ICD-10-CM

## 2024-06-03 DIAGNOSIS — F41.1 GENERALIZED ANXIETY DISORDER: ICD-10-CM

## 2024-06-03 DIAGNOSIS — F41.9 ANXIETY: ICD-10-CM

## 2024-06-03 DIAGNOSIS — E78.2 MIXED HYPERLIPIDEMIA: Primary | ICD-10-CM

## 2024-06-03 PROBLEM — M89.8X7 EXOSTOSIS OF LEFT FOOT: Status: ACTIVE | Noted: 2022-11-01

## 2024-06-03 PROBLEM — E66.811 CLASS 1 OBESITY DUE TO EXCESS CALORIES IN ADULT: Status: ACTIVE | Noted: 2024-06-03

## 2024-06-03 PROBLEM — E66.09 CLASS 1 OBESITY DUE TO EXCESS CALORIES IN ADULT: Status: ACTIVE | Noted: 2024-06-03

## 2024-06-03 PROBLEM — M19.079 OSTEOARTHRITIS OF ANKLE OR FOOT: Status: ACTIVE | Noted: 2022-11-01

## 2024-06-03 PROCEDURE — 3078F DIAST BP <80 MM HG: CPT | Performed by: PSYCHOLOGIST

## 2024-06-03 PROCEDURE — G2211 COMPLEX E/M VISIT ADD ON: HCPCS | Performed by: PSYCHOLOGIST

## 2024-06-03 PROCEDURE — 1126F AMNT PAIN NOTED NONE PRSNT: CPT | Performed by: PSYCHOLOGIST

## 2024-06-03 PROCEDURE — 1160F RVW MEDS BY RX/DR IN RCRD: CPT | Performed by: PSYCHOLOGIST

## 2024-06-03 PROCEDURE — 3074F SYST BP LT 130 MM HG: CPT | Performed by: PSYCHOLOGIST

## 2024-06-03 PROCEDURE — 1159F MED LIST DOCD IN RCRD: CPT | Performed by: PSYCHOLOGIST

## 2024-06-03 PROCEDURE — 99214 OFFICE O/P EST MOD 30 MIN: CPT | Performed by: PSYCHOLOGIST

## 2024-06-03 RX ORDER — BENAZEPRIL/HYDROCHLOROTHIAZIDE 20 MG-25MG
1 TABLET ORAL
Qty: 90 TABLET | Refills: 1 | Status: SHIPPED | OUTPATIENT
Start: 2024-06-03

## 2024-06-03 RX ORDER — ESCITALOPRAM OXALATE 20 MG/1
20 TABLET ORAL DAILY
Qty: 90 TABLET | Refills: 1 | Status: SHIPPED | OUTPATIENT
Start: 2024-06-03

## 2024-06-03 RX ORDER — ATORVASTATIN CALCIUM 20 MG/1
20 TABLET, FILM COATED ORAL NIGHTLY
Qty: 90 TABLET | Refills: 1 | Status: SHIPPED | OUTPATIENT
Start: 2024-06-03

## 2024-06-03 RX ORDER — AMLODIPINE BESYLATE 10 MG/1
10 TABLET ORAL
Qty: 90 TABLET | Refills: 1 | Status: SHIPPED | OUTPATIENT
Start: 2024-06-03 | End: 2024-11-30

## 2024-06-03 NOTE — PROGRESS NOTES
"Chief Complaint  establishing care  (Coming from [facility]. Needs medication refill. )    Subjective        Stefani Severino presents to [facility]  C/O TRANSITION CARE AS HER PCP -- WILL BE SEEING PCP IN [place] 2. CHRONIC ILLNESS  MED / REFILL  Anxiety  Presents for follow-up visit.  Patient reports no chest pain, decreased concentration, excessive worry, irritability, nervous/anxious behavior, palpitations, shortness of breath or suicidal ideas. Symptoms occur occasionally. The severity of symptoms is mild. The patient sleeps 8 hours per night. The quality of sleep is good. Awakens seldom during the night. Past treatments include SSRIs. The treatment provided moderate relief. Compliance with medications is %.   Hyperlipidemia  She has no history of chronic renal disease. Pertinent negatives include no chest pain or shortness of breath.   Hypertension  This is a chronic problem. The current episode started more than 1 year ago. The problem has been stable since onset. The problem is controlled. Associated symptoms include anxiety. Pertinent negatives include no chest pain, headaches, palpitations or shortness of breath. Risk factors for coronary artery disease include post-menopausal state and dyslipidemia. Past treatments include lifestyle changes, angiotensin blockers and diuretics. Current antihypertension treatment includes lifestyle changes, angiotensin blockers and diuretics. There are no compliance problems.  There is no history of angina, kidney disease or CAD/MI. There is no history of chronic renal disease or a thyroid problem.       Objective   Vital Signs:  /69 (BP Location: Right arm, Patient Position: Sitting, Cuff Size: Adult)   Pulse 65   Temp 98 °F (36.7 °C) (Temporal)   Resp 20   Ht 172.7 cm (68\")   Wt 85.5 kg (188 lb 9.6 oz)   SpO2 94%   BMI 28.68 kg/m²   Estimated body mass index is 28.68 kg/m² as calculated from the following:    " "Height as of this encounter: 172.7 cm (68\").    Weight as of this encounter: 85.5 kg (188 lb 9.6 oz).            Physical Exam  Vitals and nursing note reviewed.   Constitutional:       Appearance: Normal appearance. She is obese.   HENT:      Head: Normocephalic.      Right Ear: Tympanic membrane normal.      Left Ear: Tympanic membrane normal.      Nose: Nose normal.      Mouth/Throat:      Mouth: Mucous membranes are moist.   Eyes:      Extraocular Movements: Extraocular movements intact.      Pupils: Pupils are equal, round, and reactive to light.   Cardiovascular:      Rate and Rhythm: Normal rate and regular rhythm.      Pulses: Normal pulses.      Heart sounds: Normal heart sounds.   Pulmonary:      Effort: Pulmonary effort is normal.      Breath sounds: Normal breath sounds.   Abdominal:      General: Abdomen is protuberant. Bowel sounds are normal.      Palpations: Abdomen is soft.   Musculoskeletal:         General: Normal range of motion.      Cervical back: Normal range of motion and neck supple.   Skin:     General: Skin is warm.      Capillary Refill: Capillary refill takes less than 2 seconds.   Neurological:      General: No focal deficit present.      Mental Status: She is alert and oriented to person, place, and time.   Psychiatric:         Mood and Affect: Mood normal.        Result Review :  The following data was reviewed by: Alverto Seth MD on 06/03/2024:  Common labs          9/7/2023    09:15 2/21/2024    08:53   Common Labs   Glucose 86  81    BUN 14  14    Creatinine 0.96  1.01    Sodium 136  141    Potassium 3.6  4.0    Chloride 99  99    Calcium 9.4  9.3    Albumin 4.6  4.5    Total Bilirubin 0.6  0.6    Alkaline Phosphatase 50  45    AST (SGOT) 27  19    ALT (SGPT) 25  15    WBC 4.80  6.43    Hemoglobin 12.2  12.6    Hematocrit 36.9  37.6    Platelets 327  321    Total Cholesterol 161  156    Triglycerides 146  96    HDL Cholesterol 67  69    LDL Cholesterol  69  70  "   Hemoglobin A1C 5.50  5.10      Data reviewed : Radiologic studies 4/12/24 MAMMOGRAM           Assessment and Plan   Diagnoses and all orders for this visit:    1. Mixed hyperlipidemia (Primary)  Assessment & Plan:   Lipid abnormalities are improving with treatment    Plan:  Continue same medication/s without change.      Discussed medication dosage, use, side effects, and goals of treatment in detail.    Counseled patient on lifestyle modifications to help control hyperlipidemia.     Patient Treatment Goals:   LDL goal is less than 70    Followup at the next regular appointment.    Orders:  -     atorvastatin (LIPITOR) 20 MG tablet; Take 1 tablet by mouth Every Night.  Dispense: 90 tablet; Refill: 1    2. Essential hypertension  Assessment & Plan:  Hypertension is stable and controlled  Continue current treatment regimen.  Dietary sodium restriction.  Weight loss.  Regular aerobic exercise.  Blood pressure will be reassessed in 3 months.    Orders:  -     benazepril-hydrochlorthiazide (LOTENSIN HCT) 20-25 MG per tablet; Take 1 tablet by mouth Daily With Breakfast.  Dispense: 90 tablet; Refill: 1  -     amLODIPine (NORVASC) 10 MG tablet; Take 1 tablet by mouth Daily With Dinner for 180 days.  Dispense: 90 tablet; Refill: 1    3. Generalized anxiety disorder  Assessment & Plan:  Psychological condition is improving with treatment.  Continue current treatment regimen.  Psychological condition  will be reassessed at the next regular appointment.      4. Anxiety  -     escitalopram (LEXAPRO) 20 MG tablet; Take 1 tablet by mouth Daily.  Dispense: 90 tablet; Refill: 1    5. Essential hypertension  Comments:  Continue current meds, follow-up with Dr. Nino  Assessment & Plan:  Hypertension is stable and controlled  Continue current treatment regimen.  Dietary sodium restriction.  Weight loss.  Regular aerobic exercise.  Blood pressure will be reassessed in 3 months.    Orders:  -     benazepril-hydrochlorthiazide  (LOTENSIN HCT) 20-25 MG per tablet; Take 1 tablet by mouth Daily With Breakfast.  Dispense: 90 tablet; Refill: 1  -     amLODIPine (NORVASC) 10 MG tablet; Take 1 tablet by mouth Daily With Dinner for 180 days.  Dispense: 90 tablet; Refill: 1    Other orders  -     apixaban (Eliquis) 5 MG tablet tablet; Take 1 tablet by mouth 2 (Two) Times a Day.  Dispense: 180 tablet; Refill: 2             Follow Up   Return in about 3 months (around 9/3/2024) for Recheck, RTC FASTING LABS.  Patient was given instructions and counseling regarding her condition or for health maintenance advice. Please see specific information pulled into the AVS if appropriate.           This document has been electronically signed by Alverto Seth MD  Sherley 3, 2024 11:44 EDT

## 2024-11-15 RX ORDER — DRONEDARONE 400 MG/1
400 TABLET, FILM COATED ORAL 2 TIMES DAILY WITH MEALS
Qty: 90 TABLET | Refills: 1 | Status: SHIPPED | OUTPATIENT
Start: 2024-11-15

## 2024-11-15 RX ORDER — DRONEDARONE 400 MG/1
400 TABLET, FILM COATED ORAL 2 TIMES DAILY WITH MEALS
COMMUNITY
Start: 2024-10-22 | End: 2024-11-15 | Stop reason: SDUPTHER

## 2025-03-17 ENCOUNTER — OFFICE VISIT (OUTPATIENT)
Dept: GASTROENTEROLOGY | Facility: CLINIC | Age: 70
End: 2025-03-17
Payer: MEDICARE

## 2025-03-17 VITALS — DIASTOLIC BLOOD PRESSURE: 64 MMHG | WEIGHT: 192.8 LBS | BODY MASS INDEX: 29.32 KG/M2 | SYSTOLIC BLOOD PRESSURE: 126 MMHG

## 2025-03-17 DIAGNOSIS — R19.7 DIARRHEA, UNSPECIFIED TYPE: Primary | ICD-10-CM

## 2025-03-17 RX ORDER — AZELASTINE 1 MG/ML
SPRAY, METERED NASAL
COMMUNITY

## 2025-03-17 RX ORDER — GUAIFENESIN 600 MG/1
TABLET, EXTENDED RELEASE ORAL
COMMUNITY

## 2025-03-17 RX ORDER — CETIRIZINE HYDROCHLORIDE 10 MG/1
TABLET ORAL
COMMUNITY

## 2025-03-17 RX ORDER — AMLODIPINE AND BENAZEPRIL HYDROCHLORIDE 10; 20 MG/1; MG/1
CAPSULE ORAL
COMMUNITY

## 2025-03-17 NOTE — PROGRESS NOTES
Office Note     Name: Stefani Severino    : 1955     MRN: [id number]     Chief Complaint  GI Issues    Subjective     History of Present Illness:  History of Present Illness  The patient is a 70-year-old female who presents today for follow-up of diarrhea.    She reports intermittent episodes of diarrhea, with no identifiable triggers or associated pain. The frequency of these episodes has increased over the past 2 years, with a notable increase in frequency in the last 3 to 4 months. She describes the sensation as a sudden urge to defecate, with no significant abdominal pain or cramping. The stool is typically watery when this happens. If she has a day of diarrhea, it will typically occur a couple of times that day and resolve. She may go months between these episodes. Prior colonoscopy showed nonspecific colitis, but she does not recall undergoing stool testing for fecal calprotectin.     She reports no correlation between her symptoms and alcohol consumption, no history of pancreatic issues, and no associated bleeding. She also reports no history of significant constipation, although she does experience intermittent pellet-like stools. Stool is typically consistent with 1-3 on [place] stool chart, but she does feel like she has complete bowel movements. She has required intermittent use of laxatives in the past. She used to take a fiber supplement prior to onset of her diarrhea and notes that her bowel habits were a little better when she was taking this. She discontinued due to disliking the chalky texture.     She does not experience postprandial symptoms, nausea, vomiting, heartburn, or reflux. She has recently noticed abdominal bloating but has not identified any specific food triggers.     Supplemental Information  She experienced several months of low blood pressure last year, accompanied by weight loss. She has been on antihypertensive medication for several years. She experienced a  syncopal episode [place], during which her blood pressure was found to be extremely low. She declined a visit to the emergency room but later consulted her primary care physician due to recurrent dizzy spells. Her antihypertensive medication was subsequently reduced. She has had her gallbladder removed. She takes Tylenol and avoids Advil.    SOCIAL HISTORY  She does not drink alcohol.    MEDICATIONS  Current: Tylenol, FiberCon.    Past Medical History:   Past Medical History:   Diagnosis Date    A-fib     Allergic 1994    Anxiety     Cataract 2022    Colon polyp 2023    Diastolic dysfunction     grade 1 with impaired relaxation    H/O total hysterectomy with bilateral salpingo-oophorectomy (BSO)     Headache     Hearing loss     History of appendectomy     History of medical problems Sleep apnea,  aFib    History of tonsillectomy     History of tympanoplasty     Hypercholesterolemia     Hypertension     Left atrial dilatation     LVH (left ventricular hypertrophy)     Obesity     Sleep apnea     Tinnitus        Past Surgical History:   Past Surgical History:   Procedure Laterality Date    APPENDECTOMY  1969    COLONOSCOPY      EYE SURGERY  2023    Cataracts    HYSTERECTOMY  1984    INNER EAR SURGERY Left 1997, 2005    OOPHORECTOMY      REDUCTION MAMMAPLASTY      TONSILLECTOMY  1959       Immunizations:   Immunization History   Administered Date(s) Administered    31-influenza Vac Quardvalent Preservativ 10/01/2019, 10/14/2020    COVID-19 (PFIZER) Purple Cap Monovalent 01/30/2021, 02/21/2021, 09/27/2021    Fluad Quad 65+ 10/12/2020    Fluzone High-Dose 65+yrs 11/10/2021, 09/27/2022, 11/21/2023, 04/24/2024    Hep A, Unspecified 11/04/2019    Hepatitis A 02/22/2019, 08/14/2019    Influenza, Unspecified 10/29/2018, 09/16/2019    Pneumococcal Polysaccharide (PPSV23) 11/02/2018    Pneumococcal, Unspecified 10/01/2018    Shingrix 11/28/2018, 06/03/2019    Tdap 10/21/2019        Medications:     Current  Outpatient Medications:     amLODIPine-benazepril (Lotrel) 10-20 MG per capsule, Take  by mouth., Disp: , Rfl:     apixaban (Eliquis) 5 MG tablet tablet, Take 1 tablet by mouth 2 (Two) Times a Day., Disp: 180 tablet, Rfl: 2    atorvastatin (LIPITOR) 20 MG tablet, Take 1 tablet by mouth Every Night., Disp: 90 tablet, Rfl: 1    azelastine (ASTELIN) 0.1 % nasal spray, Administer  into the nostril(s) as directed by provider., Disp: , Rfl:     benazepril-hydrochlorthiazide (LOTENSIN HCT) 20-25 MG per tablet, Take 1 tablet by mouth Daily With Breakfast., Disp: 90 tablet, Rfl: 1    cetirizine (ZyrTEC Allergy) 10 MG tablet, Take  by mouth., Disp: , Rfl:     EPINEPHrine (EPIPEN) 0.3 MG/0.3ML solution auto-injector injection, , Disp: , Rfl:     escitalopram (LEXAPRO) 20 MG tablet, Take 1 tablet by mouth Daily., Disp: 90 tablet, Rfl: 1    guaiFENesin (Mucinex) 600 MG 12 hr tablet, Take  by mouth., Disp: , Rfl:     mometasone (NASONEX) 50 MCG/ACT nasal spray, As Needed., Disp: , Rfl:     Multaq 400 MG tablet, Take 1 tablet by mouth 2 (Two) Times a Day With Meals., Disp: 90 tablet, Rfl: 1    penciclovir (Denavir) 1 % cream, Apply 1 Application topically to the appropriate area as directed Every 2 (Two) Hours., Disp: 5 g, Rfl: 0    valACYclovir (Valtrex) 500 MG tablet, Take 1 tablet by mouth 2 (Two) Times a Day., Disp: 60 tablet, Rfl: 0    Allergies:   Allergies   Allergen Reactions    Dextroamphetamine Sulfate Hallucinations    Penicillins Unknown (See Comments)     ALLERGY AS A CHILD AND DOESN'T RECALL REACTION       Family History:   Family History   Problem Relation Age of Onset    Dementia Mother     Hydrocephalus Mother     Ovarian cancer Mother 47    Arthritis Mother     Osteoporosis Mother     Hyperthyroidism Mother     Hypertension Mother     Cancer Mother         Ovarian    Anemia Mother     Hearing loss Mother     Thyroid disease Mother     Vision loss Mother         Glaucoma    Stroke Mother     Heart valve disorder  "Father     Diabetes Father     Heart disease Father         Valve issue    Thyroid cancer Brother     Hypertension Brother     Hearing loss Brother     Hypertension Brother     Cancer Brother         Thyroid    Mental illness Brother     Breast cancer Neg Hx        Social History:   Social History     Socioeconomic History    Marital status:    Tobacco Use    Smoking status: Former     Current packs/day: 0.00     Average packs/day: 1 pack/day for 2.1 years (2.1 ttl pk-yrs)     Types: Cigarettes     Start date: 1972     Quit date: 1974     Years since quittin.1     Passive exposure: Never    Smokeless tobacco: Never   Vaping Use    Vaping status: Never Used   Substance and Sexual Activity    Alcohol use: Yes     Alcohol/week: 1.0 standard drink of alcohol     Types: 1 Drinks containing 0.5 oz of alcohol per week     Comment: Less than 1 per week    Drug use: No    Sexual activity: Yes     Partners: Male     Birth control/protection: Post-menopausal         Objective     Vital Signs  /64 (BP Location: Right arm, Patient Position: Sitting, Cuff Size: Large Adult)   Wt 87.5 kg (192 lb 12.8 oz)   BMI 29.32 kg/m²   Estimated body mass index is 29.32 kg/m² as calculated from the following:    Height as of 6/3/24: 172.7 cm (68\").    Weight as of this encounter: 87.5 kg (192 lb 12.8 oz).          Physical Exam  Vitals reviewed.   Constitutional:       General: She is awake.   Cardiovascular:      Rate and Rhythm: Normal rate.   Pulmonary:      Effort: Pulmonary effort is normal.   Abdominal:      Palpations: Abdomen is soft.      Tenderness: There is no abdominal tenderness.   Skin:     General: Skin is warm and dry.   Neurological:      Mental Status: She is alert.        Physical Exam      Results  Imaging  Colonoscopy in  showed nonspecific colitis.      Assessment and Plan     Assessment & Plan  Diarrhea, unspecified type            Assessment & Plan  1. Diarrhea.  A colonoscopy in  " revealed nonspecific colitis, but no signs of Crohn's or ulcerative colitis. The likelihood of pancreatic insufficiency or bacterial infection is considered low due to the infrequency of her symptoms. Low suspicion for IBD, given infrequent nature and quick resolution. I suspect that she may have some intermittent worsening of constipation, particularly since discontinuing fiber supplementation. She is advised to monitor her symptoms closely and identify any patterns related to constipation or diarrhea. A stool sample will be collected during symptomatic period to check fecal calprotectin.     Instructions for a bowel cleanse have been provided, to be used if she notes diarrhea in the setting of constipation. She is also advised to resume her fiber supplement, recommended FiberCon, which does not cause bloating. Would consider daily use of stool softeners or miralax to prevent constipation.      If inflammation is detected in the stool sample, will consider treatment with 5ASA or budesonide.    2. Low blood pressure.  The patient has a history of low blood pressure, which was previously managed with blood pressure medication. She experienced a significant episode of low blood pressure last year, leading to fainting and dizziness. Her blood pressure medication was subsequently lowered by her regular doctor and cardiologist.    PROCEDURE  The patient underwent a colonoscopy in 2023, which showed nonspecific colitis.      Follow Up  As needed    Patient or patient representative verbalized consent for the use of Ambient Listening during the visit with  BECKA Salcido for chart documentation. 3/17/2025  11:31 EDT    BECKA Salcido  [facility] [address] [place] [address] GASTROENTEROLOGY  [address] [place] [id number]